# Patient Record
Sex: FEMALE | Race: WHITE | NOT HISPANIC OR LATINO | Employment: FULL TIME | URBAN - METROPOLITAN AREA
[De-identification: names, ages, dates, MRNs, and addresses within clinical notes are randomized per-mention and may not be internally consistent; named-entity substitution may affect disease eponyms.]

---

## 2019-08-22 ENCOUNTER — HOSPITAL ENCOUNTER (OUTPATIENT)
Dept: OTHER | Facility: HOSPITAL | Age: 24
Discharge: HOME | End: 2019-08-22
Attending: PHYSICIAN ASSISTANT

## 2020-08-12 ENCOUNTER — HOSPITAL ENCOUNTER (OUTPATIENT)
Dept: OTHER | Facility: HOSPITAL | Age: 25
Discharge: HOME | End: 2020-08-12
Attending: PHYSICIAN ASSISTANT

## 2020-08-12 ENCOUNTER — HOSPITAL ENCOUNTER (OUTPATIENT)
Dept: URGENT CARE | Facility: CLINIC | Age: 25
Discharge: HOME | End: 2020-08-12

## 2020-10-16 ENCOUNTER — HOSPITAL ENCOUNTER (OUTPATIENT)
Dept: URGENT CARE | Facility: CLINIC | Age: 25
Discharge: HOME | End: 2020-10-16
Attending: PHYSICIAN ASSISTANT

## 2020-12-21 ENCOUNTER — HOSPITAL ENCOUNTER (OUTPATIENT)
Dept: URGENT CARE | Facility: CLINIC | Age: 25
Discharge: HOME | End: 2020-12-21

## 2021-03-18 ENCOUNTER — HOSPITAL ENCOUNTER (OUTPATIENT)
Dept: URGENT CARE | Facility: CLINIC | Age: 26
Discharge: HOME | End: 2021-03-18
Attending: FAMILY MEDICINE

## 2021-03-22 ENCOUNTER — CONVERSION ENCOUNTER (OUTPATIENT)
Dept: PODIATRY | Facility: CLINIC | Age: 26
End: 2021-03-22

## 2021-03-22 ENCOUNTER — OFFICE VISIT CONVERTED (OUTPATIENT)
Dept: PODIATRY | Facility: CLINIC | Age: 26
End: 2021-03-22
Attending: PODIATRIST

## 2021-04-05 ENCOUNTER — OFFICE VISIT CONVERTED (OUTPATIENT)
Dept: PODIATRY | Facility: CLINIC | Age: 26
End: 2021-04-05
Attending: PODIATRIST

## 2021-05-03 ENCOUNTER — OFFICE VISIT CONVERTED (OUTPATIENT)
Dept: PODIATRY | Facility: CLINIC | Age: 26
End: 2021-05-03
Attending: PODIATRIST

## 2021-05-10 NOTE — H&P
History and Physical      Patient Name: Jessie Schultz   Patient ID: [id number]   Sex: Female   YOB: 1995    Primary Care Provider: Ga Richter MD   Referring Provider: Ga Richter MD    Visit Date: March 22, 2021    Provider: Alphonse Waite DPM   Location: [facility]   Location Address: [address] [place] [address]  [id number]   Location Phone: [phone]          Chief Complaint  · Left Foot Pain      History Of Present Illness  Jessie Schultz is a 25 year old /White female who presents to the [facility] today new patient referred from Ga Richter MD.      New, Established, New Problem:  new  Location:  Left anterior ankle  Duration:  12 March 2021  Onset:  acute, fell off porch at home  Nature:  sore, achy  Stable, worsening, improving:  stable, modest improvement    Aggravating factors:   Patient relates pain is aggravated by shoe gear and ambulation.     Previous Treatment:  Urgent Care visit, x-ray, CAM walker, crutches.    Patient denies any fevers, chills, nausea, vomiting, shortness of breathe, nor any other constitutional signs nor symptoms.    Patient states that they work at a [employer].         Past Medical History  Ankle fracture, left; Arthritis; Back pain; Bilateral Carpal Tunnel Syndrome; Chronic pain of right ankle; ETD (eustachian tube dysfunction); Numbness in feet; Pain: Wrist; Subcutaneous nodule         Past Surgical History  Adenoidectomy; Carpal tunnel release; Ear Tubes; Tonsilectomy         Medication List  metformin 500 mg oral tablet; Microgestin 1/20 (21) 1-20 mg-mcg oral tablet; Mucinex 600 mg oral tablet extended release 12hr; Nasacort 55 mcg nasal aerosol,spray; Zyrtec 10 mg oral tablet         Allergy List  SULFA (SULFONAMIDES); Sulfamethoprim DS; sulfamethoxazole; sulfamethoxazole-trimethoprim; sulfasalazine; SULFONYLUREAS         Family Medical History  Heart Disease; Cancer, Unspecified; Diabetes,  "unspecified type; Hypertension; FH: colon cancer; FH: stroke         Social History  Alcohol (Current some day); Exercises regularly; Tobacco (Never)         Review of Systems  · Constitutional  o Denies  o : fatigue, night sweats  · Eyes  o Denies  o : double vision, blurred vision  · HENT  o Denies  o : vertigo, recent head injury  · Cardiovascular  o Denies  o : chest pain, irregular heart beats  · Respiratory  o Denies  o : shortness of breath, productive cough  · Gastrointestinal  o Denies  o : nausea, vomiting  · Genitourinary  o Denies  o : dysuria, urinary retention  · Integument  o Denies  o : hair growth change, new skin lesions  · Neurologic  o Denies  o : altered mental status, seizures  · Musculoskeletal  o * See HPI  · Endocrine  o Denies  o : cold intolerance, heat intolerance  · Heme-Lymph  o Denies  o : petechiae, lymph node enlargement or tenderness  · Allergic-Immunologic  o Denies  o : frequent illnesses      Vitals  Date Time BP Position Site L\R Cuff Size HR RR TEMP (F) WT  HT  BMI kg/m2 BSA m2 O2 Sat FR L/min FiO2 HC       03/22/2021 08:05 /100 Sitting    120 - R  96.9 306lbs 0oz 5'  7\" 47.93 2.56 99 %      03/22/2021 08:05 /77 Sitting                       Physical Examination  · Constitutional  o Appearance  o : overweight, well developed  · Cardiovascular  o Peripheral Vascular System  o :   § Pedal Pulses  § : pedal pulses are 2+ and symmetrical  § Extremities  § : no edema of the lower extremities  · Musculoskeletal  o Extremeties/Joint  o : Muscle Testing: Lower extremity muscle strength and range of motion is equal and symmetrical bilaterally.   · Skin and Subcutaneous Tissue  o General Inspection  o : normal texture and turgor, with no excrenscense noted.   o Digits and Nails  o : toenails are noted to be without disease.  · Neurologic  o Sensation  o : epicritic sensations intact bilaterally  · Right Ankle/Foot  o Inspection  o : The ankle alignment and range of motion are " noted to be normal on the Right side.   · Left Ankle/Foot  o Inspection  o : The ankle alignment and range of motion are noted to be normal on the left side. Positive tenderness to palpation to ATF area. Negative anterior drawer sign. No signs of edema, erythema, lymphangitis, nor signs of infection. Local eccymosis     Dr. Waite reviewed radiographs and results from [facility] and discussed them with the patient.  These are significant for avulsion fracture in dorsal talus.  Otherwise, no periosteal reactions nor osteolytic changes seen.  No occult fractures seen.           Assessment  · Foot pain, left     729.5/M79.672  · Ankle sprain     845.00/S93.409A      Plan  · Orders  o Zinc paste impregnated bandage, non-elastic, knitted/woven, width greater than or equal to three inches and less than five inches, per yard () - - 03/22/2021  · Medications  o diclofenac sodium 75 mg oral tablet,delayed release (DR/EC)   SIG: take 1 tablet (75 mg) by oral route 2 times per day for 30 days   DISP: (60) Tablet with 1 refills  Prescribed on 03/22/2021     o Medications have been Reconciled  o Transition of Care or Provider Policy  · Instructions  o Follow Up in 2 weeks.  o Discuss Findings: I have discussed the findings of this evaluation with the patient. The discussion included a complete verbal explanation of any changes in the examination results, diagnosis, and the current treatment plan. A schedule for future care needs was explained. If any questions should arise after returning home, I have encouraged the patient to feel free to contact Dr. Waite. The patient states understanding and agreement with this plan.  o Patient instructed to keep Unna boot cast dry and clean at all times. Patient to follow up immediately if any evidence of cast irritation, or if cast gets wet to change it. Patient to ice, elevate, and rest affected leg to minimize pain andedema, and to maximize healing. Patient to remove  Unna boot in one week. Pt states understanding and agreement with instructions.  o Pt to use CAM walker when ambulating. Dr. Waite advised patient may resume driving, but advised not to drive while wearing anambulatory device. Advised quick/hard depression of brakes could cause injury to areas. Also advised of possible legal implications while driving in restrictive device. The patient states understanding and agreement with these instructions.  o Electronically Identified Patient Education Materials Provided Electronically  · Disposition  o Call or Return if symptoms worsen or persist.  · Referrals  o ID: [id number] Date: 03/19/2021 Type: Inbound  Specialty: Podiatry            Electronically Signed by: Alphonse Waite DPM -Author on April 5, 2021 08:34:11 AM

## 2021-05-14 VITALS
BODY MASS INDEX: 45.99 KG/M2 | OXYGEN SATURATION: 100 % | DIASTOLIC BLOOD PRESSURE: 80 MMHG | TEMPERATURE: 97.3 F | SYSTOLIC BLOOD PRESSURE: 126 MMHG | WEIGHT: 293 LBS | HEIGHT: 67 IN | HEART RATE: 90 BPM

## 2021-05-14 VITALS
WEIGHT: 293 LBS | BODY MASS INDEX: 45.99 KG/M2 | DIASTOLIC BLOOD PRESSURE: 90 MMHG | OXYGEN SATURATION: 98 % | HEART RATE: 110 BPM | TEMPERATURE: 97.1 F | HEIGHT: 67 IN | SYSTOLIC BLOOD PRESSURE: 123 MMHG

## 2021-05-14 VITALS
HEIGHT: 67 IN | DIASTOLIC BLOOD PRESSURE: 100 MMHG | TEMPERATURE: 96.9 F | WEIGHT: 293 LBS | BODY MASS INDEX: 45.99 KG/M2 | SYSTOLIC BLOOD PRESSURE: 153 MMHG | OXYGEN SATURATION: 99 % | HEART RATE: 120 BPM

## 2021-05-14 NOTE — PROGRESS NOTES
Progress Note      Patient Name: Jessie Schultz   Patient ID: [id number]   Sex: Female   YOB: 1995    Primary Care Provider: Ga Richter MD   Referring Provider: Ga Richter MD    Visit Date: April 5, 2021    Provider: Alphonse Waite DPM   Location: [facility]   Location Address: [address] [place] [address]  [id number]   Location Phone: [phone]          Chief Complaint  · Left Foot Pain      History Of Present Illness  Jessie Schultz is a 25 year old /White female who presents to the [facility] today a follow up for:      New, Established, New Problem:  est  Location:  Left anterior ankle  Duration:  12 March 2021  Onset:  acute, fell off porch at home  Nature:  sore, achy  Stable, worsening, improving:  some improvement    Aggravating factors:   Patient relates pain is aggravated by shoe gear and ambulation.     Previous Treatment:  Urgent Care visit, x-ray, CAM walker, crutches.    Patient denies any fevers, chills, nausea, vomiting, shortness of breathe, nor any other constitutional signs nor symptoms.    Patient states that they work at a [place].         Past Medical History  Ankle fracture, left; Arthritis; Back pain; Bilateral Carpal Tunnel Syndrome; Chronic pain of right ankle; ETD (eustachian tube dysfunction); Numbness in feet; Pain: Wrist; Subcutaneous nodule         Past Surgical History  Adenoidectomy; Carpal tunnel release; Ear Tubes; Tonsilectomy         Medication List  diclofenac sodium 75 mg oral tablet,delayed release (DR/EC); metformin 500 mg oral tablet; Microgestin 1/20 (21) 1-20 mg-mcg oral tablet; Mucinex 600 mg oral tablet extended release 12hr; Nasacort 55 mcg nasal aerosol,spray; Zyrtec 10 mg oral tablet         Allergy List  SULFA (SULFONAMIDES); Sulfamethoprim DS; sulfamethoxazole; sulfamethoxazole-trimethoprim; sulfasalazine; SULFONYLUREAS       Allergies Reconciled  Family Medical History  Heart Disease;  "Cancer, Unspecified; Diabetes, unspecified type; Hypertension; FH: colon cancer; FH: stroke         Social History  Alcohol (Current some day); Exercises regularly; Tobacco (Never)         Review of Systems  · Constitutional  o Denies  o : fatigue, night sweats  · Eyes  o Denies  o : double vision, blurred vision  · HENT  o Denies  o : vertigo, recent head injury  · Cardiovascular  o Denies  o : chest pain, irregular heart beats  · Respiratory  o Denies  o : shortness of breath, productive cough  · Gastrointestinal  o Denies  o : nausea, vomiting  · Genitourinary  o Denies  o : dysuria, urinary retention  · Integument  o Denies  o : hair growth change, new skin lesions  · Neurologic  o Denies  o : altered mental status, seizures  · Musculoskeletal  o * See HPI  · Endocrine  o Denies  o : cold intolerance, heat intolerance  · Heme-Lymph  o Denies  o : petechiae, lymph node enlargement or tenderness  · Allergic-Immunologic  o Denies  o : frequent illnesses      Vitals  Date Time BP Position Site L\R Cuff Size HR RR TEMP (F) WT  HT  BMI kg/m2 BSA m2 O2 Sat FR L/min FiO2 HC       04/05/2021 08:17 /90 Sitting    110 - R  97.1 349lbs 0oz 5'  7\" 54.66 2.74 98 %            Physical Examination  · Constitutional  o Appearance  o : overweight, well developed  · Cardiovascular  o Peripheral Vascular System  o :   § Pedal Pulses  § : pedal pulses are 2+ and symmetrical  § Extremities  § : no edema of the lower extremities  · Musculoskeletal  o Extremeties/Joint  o : Muscle Testing: Lower extremity muscle strength and range of motion is equal and symmetrical bilaterally.   · Skin and Subcutaneous Tissue  o General Inspection  o : normal texture and turgor, with no excrenscense noted.   o Digits and Nails  o : toenails are noted to be without disease.  · Neurologic  o Sensation  o : epicritic sensations intact bilaterally  · Right Ankle/Foot  o Inspection  o : The ankle alignment and range of motion are noted to be normal " on the right side.   · Left Ankle/Foot  o Inspection  o : The ankle alignment and range of motion are noted to be normal on the left side. Positive tenderness to palpation to ATF area. Negative anterior drawer sign. No signs of edema, erythema, lymphangitis, nor signs of infection. Local eccymosis     Pt using one crutch and her CAM walker on Left foot.           Assessment  · Grade 1 ankle sprain, left, initial encounter     845.00/S93.402A  · Foot pain, left     729.5/M79.672  · Ankle sprain     845.00/S93.409A      Plan  · Orders  o PHYSICAL THERAPY CONSULTATION (PHYTH) - - 04/05/2021  · Medications  o Medications have been Reconciled  o Transition of Care or Provider Policy  · Instructions  o Discuss Findings: I have discussed the findings of this evaluation with the patient. The discussion included a complete verbal explanation of any changes in the examination results, diagnosis, and the current treatment plan. A schedule for future care needs was explained. If any questions should arise after returning home, I have encouraged the patient to feel free to contact Dr. Waite. The patient states understanding and agreement with this plan.  o Monitor For Problems: Pt to monitor for problems and to contact Dr. Wiate for follow-up should such signs occur. Patient states understanding and agreement with this plan.  o Follow Up in 4 weeks.  o Discuss Findings: I have discussed the findings of this evaluation with the patient. The discussion included a complete verbal explanation of any changes in the examination results, diagnosis, and the current treatment plan. A schedule for future care needs was explained. If any questions should arise after returning home, I have encouraged the patient to feel free to contact Dr. Waite. The patient states understanding and agreement with this plan.  o Pt to use CAM walker when ambulating. Pt to d/c CAM as she improves with Physical Therapy. Dr. Waite advised patient  may resume driving, but advised not to drive while wearing an ambulatory device. Advised quick/hard depression of brakes could cause injury to areas. Also advised of possible legal implications while driving in restrictive device. The patient states understanding and agreement with these instructions.  o Electronically Identified Patient Education Materials Provided Electronically  · Disposition  o Call or Return if symptoms worsen or persist.  · Referrals  o ID: [id number] Date: 03/19/2021 Type: Inbound  Specialty: Podiatry            Electronically Signed by: Alphonse Waite DPM -Author on April 5, 2021 08:33:37 AM

## 2021-05-14 NOTE — PROGRESS NOTES
Progress Note      Patient Name: Jessie Schultz   Patient ID: [id number]   Sex: Female   YOB: 1995    Primary Care Provider: Ga Richter MD   Referring Provider: Ga Richter MD    Visit Date: May 3, 2021    Provider: Alphonse Waite DPM   Location: [facility]   Location Address: [address] [place] [address]  [id number]   Location Phone: [phone]          Chief Complaint  · Left Foot Pain      History Of Present Illness  Jessie Schultz is a 25 year old /White female who presents to the [facility] today a follow up for:      New, Established, New Problem:  est  Location:  Left anterior ankle  Duration:  12 March 2021  Onset:  acute, fell off porch at home  Nature:  sore, achy  Stable, worsening, improving:  improving  Aggravating factors:   Patient relates pain is aggravated by shoe gear and ambulation.     Previous Treatment:  Urgent Care visit, x-ray, CAM walker, crutches, Physical Therapy    Patient denies any fevers, chills, nausea, vomiting, shortness of breathe, nor any other constitutional signs nor symptoms.    Patient states that they work at a [place].    Pt states her ankle is feeling better.       Past Medical History  Ankle fracture, left; Arthritis; Back pain; Bilateral Carpal Tunnel Syndrome; Chronic pain of right ankle; ETD (eustachian tube dysfunction); Numbness in feet; Pain: Wrist; Subcutaneous nodule         Past Surgical History  Adenoidectomy; Carpal tunnel release; Ear Tubes; Tonsilectomy         Medication List  diclofenac sodium 75 mg oral tablet,delayed release (DR/EC); metformin 500 mg oral tablet; Microgestin 1/20 (21) 1-20 mg-mcg oral tablet; Mucinex 600 mg oral tablet extended release 12hr; Nasacort 55 mcg nasal aerosol,spray; Zyrtec 10 mg oral tablet         Allergy List  SULFA (SULFONAMIDES); Sulfamethoprim DS; sulfamethoxazole; sulfamethoxazole-trimethoprim; sulfasalazine; SULFONYLUREAS       Allergies  "Reconciled  Family Medical History  Heart Disease; Cancer, Unspecified; Diabetes, unspecified type; Hypertension; FH: colon cancer; FH: stroke         Social History  Alcohol (Current some day); Exercises regularly; Tobacco (Never)         Review of Systems  · Constitutional  o Denies  o : fatigue, night sweats  · Eyes  o Denies  o : double vision, blurred vision  · HENT  o Denies  o : vertigo, recent head injury  · Cardiovascular  o Denies  o : chest pain, irregular heart beats  · Respiratory  o Denies  o : shortness of breath, productive cough  · Gastrointestinal  o Denies  o : nausea, vomiting  · Genitourinary  o Denies  o : dysuria, urinary retention  · Integument  o Denies  o : hair growth change, new skin lesions  · Neurologic  o Denies  o : altered mental status, seizures  · Musculoskeletal  o * See HPI  · Endocrine  o Denies  o : cold intolerance, heat intolerance  · Heme-Lymph  o Denies  o : petechiae, lymph node enlargement or tenderness  · Allergic-Immunologic  o Denies  o : frequent illnesses      Vitals  Date Time BP Position Site L\R Cuff Size HR RR TEMP (F) WT  HT  BMI kg/m2 BSA m2 O2 Sat FR L/min FiO2 HC       05/03/2021 08:33 /80 Sitting    90 - R  97.3 347lbs 0oz 5'  7\" 54.35 2.73 100 %            Physical Examination  · Constitutional  o Appearance  o : overweight, well developed  · Cardiovascular  o Peripheral Vascular System  o :   § Pedal Pulses  § : pedal pulses are 2+ and symmetrical  § Extremities  § : no edema of the lower extremities  · Musculoskeletal  o Extremeties/Joint  o : Muscle Testing: Lower extremity muscle strength and range of motion is equal and symmetrical bilaterally.   · Skin and Subcutaneous Tissue  o General Inspection  o : normal texture and turgor, with no excrenscense noted.   o Digits and Nails  o : toenails are noted to be without disease.  · Neurologic  o Sensation  o : epicritic sensations intact bilaterally  · Right Ankle/Foot  o Inspection  o : The ankle " alignment and range of motion are noted to be normal on the right side.   · Left Ankle/Foot  o Inspection  o : The ankle alignment and range of motion are noted to be normal on the left side. Minimal tenderness to palpation to ATF area. Negative anterior drawer sign. No signs of edema, erythema, lymphangitis, nor signs of infection. No eccymosis     Pt using her CAM walker on Left foot.           Assessment  · Grade 1 ankle sprain, left, initial encounter     845.00/S93.402A  · Foot pain, left     729.5/M79.672  · Ankle sprain     845.00/S93.409A      Plan  · Medications  o Medications have been Reconciled  o Transition of Care or Provider Policy  · Instructions  o Discuss Findings: I have discussed the findings of this evaluation with the patient. The discussion included a complete verbal explanation of any changes in the examination results, diagnosis, and the current treatment plan. A schedule for future care needs was explained. If any questions should arise after returning home, I have encouraged the patient to feel free to contact Dr. Waite. The patient states understanding and agreement with this plan.  o Monitor For Problems: Pt to monitor for problems and to contact Dr. Waite for follow-up should such signs occur. Patient states understanding and agreement with this plan.  o Follow Up PRN.  o Discuss Findings: I have discussed the findings of this evaluation with the patient. The discussion included a complete verbal explanation of any changes in the examination results, diagnosis, and the current treatment plan. A schedule for future care needs was explained. If any questions should arise after returning home, I have encouraged the patient to feel free to contact Dr. Waite. The patient states understanding and agreement with this plan.  o Pt to transition to regular shoegear as tolerated. Pt to use OTC ankle brace as needed. The patient states understanding and agreement with this plan.    o Electronically Identified Patient Education Materials Provided Electronically  · Disposition  o Call or Return if symptoms worsen or persist.  · Referrals  o ID: [id number] Date: 03/19/2021 Type: Inbound  Specialty: Podiatry            Electronically Signed by: Alphonse Waite DPM -Author on May 3, 2021 08:57:19 AM

## 2021-08-17 ENCOUNTER — APPOINTMENT (OUTPATIENT)
Dept: CT IMAGING | Facility: HOSPITAL | Age: 26
End: 2021-08-17

## 2021-08-17 ENCOUNTER — HOSPITAL ENCOUNTER (EMERGENCY)
Facility: HOSPITAL | Age: 26
Discharge: HOME | End: 2021-08-17
Attending: EMERGENCY MEDICINE | Admitting: EMERGENCY MEDICINE

## 2021-08-17 ENCOUNTER — APPOINTMENT (OUTPATIENT)
Dept: GENERAL RADIOLOGY | Facility: HOSPITAL | Age: 26
End: 2021-08-17

## 2021-08-17 VITALS
RESPIRATION RATE: 16 BRPM | BODY MASS INDEX: 45.99 KG/M2 | SYSTOLIC BLOOD PRESSURE: 123 MMHG | OXYGEN SATURATION: 98 % | TEMPERATURE: 98.9 F | HEART RATE: 87 BPM | DIASTOLIC BLOOD PRESSURE: 87 MMHG | WEIGHT: 293 LBS | HEIGHT: 67 IN

## 2021-08-17 DIAGNOSIS — G51.0 LEFT-SIDED BELL'S PALSY: Primary | ICD-10-CM

## 2021-08-17 LAB
ALBUMIN SERPL-MCNC: 4.4 G/DL (ref 3.5–5.2)
ALBUMIN/GLOB SERPL: 1.4 G/DL
ALP SERPL-CCNC: 89 U/L (ref 39–117)
ALT SERPL W P-5'-P-CCNC: 22 U/L (ref 1–33)
ANION GAP SERPL CALCULATED.3IONS-SCNC: 10.7 MMOL/L (ref 5–15)
APTT PPP: 25.2 SECONDS (ref 22.2–34.2)
AST SERPL-CCNC: 17 U/L (ref 1–32)
BASOPHILS # BLD AUTO: 0.03 10*3/MM3 (ref 0–0.2)
BASOPHILS NFR BLD AUTO: 0.3 % (ref 0–1.5)
BILIRUB SERPL-MCNC: 0.2 MG/DL (ref 0–1.2)
BILIRUB UR QL STRIP: NEGATIVE
BUN SERPL-MCNC: 11 MG/DL (ref 6–20)
BUN/CREAT SERPL: 20.8 (ref 7–25)
CALCIUM SPEC-SCNC: 9.6 MG/DL (ref 8.6–10.5)
CHLORIDE SERPL-SCNC: 105 MMOL/L (ref 98–107)
CLARITY UR: CLEAR
CO2 SERPL-SCNC: 25.3 MMOL/L (ref 22–29)
COLOR UR: YELLOW
CREAT SERPL-MCNC: 0.53 MG/DL (ref 0.57–1)
DEPRECATED RDW RBC AUTO: 38.5 FL (ref 37–54)
EOSINOPHIL # BLD AUTO: 0.24 10*3/MM3 (ref 0–0.4)
EOSINOPHIL NFR BLD AUTO: 2.4 % (ref 0.3–6.2)
ERYTHROCYTE [DISTWIDTH] IN BLOOD BY AUTOMATED COUNT: 12.5 % (ref 12.3–15.4)
GFR SERPL CREATININE-BSD FRML MDRD: 139 ML/MIN/1.73
GLOBULIN UR ELPH-MCNC: 3.1 GM/DL
GLUCOSE SERPL-MCNC: 97 MG/DL (ref 65–99)
GLUCOSE UR STRIP-MCNC: NEGATIVE MG/DL
HCT VFR BLD AUTO: 41.4 % (ref 34–46.6)
HGB BLD-MCNC: 13.8 G/DL (ref 12–15.9)
HGB UR QL STRIP.AUTO: NEGATIVE
HOLD SPECIMEN: NORMAL
HOLD SPECIMEN: NORMAL
IMM GRANULOCYTES # BLD AUTO: 0.07 10*3/MM3 (ref 0–0.05)
IMM GRANULOCYTES NFR BLD AUTO: 0.7 % (ref 0–0.5)
INR PPP: 0.92 (ref 2–3)
KETONES UR QL STRIP: NEGATIVE
LEUKOCYTE ESTERASE UR QL STRIP.AUTO: NEGATIVE
LYMPHOCYTES # BLD AUTO: 2.7 10*3/MM3 (ref 0.7–3.1)
LYMPHOCYTES NFR BLD AUTO: 27.2 % (ref 19.6–45.3)
MCH RBC QN AUTO: 28.6 PG (ref 26.6–33)
MCHC RBC AUTO-ENTMCNC: 33.3 G/DL (ref 31.5–35.7)
MCV RBC AUTO: 85.7 FL (ref 79–97)
MONOCYTES # BLD AUTO: 0.55 10*3/MM3 (ref 0.1–0.9)
MONOCYTES NFR BLD AUTO: 5.5 % (ref 5–12)
NEUTROPHILS NFR BLD AUTO: 6.32 10*3/MM3 (ref 1.7–7)
NEUTROPHILS NFR BLD AUTO: 63.9 % (ref 42.7–76)
NITRITE UR QL STRIP: NEGATIVE
NRBC BLD AUTO-RTO: 0 /100 WBC (ref 0–0.2)
PH UR STRIP.AUTO: 5.5 [PH] (ref 5–8)
PLATELET # BLD AUTO: 307 10*3/MM3 (ref 140–450)
PMV BLD AUTO: 9.3 FL (ref 6–12)
POTASSIUM SERPL-SCNC: 3.9 MMOL/L (ref 3.5–5.2)
PROT SERPL-MCNC: 7.5 G/DL (ref 6–8.5)
PROT UR QL STRIP: NEGATIVE
PROTHROMBIN TIME: 10.2 SECONDS (ref 9.4–12)
QT INTERVAL: 365 MS
RBC # BLD AUTO: 4.83 10*6/MM3 (ref 3.77–5.28)
SODIUM SERPL-SCNC: 141 MMOL/L (ref 136–145)
SP GR UR STRIP: 1.02 (ref 1–1.03)
TROPONIN T SERPL-MCNC: <0.01 NG/ML (ref 0–0.03)
UROBILINOGEN UR QL STRIP: NORMAL
WBC # BLD AUTO: 9.91 10*3/MM3 (ref 3.4–10.8)
WHOLE BLOOD HOLD SPECIMEN: NORMAL

## 2021-08-17 PROCEDURE — 70450 CT HEAD/BRAIN W/O DYE: CPT

## 2021-08-17 PROCEDURE — 93005 ELECTROCARDIOGRAM TRACING: CPT | Performed by: EMERGENCY MEDICINE

## 2021-08-17 PROCEDURE — 85025 COMPLETE CBC W/AUTO DIFF WBC: CPT | Performed by: EMERGENCY MEDICINE

## 2021-08-17 PROCEDURE — 85730 THROMBOPLASTIN TIME PARTIAL: CPT | Performed by: EMERGENCY MEDICINE

## 2021-08-17 PROCEDURE — 93010 ELECTROCARDIOGRAM REPORT: CPT | Performed by: INTERNAL MEDICINE

## 2021-08-17 PROCEDURE — 80053 COMPREHEN METABOLIC PANEL: CPT | Performed by: EMERGENCY MEDICINE

## 2021-08-17 PROCEDURE — 81003 URINALYSIS AUTO W/O SCOPE: CPT | Performed by: EMERGENCY MEDICINE

## 2021-08-17 PROCEDURE — 71045 X-RAY EXAM CHEST 1 VIEW: CPT | Performed by: RADIOLOGY

## 2021-08-17 PROCEDURE — 84484 ASSAY OF TROPONIN QUANT: CPT | Performed by: EMERGENCY MEDICINE

## 2021-08-17 PROCEDURE — 99284 EMERGENCY DEPT VISIT MOD MDM: CPT

## 2021-08-17 PROCEDURE — 71045 X-RAY EXAM CHEST 1 VIEW: CPT

## 2021-08-17 PROCEDURE — 85610 PROTHROMBIN TIME: CPT | Performed by: EMERGENCY MEDICINE

## 2021-08-17 RX ORDER — METHYLPREDNISOLONE 4 MG/1
TABLET ORAL
Qty: 21 TABLET | Refills: 0 | Status: SHIPPED | OUTPATIENT
Start: 2021-08-17 | End: 2021-10-31

## 2021-08-17 RX ORDER — VALACYCLOVIR HYDROCHLORIDE 1 G/1
1000 TABLET, FILM COATED ORAL 3 TIMES DAILY
Qty: 21 TABLET | Refills: 0 | Status: SHIPPED | OUTPATIENT
Start: 2021-08-17 | End: 2021-10-31

## 2021-08-17 RX ORDER — LORATADINE 10 MG/1
10 TABLET ORAL DAILY
COMMUNITY
End: 2022-08-24

## 2021-08-17 RX ORDER — SODIUM CHLORIDE 0.9 % (FLUSH) 0.9 %
10 SYRINGE (ML) INJECTION AS NEEDED
Status: DISCONTINUED | OUTPATIENT
Start: 2021-08-17 | End: 2021-08-17 | Stop reason: HOSPADM

## 2021-08-17 NOTE — ED PROVIDER NOTES
"Subjective   Jessie Schultz is a 26 y.o. female who presents to the emergency department today with complaints of left sided facial numbness since this morning. Pt states \"when I try talking the right side of my face moves but the left stays the same. Its the same when I try to raise my eyebrows.\" Additionally, she claims she has lost all taste since last night. Pt follows with Dr. Rober MD, for her primary care needs. She denies confusion, slurred speech, visual changes, arm or leg numbness, weakness, chills, diaphoresis, fever, or any other pertinent sx or concerns.           Review of Systems   Constitutional: Negative for chills and fever.   HENT: Negative for nosebleeds.    Eyes: Negative for redness.   Respiratory: Negative for cough and shortness of breath.    Cardiovascular: Negative for chest pain.   Gastrointestinal: Negative for diarrhea and vomiting.   Genitourinary: Negative for dysuria and frequency.   Musculoskeletal: Negative for back pain and neck pain.   Skin: Negative for rash.   Neurological: Positive for facial asymmetry and numbness. Negative for seizures and syncope.       History reviewed. No pertinent past medical history.    Allergies   Allergen Reactions   • Sulfa Antibiotics Hives   • Sulfamethoxazole Hives       Past Surgical History:   Procedure Laterality Date   • ADENOIDECTOMY     • CARPAL TUNNEL RELEASE         Family History   Problem Relation Age of Onset   • Diabetes Mother    • Diabetes Father    • Diabetes Maternal Grandmother    • Diabetes Paternal Grandfather        Social History     Socioeconomic History   • Marital status: Single     Spouse name: Not on file   • Number of children: Not on file   • Years of education: Not on file   • Highest education level: Not on file   Tobacco Use   • Smoking status: Never Smoker   • Smokeless tobacco: Never Used   Substance and Sexual Activity   • Alcohol use: Never   • Drug use: Never   • Sexual activity: Defer         Objective "   Physical Exam  Vitals and nursing note reviewed.   Constitutional:       General: She is not in acute distress.  HENT:      Head: Normocephalic and atraumatic.      Nose: Nose normal.      Mouth/Throat:      Mouth: Mucous membranes are moist.   Eyes:      General: No scleral icterus.  Cardiovascular:      Rate and Rhythm: Normal rate and regular rhythm.      Heart sounds: Normal heart sounds. No murmur heard.     Pulmonary:      Effort: No respiratory distress.      Breath sounds: Normal breath sounds.   Abdominal:      Palpations: Abdomen is soft.      Tenderness: There is no abdominal tenderness.   Musculoskeletal:         General: No tenderness. Normal range of motion.      Cervical back: Normal range of motion and neck supple. No tenderness.      Right lower leg: No edema.      Left lower leg: No edema.   Skin:     General: Skin is warm and dry.   Neurological:      General: No focal deficit present.      Mental Status: She is alert.      Sensory: No sensory deficit.      Motor: No weakness.      Comments: Left sided facial paralysis.  Partial blink of left eye   Unable to raise left side of her face    Psychiatric:         Behavior: Behavior normal.         Procedures         ED Course     Labs Reviewed   COMPREHENSIVE METABOLIC PANEL - Abnormal; Notable for the following components:       Result Value    Creatinine 0.53 (*)     All other components within normal limits    Narrative:     GFR Normal >60  Chronic Kidney Disease <60  Kidney Failure <15     PROTIME-INR - Abnormal; Notable for the following components:    INR 0.92 (*)     All other components within normal limits    Narrative:     Suggested Therapeutic Ranges For Oral Anticoagulant Therapy:  Level of Therapy                      INR Target Range  Standard Dose                            2.0-3.0  High Dose                                2.5-3.5  Patients not receiving anticoagulant  Therapy Normal Range                     0.6-1.2   CBC WITH AUTO  DIFFERENTIAL - Abnormal; Notable for the following components:    Immature Grans % 0.7 (*)     Immature Grans, Absolute 0.07 (*)     All other components within normal limits   APTT - Normal   TROPONIN (IN-HOUSE) - Normal    Narrative:     Troponin T Reference Range:  <= 0.03 ng/mL-   Negative for AMI  >0.03 ng/mL-     Abnormal for myocardial necrosis.  Clinicians would have to utilize clinical acumen, EKG, Troponin and serial changes to determine if it is an Acute Myocardial Infarction or myocardial injury due to an underlying chronic condition.       Results may be falsely decreased if patient taking Biotin.     URINALYSIS W/ MICROSCOPIC IF INDICATED (NO CULTURE) - Normal    Narrative:     Urine microscopic not indicated.   RAINBOW DRAW    Narrative:     The following orders were created for panel order [place] Draw.  Procedure                               Abnormality         Status                     ---------                               -----------         ------                     Green Top (Gel)[[id number]]                                  Final result               Lavender Top[[id number]]                                     Final result               Gold Top - SST[[id number]]                                   Final result                 Please view results for these tests on the individual orders.   POCT GLUCOSE FINGERSTICK   POCT CREATININE   CBC AND DIFFERENTIAL    Narrative:     The following orders were created for panel order CBC & Differential.  Procedure                               Abnormality         Status                     ---------                               -----------         ------                     CBC Auto Differential[[id number]]        Abnormal            Final result                 Please view results for these tests on the individual orders.   GREEN TOP   LAVENDER TOP   GOLD TOP - SST   POCT CREATININE WITH HOLD TUBE    Narrative:     The following orders were created for  panel order POC Creatinine with Hold Tube.  Procedure                               Abnormality         Status                     ---------                               -----------         ------                     POC Creatinine[[id number]]                                                              Hold Creatinine Tube[[id number]]                                                          Please view results for these tests on the individual orders.   HOLD ISTAT CREATININE TUBE     XR Chest 1 View    Result Date: 8/17/2021  PROCEDURE: XR CHEST 1 VW  COMPARISON: [facility], , CHEST AP/PA 1 VIEW, 2/12/2016, 12:53.  INDICATIONS: ACUTE STROKE PROTOCOL, FACIAL NUMBNESS  FINDINGS:  Exam is under penetrated.  Low lung volumes.  Normal heart and mediastinal contour.  No hilar finding.  No obvious pleural effusion.  No focal consolidation.  Density projecting over left thorax may reflect external artifact, foreign body, or calcification.  CONCLUSION:  1. No definite acute disease.  There is a 2 centimeter density projecting over aorta is that may reflect external artifact or calcified granuloma.  It was not seen on prior exam from 2016.       IAN SILVESTRE MD       Electronically Signed and Approved By: IAN SILVESTRE MD on 8/17/2021 at 11:29             CT Head Without Contrast Stroke Protocol    Result Date: 8/17/2021  PROCEDURE: CT HEAD WO CONTRAST STROKE PROTOCOL  COMPARISON:  None INDICATIONS: left facial numbness  PROTOCOL:   Standard imaging protocol performed    RADIATION:   DLP: 1018.2mGy*cm   MA and/or KV was adjusted to minimize radiation dose.     TECHNIQUE: After obtaining the patient's consent, CT images were obtained without non-ionic intravenous contrast material.  FINDINGS:  Ventricles and CSF containing spaces are normal in size and configuration no intra or extra-axial mass lesions, fluid collections, or mass effect is seen.  No focal areas of low attenuation or acute intracranial  hemorrhage are seen.  There is mild chronic mucosal thickening in the right maxillary sinus and there is mucosal disease in the ethmoid sinuses bilaterally.  The left frontal sinus is hypoplastic.  CONCLUSION:  1. Normal noncontrast CT brain 2. Chronic mucosal disease in the ethmoid and right maxillary sinuses.     Daniel Sanchez MD       Electronically Signed and Approved By: Daniel Sanchez MD on 8/17/2021 at 10:52                                            Patient was seen evaluated ED by me.  Above history and physical examination was performed as stated above.  The diagnostic data was obtained.  Results reviewed.  And discussed with the patient.  Patient symptoms are consistent with Bell's palsy.  I did discuss symptoms, course, treatment options with her.  This point time patient for discharge home with outpatient follow-up.        MDM    Final diagnoses:   Left-sided Bell's palsy       Documentation assistance provided by yo Bateman.  Information recorded by the yo was done at my direction and has been verified and validated by me.     Deana Bateman  08/17/21 1232       Anton Slater DO  08/17/21 1429

## 2021-08-17 NOTE — DISCHARGE INSTRUCTIONS
Keep the left eye moisturized.  Use eyedrops during the daytime and use moisturizing ointment at bedtime with tape your eye shut.  Follow-up with your family doctor in 1 week if symptoms or not improved discussed possible physical therapy at that time.  Take the prescriptions I prescribed for you today as directed.  Return to the ER for any numbness tingling or weakness of an arm or leg.

## 2021-11-01 ENCOUNTER — TRANSCRIBE ORDERS (OUTPATIENT)
Dept: ADMINISTRATIVE | Facility: HOSPITAL | Age: 26
End: 2021-11-01

## 2021-11-01 ENCOUNTER — HOSPITAL ENCOUNTER (OUTPATIENT)
Dept: INFUSION THERAPY | Facility: HOSPITAL | Age: 26
Discharge: HOME | End: 2021-11-01
Admitting: FAMILY MEDICINE

## 2021-11-01 VITALS
HEART RATE: 104 BPM | TEMPERATURE: 98.9 F | RESPIRATION RATE: 22 BRPM | OXYGEN SATURATION: 99 % | DIASTOLIC BLOOD PRESSURE: 71 MMHG | SYSTOLIC BLOOD PRESSURE: 136 MMHG

## 2021-11-01 DIAGNOSIS — U07.1 COVID-19: Primary | ICD-10-CM

## 2021-11-01 PROCEDURE — 25010000002 INJECTION, CASIRIVIMAB AND IMDEVIMAB, 1200 MG: Performed by: FAMILY MEDICINE

## 2021-11-01 PROCEDURE — M0243 CASIRIVI AND IMDEVI INFUSION: HCPCS | Performed by: FAMILY MEDICINE

## 2021-11-01 RX ORDER — EPINEPHRINE 1 MG/ML
0.3 INJECTION, SOLUTION, CONCENTRATE INTRAVENOUS AS NEEDED
Status: DISCONTINUED | OUTPATIENT
Start: 2021-11-01 | End: 2021-11-03 | Stop reason: HOSPADM

## 2021-11-01 RX ORDER — DIPHENHYDRAMINE HCL 50 MG
50 CAPSULE ORAL ONCE AS NEEDED
Status: DISCONTINUED | OUTPATIENT
Start: 2021-11-01 | End: 2021-11-03 | Stop reason: HOSPADM

## 2021-11-01 RX ORDER — DIPHENHYDRAMINE HYDROCHLORIDE 50 MG/ML
50 INJECTION INTRAMUSCULAR; INTRAVENOUS ONCE AS NEEDED
Status: DISCONTINUED | OUTPATIENT
Start: 2021-11-01 | End: 2021-11-03 | Stop reason: HOSPADM

## 2021-11-01 RX ADMIN — IMDEVIMAB: 1332 INJECTION, SOLUTION, CONCENTRATE INTRAVENOUS at 14:25

## 2021-11-01 NOTE — CODE DOCUMENTATION
Pt didn't tolerate injections very well. She was carefully monitored for any adverse effects from injections. Vs were stable and sites were clear. Education material was given to pt.

## 2021-12-09 ENCOUNTER — APPOINTMENT (OUTPATIENT)
Dept: GENERAL RADIOLOGY | Facility: HOSPITAL | Age: 26
End: 2021-12-09

## 2021-12-09 ENCOUNTER — HOSPITAL ENCOUNTER (EMERGENCY)
Facility: HOSPITAL | Age: 26
Discharge: HOME | End: 2021-12-09
Attending: EMERGENCY MEDICINE | Admitting: EMERGENCY MEDICINE

## 2021-12-09 VITALS
BODY MASS INDEX: 44.41 KG/M2 | OXYGEN SATURATION: 99 % | HEIGHT: 68 IN | RESPIRATION RATE: 18 BRPM | WEIGHT: 293 LBS | HEART RATE: 103 BPM | DIASTOLIC BLOOD PRESSURE: 94 MMHG | SYSTOLIC BLOOD PRESSURE: 143 MMHG | TEMPERATURE: 98.1 F

## 2021-12-09 DIAGNOSIS — S80.211A ABRASION OF RIGHT KNEE, INITIAL ENCOUNTER: ICD-10-CM

## 2021-12-09 DIAGNOSIS — S50.01XA CONTUSION OF RIGHT ELBOW, INITIAL ENCOUNTER: ICD-10-CM

## 2021-12-09 DIAGNOSIS — S50.311A ABRASION OF RIGHT ELBOW, INITIAL ENCOUNTER: ICD-10-CM
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
## 2021-12-09 DIAGNOSIS — S80.01XA CONTUSION OF RIGHT KNEE, INITIAL ENCOUNTER: Primary | ICD-10-CM

## 2021-12-09 PROCEDURE — 73562 X-RAY EXAM OF KNEE 3: CPT

## 2021-12-09 PROCEDURE — 73070 X-RAY EXAM OF ELBOW: CPT

## 2021-12-09 PROCEDURE — 99283 EMERGENCY DEPT VISIT LOW MDM: CPT

## 2021-12-09 RX ORDER — GINSENG 100 MG
CAPSULE ORAL ONCE
Status: DISCONTINUED | OUTPATIENT
Start: 2021-12-09 | End: 2021-12-09 | Stop reason: HOSPADM

## 2021-12-09 NOTE — ED PROVIDER NOTES
Subjective   Was walking into the parking lot at work last night when she misjudged the step down and fell approx 1.5 feet onto the asphalt. Landed on her right knee and elbow. Has abrasions to her right knee and elbow and continues to have pain in these areas. She is ambulatory to the room.      History provided by:  Patient   used: No        Review of Systems   Constitutional: Negative.    HENT: Negative.    Eyes: Negative.    Respiratory: Negative.    Cardiovascular: Negative.    Gastrointestinal: Negative.    Endocrine: Negative.    Genitourinary: Negative.    Musculoskeletal: Negative.    Skin: Negative.    Allergic/Immunologic: Negative.    Neurological: Negative.    Hematological: Negative.    Psychiatric/Behavioral: Negative.        History reviewed. No pertinent past medical history.    Allergies   Allergen Reactions   • Sulfa Antibiotics Hives   • Sulfamethoxazole Hives   • Sulfamethoxazole-Trimethoprim Hives   • Sulfasalazine Hives   • Sulfonylureas Hives       Past Surgical History:   Procedure Laterality Date   • ADENOIDECTOMY     • CARPAL TUNNEL RELEASE         Family History   Problem Relation Age of Onset   • Diabetes Mother    • Diabetes Father    • Diabetes Maternal Grandmother    • Diabetes Paternal Grandfather        Social History     Socioeconomic History   • Marital status: Single   Tobacco Use   • Smoking status: Never Smoker   • Smokeless tobacco: Never Used   Substance and Sexual Activity   • Alcohol use: Never   • Drug use: Never   • Sexual activity: Defer           Objective   Physical Exam  Vitals and nursing note reviewed.   Constitutional:       Appearance: Normal appearance.   HENT:      Head: Normocephalic and atraumatic.      Nose: Nose normal.      Mouth/Throat:      Mouth: Mucous membranes are moist.   Eyes:      Pupils: Pupils are equal, round, and reactive to light.   Cardiovascular:      Rate and Rhythm: Normal rate and regular rhythm.      Pulses: Normal  pulses.   Pulmonary:      Effort: Pulmonary effort is normal.      Breath sounds: Normal breath sounds.   Abdominal:      General: Abdomen is flat. Bowel sounds are normal.      Palpations: Abdomen is soft.   Musculoskeletal:        Arms:       Cervical back: Normal range of motion.        Legs:    Skin:     General: Skin is warm and dry.      Capillary Refill: Capillary refill takes less than 2 seconds.   Neurological:      General: No focal deficit present.      Mental Status: She is alert and oriented to person, place, and time. Mental status is at baseline.   Psychiatric:         Mood and Affect: Mood normal.         Procedures           ED Course                                                 MDM  Number of Diagnoses or Management Options  Abrasion of right elbow, initial encounter: minor  Abrasion of right knee, initial encounter: minor  Contusion of right elbow, initial encounter: new and requires workup  Contusion of right knee, initial encounter: new and requires workup  Fall from height of less than 3 feet: minor     Amount and/or Complexity of Data Reviewed  Tests in the radiology section of CPT®: reviewed and ordered    Risk of Complications, Morbidity, and/or Mortality  Presenting problems: low  Diagnostic procedures: low  Management options: low    Patient Progress  Patient progress: stable      Final diagnoses:   Contusion of right knee, initial encounter   Abrasion of right knee, initial encounter   Abrasion of right elbow, initial encounter   Contusion of right elbow, initial encounter   Fall from height of less than 3 feet       ED Disposition  ED Disposition     ED Disposition Condition Comment    Discharge Stable           Ga Richter MD  [address] [facility] [address]  [phone]      As needed         Medication List      No changes were made to your prescriptions during this visit.          Jeanie Loredo, APRN  12/09/21 1930

## 2021-12-09 NOTE — DISCHARGE INSTRUCTIONS
Elevate right elbow and right knee as much as possible. Clean daily with mild soap and water, pat dry, apply neosporin and a non stick bandage to abrasions. Wear ACE to right elbow and right knee for support and take OTC tylenol as needed for pain. Follow up with your PCP next week if no better. Return to the ED for any worsening or new symptoms of concern

## 2021-12-27 ENCOUNTER — APPOINTMENT (OUTPATIENT)
Dept: GENERAL RADIOLOGY | Facility: HOSPITAL | Age: 26
End: 2021-12-27

## 2021-12-27 ENCOUNTER — HOSPITAL ENCOUNTER (EMERGENCY)
Facility: HOSPITAL | Age: 26
Discharge: HOME | End: 2021-12-27
Attending: EMERGENCY MEDICINE | Admitting: EMERGENCY MEDICINE

## 2021-12-27 VITALS
WEIGHT: 293 LBS | DIASTOLIC BLOOD PRESSURE: 81 MMHG | SYSTOLIC BLOOD PRESSURE: 128 MMHG | HEART RATE: 98 BPM | OXYGEN SATURATION: 98 % | BODY MASS INDEX: 44.41 KG/M2 | TEMPERATURE: 98.2 F | RESPIRATION RATE: 20 BRPM | HEIGHT: 68 IN

## 2021-12-27 DIAGNOSIS — S86.911A KNEE STRAIN, RIGHT, INITIAL ENCOUNTER: ICD-10-CM

## 2021-12-27 DIAGNOSIS — S82.832A CLOSED AVULSION FRACTURE OF DISTAL FIBULA, LEFT, INITIAL ENCOUNTER: Primary | ICD-10-CM

## 2021-12-27 PROCEDURE — 99284 EMERGENCY DEPT VISIT MOD MDM: CPT

## 2021-12-27 PROCEDURE — 73610 X-RAY EXAM OF ANKLE: CPT

## 2021-12-27 PROCEDURE — 73562 X-RAY EXAM OF KNEE 3: CPT

## 2021-12-27 RX ORDER — HYDROCODONE BITARTRATE AND ACETAMINOPHEN 5; 325 MG/1; MG/1
1 TABLET ORAL 4 TIMES DAILY PRN
Qty: 12 TABLET | Refills: 0 | Status: SHIPPED | OUTPATIENT
Start: 2021-12-27 | End: 2022-08-24

## 2021-12-27 RX ORDER — HYDROCODONE BITARTRATE AND ACETAMINOPHEN 5; 325 MG/1; MG/1
1 TABLET ORAL EVERY 6 HOURS PRN
Status: DISCONTINUED | OUTPATIENT
Start: 2021-12-27 | End: 2021-12-28 | Stop reason: HOSPADM

## 2021-12-27 RX ORDER — IBUPROFEN 800 MG/1
800 TABLET ORAL EVERY 6 HOURS PRN
Qty: 30 TABLET | Refills: 0 | Status: SHIPPED | OUTPATIENT
Start: 2021-12-27 | End: 2022-08-24

## 2021-12-27 RX ADMIN — HYDROCODONE BITARTRATE AND ACETAMINOPHEN 1 TABLET: 5; 325 TABLET ORAL at 23:27

## 2021-12-28 ENCOUNTER — TELEPHONE (OUTPATIENT)
Dept: ORTHOPEDIC SURGERY | Facility: CLINIC | Age: 26
End: 2021-12-28

## 2021-12-28 NOTE — ED NOTES
Patient escorted to lab for workers comp drug screen by ED staff.      Ludwig Winkler, RN  12/27/21 3082

## 2021-12-28 NOTE — ED PROVIDER NOTES
Time: 07:04 EST  Arrived by: EMS  Chief Complaint: Left ankle pain  History provided by: Patient  History is limited by: N/A    History of Present Illness:  Patient is a 26 y.o. year old female that presents to the emergency department with patient rolled ankle on side of concrete causing pain and hitting the right knee      History provided by:  Patient  Ankle Injury  Location:  Left ankle  Quality:  Sore and throbbing  Severity:  Moderate  Onset quality:  Sudden  Duration: Just prior to arrival.  Timing:  Constant  Progression:  Unchanged  Chronicity:  New  Context:  Patient stepped on edge of concrete and rolled ankle and hit knee  Relieved by:  Nothing  Worsened by:  Movement or touch  Ineffective treatments:  None tried  Associated symptoms: no abdominal pain, no chest pain, no fever and no shortness of breath    Knee Pain  Location:  Knee  Injury: yes    Mechanism of injury: fall    Fall:     Fall occurred:  Walking    Entrapped after fall: no    Knee location:  R knee  Pain details:     Quality:  Aching    Radiates to:  Does not radiate    Severity:  Mild    Onset quality:  Sudden    Duration: Just prior to arrival.    Timing:  Constant    Progression:  Unchanged  Chronicity:  New  Dislocation: no    Foreign body present:  No foreign bodies  Prior injury to area:  No  Relieved by:  Nothing  Worsened by:  Extension and flexion (Touch)  Associated symptoms: decreased ROM    Associated symptoms: no back pain, no fever, no itching, no muscle weakness, no neck pain, no numbness, no stiffness, no swelling and no tingling    Risk factors: obesity    Fall  Associated symptoms: no abdominal pain, no back pain, no chest pain and no neck pain      Similar Symptoms Previously: No  Recently seen: No      Patient Care Team  Primary Care Provider: Dr. Richter    Past Medical History:     Allergies   Allergen Reactions   • Sulfa Antibiotics Hives   • Sulfamethoxazole Hives   • Sulfamethoxazole-Trimethoprim Hives   •  Sulfasalazine Hives   • Sulfonylureas Hives     No past medical history on file.  Past Surgical History:   Procedure Laterality Date   • ADENOIDECTOMY     • CARPAL TUNNEL RELEASE       Family History   Problem Relation Age of Onset   • Diabetes Mother    • Diabetes Father    • Diabetes Maternal Grandmother    • Diabetes Paternal Grandfather        Home Medications:  Prior to Admission medications    Medication Sig Start Date End Date Taking? Authorizing Provider   cetirizine (ZyrTEC Allergy) 10 MG tablet Take 1 tablet by mouth Daily. 7/2/21   Susana Yang APRN   loratadine (CLARITIN) 10 MG tablet Take 10 mg by mouth Daily.    Provider, MD Laura   metFORMIN ER (GLUCOPHAGE-XR) 750 MG 24 hr tablet Take 750 mg by mouth 2 (Two) Times a Day. 9/8/21   Emergency, Nurse Epic, RN   naproxen (NAPROSYN) 500 MG tablet Take 500 mg by mouth 2 (Two) Times a Day As Needed. 9/8/21   Emergency, Nurse Yahaira RN        Social History:   PT  reports that she has never smoked. She has never used smokeless tobacco. She reports that she does not drink alcohol and does not use drugs.    Record Review:  I have reviewed the patient's records in [facility].     Review of Systems  Review of Systems   Constitutional: Negative for fever.   Respiratory: Negative for shortness of breath.    Cardiovascular: Negative for chest pain.   Gastrointestinal: Negative for abdominal pain.   Genitourinary: Negative for flank pain and pelvic pain.   Musculoskeletal: Positive for arthralgias ( Left ankle and right knee), gait problem ( Able to bear weight due to pain) and joint swelling ( Left ankle swelling). Negative for back pain, neck pain and stiffness.   Skin: Positive for wound ( Abrasion right knee). Negative for itching.   Neurological: Negative for weakness and numbness.   Hematological: Negative.    Psychiatric/Behavioral: Negative.         Physical Exam  /81 (BP Location: Left arm, Patient Position: Sitting)   Pulse 98   Temp 98.2  "°F (36.8 °C) (Oral)   Resp 20   Ht 172.7 cm (68\")   Wt (!) 152 kg (336 lb)   LMP 10/26/2021   SpO2 98%   BMI 51.09 kg/m²     Physical Exam  Vitals and nursing note reviewed.   Constitutional:       General: She is not in acute distress.     Appearance: Normal appearance. She is not toxic-appearing.   HENT:      Head: Atraumatic.   Eyes:      General: No scleral icterus.  Cardiovascular:      Pulses: Normal pulses.   Pulmonary:      Effort: Pulmonary effort is normal. No respiratory distress.   Abdominal:      Tenderness: There is no abdominal tenderness.   Musculoskeletal:         General: Tenderness ( Left lateral and anterior ankle moderate.  Mild right knee tenderness at patella and right lateral aspect) present.      Cervical back: Normal range of motion.      Comments: Painful range of motion of right knee.    Patient refuses to do any range of motion of left ankle   Skin:     General: Skin is warm and dry.      Capillary Refill: Capillary refill takes less than 2 seconds.   Neurological:      Mental Status: She is alert and oriented to person, place, and time.      Sensory: No sensory deficit.      Gait: Abnormal gait:  Gait not tested due to pain.   Psychiatric:         Mood and Affect: Mood normal.         Behavior: Behavior normal.            ED Course  /81 (BP Location: Left arm, Patient Position: Sitting)   Pulse 98   Temp 98.2 °F (36.8 °C) (Oral)   Resp 20   Ht 172.7 cm (68\")   Wt (!) 152 kg (336 lb)   LMP 10/26/2021   SpO2 98%   BMI 51.09 kg/m²   Results for orders placed or performed during the hospital encounter of 11/10/21   POCT SARS-CoV-2 Antigen ALBERTA   ([place] [facility])    Specimen: Swab   Result Value Ref Range    SARS Antigen Detected (A) Not Detected    Internal Control Passed Passed    Lot Number [id number]     Expiration Date 2/20/23      Medications - No data to display  XR Elbow 2 View Right    Result Date: 12/9/2021  Narrative: PROCEDURE: XR ELBOW 2 VW RIGHT  " COMPARISON: None  INDICATIONS: generalized right elbow pain after fall yesterday  FINDINGS:  There is no evidence for displaced fracture or dislocation. No significant joint effusion is observed. No focal osseous abnormalities are identified. The adjacent soft tissues are unremarkable.  CONCLUSION:  1. No evidence for acute displaced fracture or dislocation. 2. No evidence for significant joint effusion.      MERCEDES CLARK MD       Electronically Signed and Approved By: MERCEDES CLARK MD on 12/09/2021 at 17:57             XR Knee 3 View Right    Result Date: 12/27/2021  Narrative: PROCEDURE: XR KNEE 3 VW RIGHT  COMPARISON: [facility]iatric, , XR KNEE 3 VW RIGHT, 12/09/2021, 17:38.  INDICATIONS: FALL TODAY, RIGHT KNEE PAIN  FINDINGS:  No acute fracture is identified.  The joint spaces appear well maintained.  A pedunculated osteochondroma is unchanged.  No suprapatellar joint effusion is identified.  CONCLUSION: No acute fracture or traumatic malalignment identified.      MERCEDES HUTCHINSON MD       Electronically Signed and Approved By: MERCEDES HUTCHINSON MD on 12/27/2021 at 21:59             XR Knee 3 View Right    Result Date: 12/9/2021  Narrative: PROCEDURE: XR KNEE 3 VW RIGHT  COMPARISON: [facility], KNEE 3 VIEWS RT, 8/12/2020, 14:28.  INDICATIONS: generalized right knee pain after fall yesterday  FINDINGS: There is no evidence for displaced fracture or dislocation. No definitive joint effusion is observed.  A pedunculated osteochondromas again noted arising from the medial aspect of the distal metaphysis of the right femur.  The soft tissues are unremarkable.  CONCLUSION:  1. No evidence for displaced fracture or dislocation. 2. A pedunculated osteochondroma is again noted.      MERCEDES CLARK MD       Electronically Signed and Approved By: MERCEDES CLARK MD on 12/09/2021 at 17:59             XR Ankle 3+ View Left    Result Date: 12/27/2021  Narrative: PROCEDURE: XR ANKLE 3+ VW LEFT   COMPARISON: [facility], ANKLE >OR= 3V LT, 8/12/2020, 14:45.  INDICATIONS: FALL TODAY, LEFT ANKLE PAIN  FINDINGS:  There appears to be a tiny avulsion fracture along the tip of the distal fibula.  The mortise is congruent.  The talar dome appears intact.  There are moderate degenerative changes along the dorsal midfoot.  The soft tissues are unremarkable.  CONCLUSION: Suggestion of tiny avulsion fracture along tip of distal fibula.  Correlate with point tenderness.      MERCEDES HUTCHINSON MD       Electronically Signed and Approved By: MERCEDES HUTCHINSON MD on 12/27/2021 at 22:02             Medical Decision Making:                     MDM  Number of Diagnoses or Management Options  Closed avulsion fracture of distal fibula, left, initial encounter  Knee strain, right, initial encounter  Diagnosis management comments: I have spoken with the patient. I have explained the patient´s condition, diagnoses and treatment plan based on the information available to me at this time. I have answered the patient's questions and addressed any concerns. The patient has a good  understanding of the patient´s diagnosis, condition, and treatment plan as can be expected at this point. The vital signs have been stable. The patient´s condition is stable and appropriate for discharge from the emergency department.      The patient will pursue further outpatient evaluation with the primary care physician or other designated or consulting physician as outlined in the discharge instructions. They are agreeable to this plan of care and follow-up instructions have been explained in detail. The patient has received these instructions in written format and have expressed an understanding of the discharge instructions. The patient is aware that any significant change in condition or worsening of symptoms should prompt an immediate return to this or the closest emergency department or call to 911.         Amount and/or Complexity of Data  Reviewed  Tests in the radiology section of CPT®: reviewed and ordered  Tests in the medicine section of CPT®: ordered and reviewed    Risk of Complications, Morbidity, and/or Mortality  Presenting problems: low  Diagnostic procedures: low  Management options: low    Patient Progress  Patient progress: stable       Final diagnoses:   Closed avulsion fracture of distal fibula, left, initial encounter   Knee strain, right, initial encounter        Disposition:  ED Disposition     ED Disposition Condition Comment    Discharge Stable            Anastasiia Yu, APRN  12/28/21 0705

## 2021-12-28 NOTE — ED PROVIDER NOTES
"Patient is 26 y.o. year old female that presents to the ED for evaluation of left ankle injury.         ED Course:    /81 (BP Location: Left arm, Patient Position: Sitting)   Pulse 98   Temp 98.2 °F (36.8 °C) (Oral)   Resp 20   Ht 172.7 cm (68\")   Wt (!) 152 kg (336 lb)   LMP 10/26/2021   SpO2 98%   BMI 51.09 kg/m²   Results for orders placed or performed during the hospital encounter of 11/10/21   POCT SARS-CoV-2 Antigen ALBERTA   ([facility])    Specimen: Swab   Result Value Ref Range    SARS Antigen Detected (A) Not Detected    Internal Control Passed Passed    Lot Number [id number]     Expiration Date 2/20/23      Medications   HYDROcodone-acetaminophen (NORCO) 5-325 MG per tablet 1 tablet (has no administration in time range)     XR Elbow 2 View Right    Result Date: 12/9/2021  Narrative: PROCEDURE: XR ELBOW 2 VW RIGHT  COMPARISON: None  INDICATIONS: generalized right elbow pain after fall yesterday  FINDINGS:  There is no evidence for displaced fracture or dislocation. No significant joint effusion is observed. No focal osseous abnormalities are identified. The adjacent soft tissues are unremarkable.  CONCLUSION:  1. No evidence for acute displaced fracture or dislocation. 2. No evidence for significant joint effusion.      MERCEDES CLARK MD       Electronically Signed and Approved By: MERCEDES CLARK MD on 12/09/2021 at 17:57             XR Knee 3 View Right    Result Date: 12/27/2021  Narrative: PROCEDURE: XR KNEE 3 VW RIGHT  COMPARISON: [facility], , XR KNEE 3 VW RIGHT, 12/09/2021, 17:38.  INDICATIONS: FALL TODAY, RIGHT KNEE PAIN  FINDINGS:  No acute fracture is identified.  The joint spaces appear well maintained.  A pedunculated osteochondroma is unchanged.  No suprapatellar joint effusion is identified.  CONCLUSION: No acute fracture or traumatic malalignment identified.      MERCEDES HUTCHINSON MD       Electronically Signed and Approved By: MERCEDES HUTCHINSON MD on " 12/27/2021 at 21:59             XR Knee 3 View Right    Result Date: 12/9/2021  Narrative: PROCEDURE: XR KNEE 3 VW RIGHT  COMPARISON: [facility], KNEE 3 VIEWS RT, 8/12/2020, 14:28.  INDICATIONS: generalized right knee pain after fall yesterday  FINDINGS: There is no evidence for displaced fracture or dislocation. No definitive joint effusion is observed.  A pedunculated osteochondromas again noted arising from the medial aspect of the distal metaphysis of the right femur.  The soft tissues are unremarkable.  CONCLUSION:  1. No evidence for displaced fracture or dislocation. 2. A pedunculated osteochondroma is again noted.      MERCEDES CLARK MD       Electronically Signed and Approved By: MERCEDES CLARK MD on 12/09/2021 at 17:59             XR Ankle 3+ View Left    Result Date: 12/27/2021  Narrative: PROCEDURE: XR ANKLE 3+ VW LEFT  COMPARISON: [facility], ANKLE >OR= 3V LT, 8/12/2020, 14:45.  INDICATIONS: FALL TODAY, LEFT ANKLE PAIN  FINDINGS:  There appears to be a tiny avulsion fracture along the tip of the distal fibula.  The mortise is congruent.  The talar dome appears intact.  There are moderate degenerative changes along the dorsal midfoot.  The soft tissues are unremarkable.  CONCLUSION: Suggestion of tiny avulsion fracture along tip of distal fibula.  Correlate with point tenderness.      MERCEDES HUTCHINSON MD       Electronically Signed and Approved By: MERCEDES HUTCHINSON MD on 12/27/2021 at 22:02               MDM:      The case was discussed between the PETERSON and myself. Patient  care including, but not limited to ordered imaging, medications, and lab results were reviewed. I then performed the substantive portion of the visit including all aspects of the medical decision making.        Josue Blancas MD  22:22 EST  12/27/21       Josue Blancas MD  12/27/21 2220

## 2021-12-28 NOTE — DISCHARGE INSTRUCTIONS
Your x-ray shows a tiny evulsion of your left distal fibula which is likely from spraining your ankle.  This is a nonsurgical injury that should heal on its own.  Your knee x-ray was negative  Rest.  Ice.    Wear boot and use crutches until evaluated  Take medications as prescribed  Follow-up with orthopedic doctor as directed

## 2022-07-28 ENCOUNTER — HOSPITAL ENCOUNTER (EMERGENCY)
Facility: HOSPITAL | Age: 27
Discharge: HOME | End: 2022-07-28
Attending: EMERGENCY MEDICINE | Admitting: EMERGENCY MEDICINE

## 2022-07-28 ENCOUNTER — APPOINTMENT (OUTPATIENT)
Dept: GENERAL RADIOLOGY | Facility: HOSPITAL | Age: 27
End: 2022-07-28

## 2022-07-28 VITALS
HEIGHT: 67 IN | TEMPERATURE: 99.2 F | HEART RATE: 99 BPM | OXYGEN SATURATION: 99 % | DIASTOLIC BLOOD PRESSURE: 87 MMHG | WEIGHT: 293 LBS | BODY MASS INDEX: 45.99 KG/M2 | SYSTOLIC BLOOD PRESSURE: 126 MMHG | RESPIRATION RATE: 20 BRPM

## 2022-07-28 DIAGNOSIS — M54.12 CERVICAL RADICULOPATHY: Primary | ICD-10-CM

## 2022-07-28 DIAGNOSIS — M62.838 TRAPEZIUS MUSCLE SPASM: ICD-10-CM

## 2022-07-28 PROCEDURE — 99283 EMERGENCY DEPT VISIT LOW MDM: CPT

## 2022-07-28 RX ORDER — CYCLOBENZAPRINE HCL 10 MG
10 TABLET ORAL ONCE
Status: COMPLETED | OUTPATIENT
Start: 2022-07-28 | End: 2022-07-28

## 2022-07-28 RX ORDER — KETOROLAC TROMETHAMINE 30 MG/ML
60 INJECTION, SOLUTION INTRAMUSCULAR; INTRAVENOUS ONCE
Status: DISCONTINUED | OUTPATIENT
Start: 2022-07-28 | End: 2022-07-28

## 2022-07-28 RX ORDER — CYCLOBENZAPRINE HCL 10 MG
10 TABLET ORAL 3 TIMES DAILY PRN
Qty: 15 TABLET | Refills: 0 | Status: SHIPPED | OUTPATIENT
Start: 2022-07-28 | End: 2022-08-24

## 2022-07-28 RX ORDER — ORPHENADRINE CITRATE 30 MG/ML
60 INJECTION INTRAMUSCULAR; INTRAVENOUS ONCE
Status: DISCONTINUED | OUTPATIENT
Start: 2022-07-28 | End: 2022-07-28

## 2022-07-28 RX ORDER — IBUPROFEN 400 MG/1
800 TABLET ORAL ONCE
Status: COMPLETED | OUTPATIENT
Start: 2022-07-28 | End: 2022-07-28

## 2022-07-28 RX ORDER — PREDNISONE 20 MG/1
TABLET ORAL
Qty: 30 TABLET | Refills: 0 | Status: SHIPPED | OUTPATIENT
Start: 2022-07-28 | End: 2022-08-12

## 2022-07-28 RX ADMIN — IBUPROFEN 800 MG: 400 TABLET, FILM COATED ORAL at 17:12

## 2022-07-28 RX ADMIN — CYCLOBENZAPRINE HYDROCHLORIDE 10 MG: 10 TABLET, FILM COATED ORAL at 17:09

## 2022-08-24 ENCOUNTER — OFFICE VISIT (OUTPATIENT)
Dept: FAMILY MEDICINE CLINIC | Facility: CLINIC | Age: 27
End: 2022-08-24

## 2022-08-24 VITALS
HEIGHT: 67 IN | HEART RATE: 87 BPM | BODY MASS INDEX: 45.99 KG/M2 | DIASTOLIC BLOOD PRESSURE: 90 MMHG | OXYGEN SATURATION: 98 % | SYSTOLIC BLOOD PRESSURE: 138 MMHG | WEIGHT: 293 LBS | TEMPERATURE: 98.2 F | RESPIRATION RATE: 20 BRPM

## 2022-08-24 DIAGNOSIS — Z13.220 SCREENING, LIPID: ICD-10-CM

## 2022-08-24 DIAGNOSIS — E66.01 CLASS 3 SEVERE OBESITY WITH BODY MASS INDEX (BMI) OF 50.0 TO 59.9 IN ADULT, UNSPECIFIED OBESITY TYPE, UNSPECIFIED WHETHER SERIOUS COMORBIDITY PRESENT: Primary | Chronic | ICD-10-CM

## 2022-08-24 DIAGNOSIS — M25.562 ACUTE PAIN OF LEFT KNEE: ICD-10-CM

## 2022-08-24 DIAGNOSIS — Z51.81 MEDICATION MONITORING ENCOUNTER: ICD-10-CM

## 2022-08-24 DIAGNOSIS — Z13.29 SCREENING FOR THYROID DISORDER: ICD-10-CM

## 2022-08-24 DIAGNOSIS — F43.9 STRESS AT HOME: ICD-10-CM

## 2022-08-24 PROBLEM — E66.9 OBESITY: Status: ACTIVE | Noted: 2022-08-24

## 2022-08-24 PROBLEM — H69.80 ETD (EUSTACHIAN TUBE DYSFUNCTION): Status: ACTIVE | Noted: 2017-01-19

## 2022-08-24 PROBLEM — M19.90 ARTHRITIS: Status: ACTIVE | Noted: 2022-08-24

## 2022-08-24 PROBLEM — M54.9 BACK PAIN: Status: ACTIVE | Noted: 2022-08-24

## 2022-08-24 PROBLEM — H69.90 ETD (EUSTACHIAN TUBE DYSFUNCTION): Status: ACTIVE | Noted: 2017-01-19

## 2022-08-24 PROBLEM — E88.81 INSULIN RESISTANCE: Status: ACTIVE | Noted: 2022-08-24

## 2022-08-24 PROBLEM — E88.819 INSULIN RESISTANCE: Status: ACTIVE | Noted: 2022-08-24

## 2022-08-24 PROBLEM — J30.2 SEASONAL ALLERGIES: Status: ACTIVE | Noted: 2022-08-24

## 2022-08-24 PROCEDURE — 99204 OFFICE O/P NEW MOD 45 MIN: CPT

## 2022-08-24 NOTE — PROGRESS NOTES
Chief Complaint   Patient presents with   • Establish Care     Was seeing Dr. Richter in [place].    • Knee Pain     Left knee pain when she is standing up. Knee doesn't hurt when she is sitting. Has had pain for a couple of weeks now and has gotten some better than when it started.    • Stress       Subjective          Jessie Schultz presents to [facility]    She is here to establish care. She was seeing Dr. Richter previously. She is having knee pain in the left knee when standing up. The knee doesn't hurt when she is sitting. This has been going on for a couple weeks but has gotten better than when it started. She is also stating she is under some stress. She states she does want to try to lose weight but is unable to do so herself. She has tried and wants to maybe consider taking medication. I had advised her to get labwork done and we will need a UDS if she wants to start phentermine.      Past History:  Medical History: has a past medical history of Allergic, Allergies, and Prediabetes.   Surgical History: has a past surgical history that includes Carpal tunnel release; Adenoidectomy; Tympanostomy tube placement; and Tonsillectomy.   Family History: family history includes Diabetes in her father, maternal grandmother, mother, and paternal grandfather; Hypertension in her father and mother.   Social History: reports that she has never smoked. She has never used smokeless tobacco. She reports previous alcohol use. She reports that she does not use drugs.  Allergies: Sulfa antibiotics, Sulfamethoxazole, Sulfamethoxazole-trimethoprim, Sulfasalazine, Sulfonylureas, and Sulfacetamide sodium  (Not in a hospital admission)       Social History     Socioeconomic History   • Marital status: Single   Tobacco Use   • Smoking status: Never Smoker   • Smokeless tobacco: Never Used   Vaping Use   • Vaping Use: Never used   Substance and Sexual Activity   • Alcohol use: Not Currently   •  "Drug use: Never   • Sexual activity: Defer       There are no preventive care reminders to display for this patient.    Objective     Vital Signs:   /90 (BP Location: Right arm, Patient Position: Sitting)   Pulse 87   Temp 98.2 °F (36.8 °C) (Infrared)   Resp 20   Ht 170.2 cm (67\")   Wt (!) 156 kg (344 lb 3.2 oz)   SpO2 98%   BMI 53.91 kg/m²       Physical Exam  Constitutional:       Appearance: Normal appearance. She is obese.   HENT:      Nose: Nose normal.      Mouth/Throat:      Mouth: Mucous membranes are moist.   Cardiovascular:      Rate and Rhythm: Normal rate and regular rhythm.      Pulses: Normal pulses.      Heart sounds: Normal heart sounds.   Pulmonary:      Effort: Pulmonary effort is normal.      Breath sounds: Normal breath sounds.   Musculoskeletal:         General: Tenderness (left knee pain) present.   Skin:     General: Skin is warm and dry.   Neurological:      General: No focal deficit present.      Mental Status: She is alert and oriented to person, place, and time.   Psychiatric:         Mood and Affect: Mood normal.         Behavior: Behavior normal.          Review of Systems   Psychiatric/Behavioral: Positive for stress.   All other systems reviewed and are negative.       Result Review :{Labs  Result Review  Imaging  Med Tab  Media  Procedures :23}                 Assessment and Plan    Diagnoses and all orders for this visit:    1. Class 3 severe obesity with body mass index (BMI) of 50.0 to 59.9 in adult, unspecified obesity type, unspecified whether serious comorbidity present (HCC) (Primary)  -     CBC w AUTO Differential; Future  -     Comprehensive metabolic panel; Future    2. Acute pain of left knee  -     XR Knee 1 or 2 View Left; Future    3. Stress at home  Comments:  Stressed about paying bills.   States she is not depressed.     4. Screening, lipid  -     Lipid panel; Future    5. Screening for thyroid disorder  -     TSH+Free T4; Future    6. Medication " monitoring encounter          Pt thought to be clinically stable at this time.    Follow Up   No follow-ups on file.  Patient was given instructions and counseling regarding her condition or for health maintenance advice. Please see specific information pulled into the AVS if appropriate.        verbal cues/1 person assist

## 2022-08-29 ENCOUNTER — LAB (OUTPATIENT)
Dept: LAB | Facility: HOSPITAL | Age: 27
End: 2022-08-29

## 2022-08-29 ENCOUNTER — TELEPHONE (OUTPATIENT)
Dept: FAMILY MEDICINE CLINIC | Facility: CLINIC | Age: 27
End: 2022-08-29

## 2022-08-29 ENCOUNTER — HOSPITAL ENCOUNTER (OUTPATIENT)
Dept: GENERAL RADIOLOGY | Facility: HOSPITAL | Age: 27
Discharge: HOME | End: 2022-08-29

## 2022-08-29 ENCOUNTER — CLINICAL SUPPORT (OUTPATIENT)
Dept: FAMILY MEDICINE CLINIC | Facility: CLINIC | Age: 27
End: 2022-08-29

## 2022-08-29 DIAGNOSIS — E66.01 CLASS 3 SEVERE OBESITY WITH BODY MASS INDEX (BMI) OF 50.0 TO 59.9 IN ADULT, UNSPECIFIED OBESITY TYPE, UNSPECIFIED WHETHER SERIOUS COMORBIDITY PRESENT: ICD-10-CM

## 2022-08-29 DIAGNOSIS — M25.562 ACUTE PAIN OF LEFT KNEE: ICD-10-CM

## 2022-08-29 DIAGNOSIS — Z13.220 SCREENING, LIPID: ICD-10-CM

## 2022-08-29 DIAGNOSIS — Z13.29 SCREENING FOR THYROID DISORDER: ICD-10-CM

## 2022-08-29 DIAGNOSIS — Z79.899 ENCOUNTER FOR MEDICATION MANAGEMENT: Primary | ICD-10-CM

## 2022-08-29 LAB
ALBUMIN SERPL-MCNC: 4.6 G/DL (ref 3.5–5.2)
ALBUMIN/GLOB SERPL: 1.8 G/DL
ALP SERPL-CCNC: 78 U/L (ref 39–117)
ALT SERPL W P-5'-P-CCNC: 24 U/L (ref 1–33)
ANION GAP SERPL CALCULATED.3IONS-SCNC: 11 MMOL/L (ref 5–15)
AST SERPL-CCNC: 20 U/L (ref 1–32)
BASOPHILS # BLD AUTO: 0.02 10*3/MM3 (ref 0–0.2)
BASOPHILS NFR BLD AUTO: 0.2 % (ref 0–1.5)
BILIRUB SERPL-MCNC: 0.2 MG/DL (ref 0–1.2)
BUN SERPL-MCNC: 8 MG/DL (ref 6–20)
BUN/CREAT SERPL: 14.3 (ref 7–25)
CALCIUM SPEC-SCNC: 9.9 MG/DL (ref 8.6–10.5)
CHLORIDE SERPL-SCNC: 105 MMOL/L (ref 98–107)
CHOLEST SERPL-MCNC: 265 MG/DL (ref 0–200)
CO2 SERPL-SCNC: 25 MMOL/L (ref 22–29)
CREAT SERPL-MCNC: 0.56 MG/DL (ref 0.57–1)
DEPRECATED RDW RBC AUTO: 37.9 FL (ref 37–54)
EGFRCR SERPLBLD CKD-EPI 2021: 128.5 ML/MIN/1.73
EOSINOPHIL # BLD AUTO: 0.29 10*3/MM3 (ref 0–0.4)
EOSINOPHIL NFR BLD AUTO: 3.2 % (ref 0.3–6.2)
ERYTHROCYTE [DISTWIDTH] IN BLOOD BY AUTOMATED COUNT: 12.3 % (ref 12.3–15.4)
GLOBULIN UR ELPH-MCNC: 2.6 GM/DL
GLUCOSE SERPL-MCNC: 100 MG/DL (ref 65–99)
HCT VFR BLD AUTO: 42.9 % (ref 34–46.6)
HDLC SERPL-MCNC: 38 MG/DL (ref 40–60)
HGB BLD-MCNC: 14.1 G/DL (ref 12–15.9)
IMM GRANULOCYTES # BLD AUTO: 0.05 10*3/MM3 (ref 0–0.05)
IMM GRANULOCYTES NFR BLD AUTO: 0.5 % (ref 0–0.5)
LDLC SERPL CALC-MCNC: 183 MG/DL (ref 0–100)
LDLC/HDLC SERPL: 4.76 {RATIO}
LYMPHOCYTES # BLD AUTO: 2.95 10*3/MM3 (ref 0.7–3.1)
LYMPHOCYTES NFR BLD AUTO: 32.3 % (ref 19.6–45.3)
MCH RBC QN AUTO: 28.2 PG (ref 26.6–33)
MCHC RBC AUTO-ENTMCNC: 32.9 G/DL (ref 31.5–35.7)
MCV RBC AUTO: 85.8 FL (ref 79–97)
MONOCYTES # BLD AUTO: 0.43 10*3/MM3 (ref 0.1–0.9)
MONOCYTES NFR BLD AUTO: 4.7 % (ref 5–12)
NEUTROPHILS NFR BLD AUTO: 5.38 10*3/MM3 (ref 1.7–7)
NEUTROPHILS NFR BLD AUTO: 59.1 % (ref 42.7–76)
NRBC BLD AUTO-RTO: 0 /100 WBC (ref 0–0.2)
PLATELET # BLD AUTO: 372 10*3/MM3 (ref 140–450)
PMV BLD AUTO: 9.4 FL (ref 6–12)
POC AMPHETAMINES: NEGATIVE
POC BARBITURATES: NEGATIVE
POC BENZODIAZEPHINES: NEGATIVE
POC COCAINE: NEGATIVE
POC METHADONE: NEGATIVE
POC METHAMPHETAMINE SCREEN URINE: NEGATIVE
POC OPIATES: NEGATIVE
POC OXYCODONE: NEGATIVE
POC PHENCYCLIDINE: NEGATIVE
POC PROPOXYPHENE: NEGATIVE
POC THC: NEGATIVE
POC TRICYCLIC ANTIDEPRESSANTS: NEGATIVE
POTASSIUM SERPL-SCNC: 4.4 MMOL/L (ref 3.5–5.2)
PROT SERPL-MCNC: 7.2 G/DL (ref 6–8.5)
RBC # BLD AUTO: 5 10*6/MM3 (ref 3.77–5.28)
SODIUM SERPL-SCNC: 141 MMOL/L (ref 136–145)
T4 FREE SERPL-MCNC: 1.05 NG/DL (ref 0.93–1.7)
TRIGL SERPL-MCNC: 231 MG/DL (ref 0–150)
TSH SERPL DL<=0.05 MIU/L-ACNC: 2.88 UIU/ML (ref 0.27–4.2)
VLDLC SERPL-MCNC: 44 MG/DL (ref 5–40)
WBC NRBC COR # BLD: 9.12 10*3/MM3 (ref 3.4–10.8)

## 2022-08-29 PROCEDURE — 84443 ASSAY THYROID STIM HORMONE: CPT

## 2022-08-29 PROCEDURE — 73560 X-RAY EXAM OF KNEE 1 OR 2: CPT

## 2022-08-29 PROCEDURE — 36415 COLL VENOUS BLD VENIPUNCTURE: CPT

## 2022-08-29 PROCEDURE — 80305 DRUG TEST PRSMV DIR OPT OBS: CPT

## 2022-08-29 PROCEDURE — 80053 COMPREHEN METABOLIC PANEL: CPT

## 2022-08-29 PROCEDURE — 80061 LIPID PANEL: CPT

## 2022-08-29 PROCEDURE — 84439 ASSAY OF FREE THYROXINE: CPT

## 2022-08-29 PROCEDURE — 85025 COMPLETE CBC W/AUTO DIFF WBC: CPT

## 2022-08-29 RX ORDER — PHENTERMINE HYDROCHLORIDE 37.5 MG/1
37.5 CAPSULE ORAL EVERY MORNING
Qty: 30 CAPSULE | Refills: 0 | Status: CANCELLED | OUTPATIENT
Start: 2022-08-29

## 2022-08-29 RX ORDER — PHENTERMINE HYDROCHLORIDE 15 MG/1
CAPSULE ORAL
Qty: 45 CAPSULE | Refills: 0 | Status: SHIPPED | OUTPATIENT
Start: 2022-08-29 | End: 2022-09-26 | Stop reason: SDUPTHER

## 2022-08-31 ENCOUNTER — TELEPHONE (OUTPATIENT)
Dept: FAMILY MEDICINE CLINIC | Facility: CLINIC | Age: 27
End: 2022-08-31

## 2022-08-31 NOTE — TELEPHONE ENCOUNTER
I tried doing the Prior Auth and medication wasn't covered. Spoke with Rashida at Pharmacy and was told for pt's next prescription it will be cheaper for pt to get the Phentermine 30mg 1 po QD instead of taking the 15mg bid. Pt is going to pay for prescription as her insurance doesn't cover it.

## 2022-08-31 NOTE — TELEPHONE ENCOUNTER
Caller: Jessie Schultz    Relationship to patient: Self    Best call back number: [phone]    Patient is needing: PATIENT CALLED IN AND SAID THAT SHE IS WANTING TO HAVE HER P/A FOR PHENTERMINE TO BE CANCELED AND SHE IS JUST GOING TO GO AHEAD AND PAY THE $15 CO-PAY

## 2022-08-31 NOTE — TELEPHONE ENCOUNTER
Caller: Jessie Schultz    Relationship to patient: Self    Best call back number: [phone]     Patient is needing: PATIENT STATED THAT HER PHARMACY INFORMED THAT THE PHENTERMINE IS NEEDING MEDICATION PRIOR AUTHORIZATION  [facility] - [address] [phone] [facility] [phone] FX

## 2022-09-26 ENCOUNTER — OFFICE VISIT (OUTPATIENT)
Dept: FAMILY MEDICINE CLINIC | Facility: CLINIC | Age: 27
End: 2022-09-26

## 2022-09-26 VITALS
OXYGEN SATURATION: 97 % | TEMPERATURE: 98.2 F | WEIGHT: 293 LBS | DIASTOLIC BLOOD PRESSURE: 100 MMHG | HEART RATE: 90 BPM | SYSTOLIC BLOOD PRESSURE: 149 MMHG | BODY MASS INDEX: 45.99 KG/M2 | HEIGHT: 67 IN

## 2022-09-26 DIAGNOSIS — E66.01 CLASS 3 SEVERE OBESITY WITHOUT SERIOUS COMORBIDITY WITH BODY MASS INDEX (BMI) OF 50.0 TO 59.9 IN ADULT, UNSPECIFIED OBESITY TYPE: Primary | Chronic | ICD-10-CM

## 2022-09-26 DIAGNOSIS — R73.03 PREDIABETES: ICD-10-CM

## 2022-09-26 DIAGNOSIS — G56.02 CARPAL TUNNEL SYNDROME ON LEFT: ICD-10-CM

## 2022-09-26 PROBLEM — E66.813 CLASS 3 SEVERE OBESITY WITHOUT SERIOUS COMORBIDITY WITH BODY MASS INDEX (BMI) OF 50.0 TO 59.9 IN ADULT: Status: ACTIVE | Noted: 2022-08-24

## 2022-09-26 PROCEDURE — 99214 OFFICE O/P EST MOD 30 MIN: CPT

## 2022-09-26 RX ORDER — PHENTERMINE HYDROCHLORIDE 15 MG/1
CAPSULE ORAL
Qty: 45 CAPSULE | Refills: 0 | Status: SHIPPED | OUTPATIENT
Start: 2022-09-26 | End: 2022-09-29 | Stop reason: SDUPTHER

## 2022-09-26 NOTE — ASSESSMENT & PLAN NOTE
Patient's (Body mass index is 54.39 kg/m².) indicates that they are morbidly obese (BMI > 40 or > 35 with obesity - related health condition) with health conditions that include none . Weight is worsening. BMI is is above average; BMI management plan is completed. We discussed portion control and increasing exercise.

## 2022-09-29 DIAGNOSIS — E66.01 CLASS 3 SEVERE OBESITY WITHOUT SERIOUS COMORBIDITY WITH BODY MASS INDEX (BMI) OF 50.0 TO 59.9 IN ADULT, UNSPECIFIED OBESITY TYPE: Chronic | ICD-10-CM

## 2022-09-29 NOTE — PROGRESS NOTES
Chief Complaint   Patient presents with   • Obesity     Follow up on the Phentermine has gained 3 pounds.    • Wrist Pain     Left wrist has been hurting and going numb, states she has carpel tunnel.        Subjective          Jessie Schultz presents to [facility]    History of Present Illness  She is here to be seen for a follow up on her weight. She has gained 3 lbs in the last month. She is taking phentermine. She does want to give this another month and see if she can lose some weight. She has also been having wrist pain in her left wrist. She wears a brace for carpal tunnel but the brace broke recently and she thinks that's why she's having the wrist pain again.       Past History:  Medical History: has a past medical history of Allergic, Allergies, and Prediabetes.   Surgical History: has a past surgical history that includes Carpal tunnel release; Adenoidectomy; Tympanostomy tube placement; and Tonsillectomy.   Family History: family history includes Diabetes in her father, maternal grandmother, mother, and paternal grandfather; Hypertension in her father and mother.   Social History: reports that she has never smoked. She has never used smokeless tobacco. She reports previous alcohol use. She reports that she does not use drugs.  Allergies: Sulfa antibiotics, Sulfamethoxazole, Sulfamethoxazole-trimethoprim, Sulfasalazine, Sulfonylureas, and Sulfacetamide sodium  (Not in a hospital admission)       Social History     Socioeconomic History   • Marital status: Single   Tobacco Use   • Smoking status: Never Smoker   • Smokeless tobacco: Never Used   Vaping Use   • Vaping Use: Never used   Substance and Sexual Activity   • Alcohol use: Not Currently   • Drug use: Never   • Sexual activity: Defer       Health Maintenance Due   Topic Date Due   • ANNUAL PHYSICAL  Never done   • HEPATITIS C SCREENING  Never done   • PAP SMEAR  Never done   • COVID-19 Vaccine (2 - Pfizer series)  "03/05/2022   • INFLUENZA VACCINE  08/01/2022       Objective     Vital Signs:   /100 (BP Location: Left arm, Patient Position: Sitting)   Pulse 90   Temp 98.2 °F (36.8 °C) (Infrared)   Ht 170.2 cm (67\")   Wt (!) 158 kg (347 lb 4.8 oz)   SpO2 97%   BMI 54.39 kg/m²       Physical Exam  Constitutional:       Appearance: Normal appearance.   HENT:      Nose: Nose normal.      Mouth/Throat:      Mouth: Mucous membranes are moist.   Cardiovascular:      Rate and Rhythm: Normal rate and regular rhythm.      Pulses: Normal pulses.   Pulmonary:      Effort: Pulmonary effort is normal.      Breath sounds: Normal breath sounds.   Skin:     General: Skin is warm and dry.   Neurological:      General: No focal deficit present.      Mental Status: She is alert and oriented to person, place, and time.   Psychiatric:         Mood and Affect: Mood normal.         Behavior: Behavior normal.          Review of Systems   Musculoskeletal: Positive for arthralgias (left wrist).   All other systems reviewed and are negative.       Result Review :                 Assessment and Plan    Diagnoses and all orders for this visit:    1. Class 3 severe obesity without serious comorbidity with body mass index (BMI) of 50.0 to 59.9 in adult, unspecified obesity type (HCC) (Primary)  Assessment & Plan:  Patient's (Body mass index is 54.39 kg/m².) indicates that they are morbidly obese (BMI > 40 or > 35 with obesity - related health condition) with health conditions that include none . Weight is worsening. BMI is is above average; BMI management plan is completed. We discussed portion control and increasing exercise.     Orders:  -     phentermine 15 MG capsule; Take 1 tablet daily in the morning for 15 days then increase to 2 tablets in the morning.  Dispense: 45 capsule; Refill: 0    2. Prediabetes    3. Carpal tunnel syndrome on left  -     Misc. Devices (Wrist Brace) misc; 1 each 1 (One) Time for 1 dose.  Dispense: 1 each; Refill: " 0        Pt thought to be clinically stable at this time.    Follow Up   No follow-ups on file.  Patient was given instructions and counseling regarding her condition or for health maintenance advice. Please see specific information pulled into the AVS if appropriate.

## 2022-09-30 RX ORDER — PHENTERMINE HYDROCHLORIDE 15 MG/1
CAPSULE ORAL
Qty: 45 CAPSULE | Refills: 0 | Status: SHIPPED | OUTPATIENT
Start: 2022-09-30 | End: 2022-12-16

## 2022-12-16 PROCEDURE — U0004 COV-19 TEST NON-CDC HGH THRU: HCPCS | Performed by: NURSE PRACTITIONER

## 2022-12-17 ENCOUNTER — TELEPHONE (OUTPATIENT)
Dept: URGENT CARE | Facility: CLINIC | Age: 27
End: 2022-12-17

## 2022-12-17 NOTE — TELEPHONE ENCOUNTER
----- Message from MARIA E Borjas sent at 12/17/2022  9:28 AM EST -----  Please notify patient of negative COVID 19 test result.

## 2022-12-29 ENCOUNTER — APPOINTMENT (OUTPATIENT)
Dept: GENERAL RADIOLOGY | Facility: HOSPITAL | Age: 27
End: 2022-12-29
Payer: COMMERCIAL

## 2022-12-29 ENCOUNTER — HOSPITAL ENCOUNTER (EMERGENCY)
Facility: HOSPITAL | Age: 27
Discharge: HOME | End: 2022-12-29
Attending: EMERGENCY MEDICINE | Admitting: EMERGENCY MEDICINE
Payer: COMMERCIAL

## 2022-12-29 VITALS
DIASTOLIC BLOOD PRESSURE: 81 MMHG | SYSTOLIC BLOOD PRESSURE: 126 MMHG | OXYGEN SATURATION: 99 % | BODY MASS INDEX: 45.99 KG/M2 | HEART RATE: 82 BPM | RESPIRATION RATE: 14 BRPM | WEIGHT: 293 LBS | TEMPERATURE: 98.1 F | HEIGHT: 67 IN

## 2022-12-29 DIAGNOSIS — T14.8XXA BRUISE: ICD-10-CM

## 2022-12-29 DIAGNOSIS — R20.2 PARESTHESIA OF RIGHT ARM: ICD-10-CM

## 2022-12-29 DIAGNOSIS — M79.2 RADICULAR PAIN IN RIGHT ARM: Primary | ICD-10-CM

## 2022-12-29 PROCEDURE — 25010000002 KETOROLAC TROMETHAMINE PER 15 MG: Performed by: NURSE PRACTITIONER

## 2022-12-29 PROCEDURE — 96374 THER/PROPH/DIAG INJ IV PUSH: CPT

## 2022-12-29 PROCEDURE — 99282 EMERGENCY DEPT VISIT SF MDM: CPT

## 2022-12-29 PROCEDURE — 73130 X-RAY EXAM OF HAND: CPT

## 2022-12-29 RX ORDER — KETOROLAC TROMETHAMINE 30 MG/ML
60 INJECTION, SOLUTION INTRAMUSCULAR; INTRAVENOUS ONCE
Status: COMPLETED | OUTPATIENT
Start: 2022-12-29 | End: 2022-12-29

## 2022-12-29 RX ORDER — KETOROLAC TROMETHAMINE 10 MG/1
10 TABLET, FILM COATED ORAL EVERY 6 HOURS PRN
Qty: 20 TABLET | Refills: 0 | Status: SHIPPED | OUTPATIENT
Start: 2022-12-29 | End: 2023-01-26

## 2022-12-29 RX ORDER — PREDNISONE 20 MG/1
60 TABLET ORAL DAILY
Qty: 15 TABLET | Refills: 0 | Status: SHIPPED | OUTPATIENT
Start: 2022-12-29 | End: 2023-01-03

## 2022-12-29 RX ADMIN — KETOROLAC TROMETHAMINE 60 MG: 30 INJECTION, SOLUTION INTRAMUSCULAR at 03:18

## 2022-12-29 NOTE — DISCHARGE INSTRUCTIONS
Hand x-ray was negative and did not show any acute abnormality.    Your symptoms are likely from a pinched nerve     Ice to affected area.    Take medications as prescribed.    May wear your previous carpal tunnel brace for comfort.    Elevate.    Follow-up with your PCP for additional testing or treatment as indicated

## 2022-12-29 NOTE — Clinical Note
[facility] EMERGENCY ROOM  [address]  [place] [address]  Phone: [phone]    Jessie Schultz was seen and treated in our emergency department on 12/29/2022.  She may return to work on 12/30/2022.         Thank you for choosing [facility].    Leanna Modi RN

## 2022-12-31 ENCOUNTER — HOSPITAL ENCOUNTER (EMERGENCY)
Facility: HOSPITAL | Age: 27
Discharge: LEFT BEFORE DISCHARGE | End: 2022-12-31
Payer: COMMERCIAL

## 2022-12-31 VITALS
SYSTOLIC BLOOD PRESSURE: 142 MMHG | OXYGEN SATURATION: 100 % | HEIGHT: 67 IN | BODY MASS INDEX: 45.99 KG/M2 | HEART RATE: 82 BPM | WEIGHT: 293 LBS | TEMPERATURE: 98.1 F | RESPIRATION RATE: 18 BRPM | DIASTOLIC BLOOD PRESSURE: 98 MMHG

## 2022-12-31 PROCEDURE — 99211 OFF/OP EST MAY X REQ PHY/QHP: CPT

## 2023-01-11 ENCOUNTER — OFFICE VISIT (OUTPATIENT)
Dept: FAMILY MEDICINE CLINIC | Facility: CLINIC | Age: 28
End: 2023-01-11
Payer: COMMERCIAL

## 2023-01-11 VITALS
OXYGEN SATURATION: 98 % | SYSTOLIC BLOOD PRESSURE: 137 MMHG | WEIGHT: 293 LBS | HEART RATE: 85 BPM | HEIGHT: 67 IN | BODY MASS INDEX: 45.99 KG/M2 | TEMPERATURE: 98.6 F | DIASTOLIC BLOOD PRESSURE: 76 MMHG

## 2023-01-11 DIAGNOSIS — Z98.890 HISTORY OF CARPAL TUNNEL SURGERY: ICD-10-CM

## 2023-01-11 DIAGNOSIS — E66.01 CLASS 3 SEVERE OBESITY WITHOUT SERIOUS COMORBIDITY WITH BODY MASS INDEX (BMI) OF 50.0 TO 59.9 IN ADULT, UNSPECIFIED OBESITY TYPE: Chronic | ICD-10-CM

## 2023-01-11 DIAGNOSIS — M25.532 BILATERAL WRIST PAIN: Primary | ICD-10-CM

## 2023-01-11 DIAGNOSIS — G47.00 INSOMNIA, UNSPECIFIED TYPE: ICD-10-CM

## 2023-01-11 DIAGNOSIS — M25.531 BILATERAL WRIST PAIN: Primary | ICD-10-CM

## 2023-01-11 PROCEDURE — 99213 OFFICE O/P EST LOW 20 MIN: CPT

## 2023-01-11 RX ORDER — TRAZODONE HYDROCHLORIDE 50 MG/1
50 TABLET ORAL NIGHTLY
Qty: 30 TABLET | Refills: 0 | Status: SHIPPED | OUTPATIENT
Start: 2023-01-11 | End: 2023-01-26 | Stop reason: ALTCHOICE

## 2023-01-11 NOTE — PROGRESS NOTES
Chief Complaint   Patient presents with   • Wrist Pain     Bilateral wrist pain. Her right wrist goes numb and then she has numbness in fingers on left hand. She went to the ED was given some medication that didn't help. Her right wrist will swell and has been trying to wear a brace on it.        Subjective          Jessie Schultz presents to [facility]    History of Present Illness  She is here today to be seen for wrist pain bilaterally. She has had carpal tunnel surgery before on her right wrist and believes it may be back as it feels the same. She is now having issues with her left wrist as well. She is also having issues sleeping well. She has been trying melatonin. She is willing to try trazodone for sleep.       Past History:  Medical History: has a past medical history of Allergic, Allergies, and Prediabetes.   Surgical History: has a past surgical history that includes Carpal tunnel release; Adenoidectomy; Tympanostomy tube placement; and Tonsillectomy.   Family History: family history includes Diabetes in her father, maternal grandmother, mother, and paternal grandfather; Hypertension in her father and mother.   Social History: reports that she has never smoked. She has never used smokeless tobacco. She reports that she does not currently use alcohol. She reports that she does not use drugs.  Allergies: Sulfa antibiotics, Sulfamethoxazole, Sulfamethoxazole-trimethoprim, Sulfasalazine, Sulfonylureas, and Sulfacetamide sodium  (Not in a hospital admission)       Social History     Socioeconomic History   • Marital status: Single   Tobacco Use   • Smoking status: Never   • Smokeless tobacco: Never   Vaping Use   • Vaping Use: Never used   Substance and Sexual Activity   • Alcohol use: Not Currently   • Drug use: Never   • Sexual activity: Defer       Health Maintenance Due   Topic Date Due   • HEPATITIS C SCREENING  Never done       Objective     Vital Signs:   /76  "(BP Location: Left arm, Patient Position: Sitting)   Pulse 85   Temp 98.6 °F (37 °C) (Infrared)   Ht 170.2 cm (67\")   Wt (!) 156 kg (345 lb)   SpO2 98%   BMI 54.03 kg/m²       Physical Exam  Constitutional:       Appearance: Normal appearance.   HENT:      Nose: Nose normal.      Mouth/Throat:      Mouth: Mucous membranes are moist.   Cardiovascular:      Rate and Rhythm: Normal rate.      Pulses: Normal pulses.   Pulmonary:      Effort: Pulmonary effort is normal.   Skin:     General: Skin is warm and dry.   Neurological:      General: No focal deficit present.      Mental Status: She is alert and oriented to person, place, and time.   Psychiatric:         Mood and Affect: Mood normal.         Behavior: Behavior normal.          Review of Systems   Musculoskeletal: Positive for arthralgias (PADMINI wrists).   Psychiatric/Behavioral: Positive for sleep disturbance.   All other systems reviewed and are negative.       Result Review :                 Assessment and Plan    Diagnoses and all orders for this visit:    1. Bilateral wrist pain (Primary)  -     EMG & Nerve Conduction Test; Future    2. History of carpal tunnel surgery  -     EMG & Nerve Conduction Test; Future    3. Class 3 severe obesity without serious comorbidity with body mass index (BMI) of 50.0 to 59.9 in adult, unspecified obesity type (HCC)  Assessment & Plan:  Patient's (Body mass index is 54.03 kg/m².) indicates that they are morbidly/severely obese (BMI > 40 or > 35 with obesity - related health condition) with health conditions that include none . Weight is unchanged. BMI is is above average; BMI management plan is completed. We discussed portion control and increasing exercise.       4. Insomnia, unspecified type  -     traZODone (DESYREL) 50 MG tablet; Take 1 tablet by mouth Every Night for 30 days.  Dispense: 30 tablet; Refill: 0    I spent 30 minutes caring for Jessie on this date of service. This time includes time spent by me in the " following activities:preparing for the visit, reviewing tests, obtaining and/or reviewing a separately obtained history, performing a medically appropriate examination and/or evaluation , counseling and educating the patient/family/caregiver, ordering medications, tests, or procedures and documenting information in the medical record    Pt thought to be clinically stable at this time.    Follow Up   No follow-ups on file.  Patient was given instructions and counseling regarding her condition or for health maintenance advice. Please see specific information pulled into the AVS if appropriate.

## 2023-01-11 NOTE — ASSESSMENT & PLAN NOTE
Patient's (Body mass index is 54.03 kg/m².) indicates that they are morbidly/severely obese (BMI > 40 or > 35 with obesity - related health condition) with health conditions that include none . Weight is unchanged. BMI is is above average; BMI management plan is completed. We discussed portion control and increasing exercise.

## 2023-01-26 ENCOUNTER — OFFICE VISIT (OUTPATIENT)
Dept: FAMILY MEDICINE CLINIC | Facility: CLINIC | Age: 28
End: 2023-01-26
Payer: COMMERCIAL

## 2023-01-26 VITALS
OXYGEN SATURATION: 99 % | DIASTOLIC BLOOD PRESSURE: 98 MMHG | TEMPERATURE: 97.6 F | SYSTOLIC BLOOD PRESSURE: 146 MMHG | HEART RATE: 88 BPM | HEIGHT: 67 IN | WEIGHT: 293 LBS | BODY MASS INDEX: 45.99 KG/M2

## 2023-01-26 DIAGNOSIS — R20.0 NUMBNESS: ICD-10-CM

## 2023-01-26 DIAGNOSIS — J01.10 ACUTE NON-RECURRENT FRONTAL SINUSITIS: ICD-10-CM

## 2023-01-26 DIAGNOSIS — M79.601 PAIN OF RIGHT UPPER EXTREMITY: ICD-10-CM

## 2023-01-26 DIAGNOSIS — H57.9 EYE PRESSURE: ICD-10-CM

## 2023-01-26 DIAGNOSIS — H92.09 OTALGIA, UNSPECIFIED LATERALITY: Primary | ICD-10-CM

## 2023-01-26 PROCEDURE — 99214 OFFICE O/P EST MOD 30 MIN: CPT | Performed by: NURSE PRACTITIONER

## 2023-01-26 RX ORDER — METHYLPREDNISOLONE 4 MG/1
TABLET ORAL
Qty: 1 EACH | Refills: 0 | Status: SHIPPED | OUTPATIENT
Start: 2023-01-26 | End: 2023-02-19

## 2023-01-26 RX ORDER — AMITRIPTYLINE HYDROCHLORIDE 25 MG/1
25 TABLET, FILM COATED ORAL NIGHTLY
Qty: 30 TABLET | Refills: 2 | Status: SHIPPED | OUTPATIENT
Start: 2023-01-26 | End: 2023-02-27 | Stop reason: SDUPTHER

## 2023-01-26 NOTE — PROGRESS NOTES
"Chief Complaint  Earache (Pt went to urgent care on 1/20/2023 and was diagnosed with bacterial infection, was given amoxicillin but she is not feeling any better.), Eye Pain, and Arm Pain    Subjective        Jessie Schultz presents to [facility]  History of Present Illness  Pt presents c/o continued ear pain, sinus pain/pressure, pain behind eyes. She was prescribed amox which she has not yet finished, but states she is not feeling better. Denies fever, chills, SOB, N/V/D/C, abd pain, HA, sore throat or other. Pt also continues to c/o R arm pain/numbness. Hx of carpal tunnel release sx. Has appt with neuro in May for EMG. States she is wearing wrist brace but it is not helping. Does not take any medication to treat.     Reviewed all recent labs and medications.      Objective   Vital Signs:  /98   Pulse 88   Temp 97.6 °F (36.4 °C) (Temporal)   Ht 170.2 cm (67\")   Wt (!) 154 kg (339 lb 8 oz)   SpO2 99%   BMI 53.17 kg/m²   Estimated body mass index is 53.17 kg/m² as calculated from the following:    Height as of this encounter: 170.2 cm (67\").    Weight as of this encounter: 154 kg (339 lb 8 oz).             Physical Exam  Vitals reviewed.   Constitutional:       General: She is not in acute distress.     Appearance: Normal appearance.   HENT:      Head: Normocephalic.      Comments: Posterior pharynx drainage      Ears:      Comments: bilat ear fullness      Nose: Congestion present.      Right Sinus: Frontal sinus tenderness present.      Left Sinus: Frontal sinus tenderness present.      Mouth/Throat:      Pharynx: Oropharynx is clear. No posterior oropharyngeal erythema.   Eyes:      General: No scleral icterus.     Extraocular Movements: Extraocular movements intact.      Conjunctiva/sclera: Conjunctivae normal.      Pupils: Pupils are equal, round, and reactive to light.   Cardiovascular:      Rate and Rhythm: Normal rate and regular rhythm.      Pulses: Normal " pulses.      Heart sounds: Normal heart sounds.   Pulmonary:      Effort: Pulmonary effort is normal.      Breath sounds: Normal breath sounds.   Abdominal:      General: Bowel sounds are normal.      Palpations: Abdomen is soft.   Musculoskeletal:         General: Normal range of motion.      Cervical back: Neck supple.   Skin:     General: Skin is warm and dry.   Neurological:      Mental Status: She is alert and oriented to person, place, and time.   Psychiatric:         Mood and Affect: Mood normal.         Behavior: Behavior normal.         Thought Content: Thought content normal.         Judgment: Judgment normal.        Result Review :    Common labs    Common Labs 8/29/22 8/29/22 8/29/22    1309 1309 1309   Glucose  100 (A)    BUN  8    Creatinine  0.56 (A)    Sodium  141    Potassium  4.4    Chloride  105    Calcium  9.9    Albumin  4.60    Total Bilirubin  0.2    Alkaline Phosphatase  78    AST (SGOT)  20    ALT (SGPT)  24    WBC 9.12     Hemoglobin 14.1     Hematocrit 42.9     Platelets 372     Total Cholesterol   265 (A)   Triglycerides   231 (A)   HDL Cholesterol   38 (A)   LDL Cholesterol    183 (A)   (A) Abnormal value            Data reviewed: Radiologic studies XR arm and Consultant notes UC/ED             Assessment and Plan   Diagnoses and all orders for this visit:    1. Otalgia, unspecified laterality (Primary)  Comments:  Continue amox as prescribed     2. Eye pressure  Comments:  Likely d/t sinus congestion   medrol dose gerardo UAD     3. Acute non-recurrent frontal sinusitis  Comments:  Continue amox as prescribed   medrol dose gerardo UAD   increas rest / clear fluids     4. Pain of right upper extremity  Comments:  Keep all FU with neuro for EMG/nerve conduction testing   Continue to wear wrist braces as much as possible       5. Numbness  Comments:  start amytriptyine 25mg PO qhs   Advised to DC trazodone, may resume if amytriptyline does not work, but do not take together  consider PT,  injection    Other orders  -     Elastic Bandages & Supports (Wrist Brace//Left Large) misc; 1 each Daily.  Dispense: 1 each; Refill: 0  -     methylPREDNISolone (MEDROL) 4 MG dose pack; Take as directed on package instructions.  Dispense: 1 each; Refill: 0  -     amitriptyline (ELAVIL) 25 MG tablet; Take 1 tablet by mouth Every Night.  Dispense: 30 tablet; Refill: 2             Follow Up   Return in about 4 weeks (around 2/23/2023).  Patient was given instructions and counseling regarding her condition or for health maintenance advice. Please see specific information pulled into the AVS if appropriate.

## 2023-02-19 PROCEDURE — 87081 CULTURE SCREEN ONLY: CPT | Performed by: STUDENT IN AN ORGANIZED HEALTH CARE EDUCATION/TRAINING PROGRAM

## 2023-02-22 ENCOUNTER — TELEPHONE (OUTPATIENT)
Dept: URGENT CARE | Facility: CLINIC | Age: 28
End: 2023-02-22
Payer: COMMERCIAL

## 2023-02-22 NOTE — TELEPHONE ENCOUNTER
----- Message from MARIA E Man sent at 2/22/2023 12:39 PM EST -----  Please call the patient regarding her normal result.    Please inform patient that her backup beta strep throat culture is negative.  If patient continues to have symptoms or other concerns persist she should follow-up with her primary care provider which is listed as Ai Arrington.

## 2023-02-27 ENCOUNTER — OFFICE VISIT (OUTPATIENT)
Dept: FAMILY MEDICINE CLINIC | Facility: CLINIC | Age: 28
End: 2023-02-27
Payer: COMMERCIAL

## 2023-02-27 ENCOUNTER — LAB (OUTPATIENT)
Dept: LAB | Facility: HOSPITAL | Age: 28
End: 2023-02-27
Payer: COMMERCIAL

## 2023-02-27 VITALS
BODY MASS INDEX: 45.99 KG/M2 | RESPIRATION RATE: 20 BRPM | DIASTOLIC BLOOD PRESSURE: 88 MMHG | HEART RATE: 107 BPM | OXYGEN SATURATION: 96 % | HEIGHT: 67 IN | TEMPERATURE: 97.1 F | SYSTOLIC BLOOD PRESSURE: 135 MMHG | WEIGHT: 293 LBS

## 2023-02-27 DIAGNOSIS — E66.01 CLASS 3 SEVERE OBESITY WITHOUT SERIOUS COMORBIDITY WITH BODY MASS INDEX (BMI) OF 50.0 TO 59.9 IN ADULT, UNSPECIFIED OBESITY TYPE: ICD-10-CM

## 2023-02-27 DIAGNOSIS — J06.9 UPPER RESPIRATORY TRACT INFECTION, UNSPECIFIED TYPE: ICD-10-CM

## 2023-02-27 DIAGNOSIS — R19.7 DIARRHEA, UNSPECIFIED TYPE: ICD-10-CM

## 2023-02-27 DIAGNOSIS — Z13.220 SCREENING, LIPID: ICD-10-CM

## 2023-02-27 DIAGNOSIS — E66.01 CLASS 3 SEVERE OBESITY WITHOUT SERIOUS COMORBIDITY WITH BODY MASS INDEX (BMI) OF 50.0 TO 59.9 IN ADULT, UNSPECIFIED OBESITY TYPE: Primary | ICD-10-CM

## 2023-02-27 LAB
BASOPHILS # BLD AUTO: 0.04 10*3/MM3 (ref 0–0.2)
BASOPHILS NFR BLD AUTO: 0.3 % (ref 0–1.5)
DEPRECATED RDW RBC AUTO: 39.1 FL (ref 37–54)
EOSINOPHIL # BLD AUTO: 0.25 10*3/MM3 (ref 0–0.4)
EOSINOPHIL NFR BLD AUTO: 1.9 % (ref 0.3–6.2)
ERYTHROCYTE [DISTWIDTH] IN BLOOD BY AUTOMATED COUNT: 12.6 % (ref 12.3–15.4)
HCT VFR BLD AUTO: 45.8 % (ref 34–46.6)
HGB BLD-MCNC: 15.1 G/DL (ref 12–15.9)
IMM GRANULOCYTES # BLD AUTO: 0.13 10*3/MM3 (ref 0–0.05)
IMM GRANULOCYTES NFR BLD AUTO: 1 % (ref 0–0.5)
LYMPHOCYTES # BLD AUTO: 2.9 10*3/MM3 (ref 0.7–3.1)
LYMPHOCYTES NFR BLD AUTO: 22.4 % (ref 19.6–45.3)
MCH RBC QN AUTO: 28.3 PG (ref 26.6–33)
MCHC RBC AUTO-ENTMCNC: 33 G/DL (ref 31.5–35.7)
MCV RBC AUTO: 85.9 FL (ref 79–97)
MONOCYTES # BLD AUTO: 0.55 10*3/MM3 (ref 0.1–0.9)
MONOCYTES NFR BLD AUTO: 4.3 % (ref 5–12)
NEUTROPHILS NFR BLD AUTO: 70.1 % (ref 42.7–76)
NEUTROPHILS NFR BLD AUTO: 9.06 10*3/MM3 (ref 1.7–7)
NRBC BLD AUTO-RTO: 0 /100 WBC (ref 0–0.2)
PLATELET # BLD AUTO: 400 10*3/MM3 (ref 140–450)
PMV BLD AUTO: 9.1 FL (ref 6–12)
RBC # BLD AUTO: 5.33 10*6/MM3 (ref 3.77–5.28)
WBC NRBC COR # BLD: 12.93 10*3/MM3 (ref 3.4–10.8)

## 2023-02-27 PROCEDURE — 80053 COMPREHEN METABOLIC PANEL: CPT

## 2023-02-27 PROCEDURE — 99214 OFFICE O/P EST MOD 30 MIN: CPT

## 2023-02-27 PROCEDURE — 85025 COMPLETE CBC W/AUTO DIFF WBC: CPT

## 2023-02-27 PROCEDURE — 80061 LIPID PANEL: CPT

## 2023-02-27 PROCEDURE — 36415 COLL VENOUS BLD VENIPUNCTURE: CPT

## 2023-02-27 RX ORDER — CEFDINIR 300 MG/1
300 CAPSULE ORAL 2 TIMES DAILY
Qty: 14 CAPSULE | Refills: 0 | Status: SHIPPED | OUTPATIENT
Start: 2023-02-27 | End: 2023-03-06

## 2023-02-27 RX ORDER — AMITRIPTYLINE HYDROCHLORIDE 25 MG/1
25 TABLET, FILM COATED ORAL NIGHTLY
Qty: 30 TABLET | Refills: 2 | Status: SHIPPED | OUTPATIENT
Start: 2023-02-27

## 2023-02-27 NOTE — PROGRESS NOTES
Chief Complaint   Patient presents with   • Cough     Went to Urgent Care on the 19th due to cough say's she has a sinus and bacterial infection. Taking Amoxicillin but makes her feel worse.    • Vomiting     Coughing spells making her vomit.    • Follow-up     Hasn't been able to start the Amitriptyline and hasn't been able to get wrist brace would like for it to be sent to [facility].        Subjective          Jessie Schultz presents to [facility]    History of Present Illness  She is here to be seen for cough. She was seen at urgent care and was started on antibiotics. She states she feels like the amoxicillin is not helping. She has coughing spells that make her vomit. She also needs me to resend in the wrist braces and the amitriptyline that she was prescribed previously. I am going to switch her from amoxicillin to omnicef.   Cough    Vomiting   Associated symptoms include coughing.       Past History:  Medical History: has a past medical history of Allergic, Allergies, and Prediabetes.   Surgical History: has a past surgical history that includes Carpal tunnel release; Adenoidectomy; Tympanostomy tube placement; and Tonsillectomy.   Family History: family history includes Diabetes in her father, maternal grandmother, mother, and paternal grandfather; Hypertension in her father and mother.   Social History: reports that she has never smoked. She has never been exposed to tobacco smoke. She has never used smokeless tobacco. She reports that she does not currently use alcohol. She reports that she does not use drugs.  Allergies: Sulfa antibiotics, Sulfamethoxazole, Sulfamethoxazole-trimethoprim, Sulfasalazine, Sulfonylureas, and Sulfacetamide sodium  (Not in a hospital admission)       Social History     Socioeconomic History   • Marital status: Single   Tobacco Use   • Smoking status: Never     Passive exposure: Never   • Smokeless tobacco: Never   Vaping Use   • Vaping Use:  "Never used   Substance and Sexual Activity   • Alcohol use: Not Currently   • Drug use: Never   • Sexual activity: Defer       There are no preventive care reminders to display for this patient.    Objective     Vital Signs:   /88 (BP Location: Left arm, Patient Position: Sitting)   Pulse 107   Temp 97.1 °F (36.2 °C) (Infrared)   Resp 20   Ht 170.2 cm (67\")   Wt (!) 153 kg (336 lb 9.6 oz)   SpO2 96%   BMI 52.72 kg/m²       Physical Exam  Constitutional:       Appearance: Normal appearance.   HENT:      Nose: Nose normal.      Mouth/Throat:      Mouth: Mucous membranes are moist.   Cardiovascular:      Rate and Rhythm: Normal rate and regular rhythm.   Pulmonary:      Effort: Pulmonary effort is normal.      Breath sounds: Normal breath sounds.   Skin:     General: Skin is warm and dry.   Neurological:      General: No focal deficit present.      Mental Status: She is alert and oriented to person, place, and time.   Psychiatric:         Mood and Affect: Mood normal.         Behavior: Behavior normal.          Review of Systems   Respiratory: Positive for cough.    Gastrointestinal: Positive for vomiting.        Result Review :                 Assessment and Plan    Diagnoses and all orders for this visit:    1. Class 3 severe obesity without serious comorbidity with body mass index (BMI) of 50.0 to 59.9 in adult, unspecified obesity type (HCC) (Primary)  -     CBC w AUTO Differential; Future  -     Comprehensive metabolic panel; Future    2. Upper respiratory tract infection, unspecified type  -     cefdinir (OMNICEF) 300 MG capsule; Take 1 capsule by mouth 2 (Two) Times a Day for 7 days.  Dispense: 14 capsule; Refill: 0    3. Diarrhea, unspecified type  Comments:  Since starting augmentin. Will change from augmentin to omnicef today    4. Screening, lipid  -     Lipid panel; Future    Other orders  -     Elastic Bandages & Supports (Wrist Brace//Left Large) misc; 1 each Daily.  Dispense: 1 each; Refill: " 0  -     amitriptyline (ELAVIL) 25 MG tablet; Take 1 tablet by mouth Every Night.  Dispense: 30 tablet; Refill: 2      I spent 38 minutes caring for Jessie on this date of service. This time includes time spent by me in the following activities:preparing for the visit, reviewing tests, obtaining and/or reviewing a separately obtained history, performing a medically appropriate examination and/or evaluation , counseling and educating the patient/family/caregiver, ordering medications, tests, or procedures and documenting information in the medical record    Pt thought to be clinically stable at this time.    Follow Up   Return if symptoms worsen or fail to improve.  Patient was given instructions and counseling regarding her condition or for health maintenance advice. Please see specific information pulled into the AVS if appropriate.

## 2023-02-28 LAB
ALBUMIN SERPL-MCNC: 4.4 G/DL (ref 3.5–5.2)
ALBUMIN/GLOB SERPL: 1.3 G/DL
ALP SERPL-CCNC: 87 U/L (ref 39–117)
ALT SERPL W P-5'-P-CCNC: 22 U/L (ref 1–33)
ANION GAP SERPL CALCULATED.3IONS-SCNC: 14.9 MMOL/L (ref 5–15)
AST SERPL-CCNC: 19 U/L (ref 1–32)
BILIRUB SERPL-MCNC: 0.2 MG/DL (ref 0–1.2)
BUN SERPL-MCNC: 9 MG/DL (ref 6–20)
BUN/CREAT SERPL: 15.8 (ref 7–25)
CALCIUM SPEC-SCNC: 9.8 MG/DL (ref 8.6–10.5)
CHLORIDE SERPL-SCNC: 101 MMOL/L (ref 98–107)
CHOLEST SERPL-MCNC: 269 MG/DL (ref 0–200)
CO2 SERPL-SCNC: 22.1 MMOL/L (ref 22–29)
CREAT SERPL-MCNC: 0.57 MG/DL (ref 0.57–1)
EGFRCR SERPLBLD CKD-EPI 2021: 127.9 ML/MIN/1.73
GLOBULIN UR ELPH-MCNC: 3.5 GM/DL
GLUCOSE SERPL-MCNC: 114 MG/DL (ref 65–99)
HDLC SERPL-MCNC: 41 MG/DL (ref 40–60)
LDLC SERPL CALC-MCNC: 198 MG/DL (ref 0–100)
LDLC/HDLC SERPL: 4.78 {RATIO}
POTASSIUM SERPL-SCNC: 4.4 MMOL/L (ref 3.5–5.2)
PROT SERPL-MCNC: 7.9 G/DL (ref 6–8.5)
SODIUM SERPL-SCNC: 138 MMOL/L (ref 136–145)
TRIGL SERPL-MCNC: 160 MG/DL (ref 0–150)
VLDLC SERPL-MCNC: 30 MG/DL (ref 5–40)

## 2023-03-01 DIAGNOSIS — E78.00 HYPERCHOLESTEROLEMIA: Primary | ICD-10-CM

## 2023-03-01 RX ORDER — ATORVASTATIN CALCIUM 10 MG/1
10 TABLET, FILM COATED ORAL DAILY
Qty: 30 TABLET | Refills: 2 | Status: SHIPPED | OUTPATIENT
Start: 2023-03-01 | End: 2023-03-31

## 2023-03-01 NOTE — PROGRESS NOTES
Cholesterol is still very high. Need to start on atorvastatin for that. I have sent that in to the pharmacy for her.

## 2023-03-15 ENCOUNTER — OFFICE VISIT (OUTPATIENT)
Dept: FAMILY MEDICINE CLINIC | Facility: CLINIC | Age: 28
End: 2023-03-15
Payer: COMMERCIAL

## 2023-03-15 VITALS
BODY MASS INDEX: 45.99 KG/M2 | HEIGHT: 67 IN | WEIGHT: 293 LBS | SYSTOLIC BLOOD PRESSURE: 131 MMHG | TEMPERATURE: 97.7 F | DIASTOLIC BLOOD PRESSURE: 83 MMHG | HEART RATE: 83 BPM | OXYGEN SATURATION: 100 % | RESPIRATION RATE: 18 BRPM

## 2023-03-15 DIAGNOSIS — R20.0 NUMBNESS AND TINGLING IN RIGHT HAND: ICD-10-CM

## 2023-03-15 DIAGNOSIS — R20.2 NUMBNESS AND TINGLING IN RIGHT HAND: ICD-10-CM

## 2023-03-15 DIAGNOSIS — R20.2 NUMBNESS AND TINGLING IN LEFT HAND: Primary | ICD-10-CM

## 2023-03-15 DIAGNOSIS — R20.0 NUMBNESS AND TINGLING IN LEFT HAND: Primary | ICD-10-CM

## 2023-03-15 DIAGNOSIS — E66.01 CLASS 3 SEVERE OBESITY WITHOUT SERIOUS COMORBIDITY WITH BODY MASS INDEX (BMI) OF 50.0 TO 59.9 IN ADULT, UNSPECIFIED OBESITY TYPE: Chronic | ICD-10-CM

## 2023-03-15 PROCEDURE — 1159F MED LIST DOCD IN RCRD: CPT

## 2023-03-15 PROCEDURE — 1160F RVW MEDS BY RX/DR IN RCRD: CPT

## 2023-03-15 PROCEDURE — 99214 OFFICE O/P EST MOD 30 MIN: CPT

## 2023-03-15 RX ORDER — PHENTERMINE HYDROCHLORIDE 37.5 MG/1
37.5 CAPSULE ORAL EVERY MORNING
Qty: 30 CAPSULE | Refills: 0 | Status: SHIPPED | OUTPATIENT
Start: 2023-03-15 | End: 2023-04-14

## 2023-03-15 NOTE — PROGRESS NOTES
"Chief Complaint   Patient presents with   • Follow-up     Following up from starting the Atorvastatin and Amitriptyline, states she hasn't noticed any side effects with medications.      • Obesity     Wants to pursue weight loss options.        Subjective          Jessie Schultz presents to [facility]    History of Present Illness  She is here to follow up after starting atorvastatin and amitriptyline. She has no side effects from them and will continue taking them. She also wants to more forward with weight loss medication.       Past History:  Medical History: has a past medical history of Allergic, Allergies, and Prediabetes.   Surgical History: has a past surgical history that includes Carpal tunnel release; Adenoidectomy; Tympanostomy tube placement; and Tonsillectomy.   Family History: family history includes Diabetes in her father, maternal grandmother, mother, and paternal grandfather; Hypertension in her father and mother.   Social History: reports that she has never smoked. She has never been exposed to tobacco smoke. She has never used smokeless tobacco. She reports that she does not currently use alcohol. She reports that she does not use drugs.  Allergies: Sulfa antibiotics, Sulfamethoxazole, Sulfamethoxazole-trimethoprim, Sulfasalazine, Sulfonylureas, and Sulfacetamide sodium  (Not in a hospital admission)       Social History     Socioeconomic History   • Marital status: Single   Tobacco Use   • Smoking status: Never     Passive exposure: Never   • Smokeless tobacco: Never   Vaping Use   • Vaping Use: Never used   Substance and Sexual Activity   • Alcohol use: Not Currently   • Drug use: Never   • Sexual activity: Defer       There are no preventive care reminders to display for this patient.    Objective     Vital Signs:   /83 (BP Location: Left arm, Patient Position: Sitting)   Pulse 83   Temp 97.7 °F (36.5 °C) (Infrared)   Resp 18   Ht 170.2 cm (67\")   " Wt (!) 155 kg (341 lb 8 oz)   SpO2 100%   BMI 53.49 kg/m²       Physical Exam  Constitutional:       Appearance: Normal appearance. She is obese.   HENT:      Nose: Nose normal.      Mouth/Throat:      Mouth: Mucous membranes are moist.   Cardiovascular:      Rate and Rhythm: Normal rate and regular rhythm.      Pulses: Normal pulses.      Heart sounds: Normal heart sounds.   Pulmonary:      Effort: Pulmonary effort is normal.      Breath sounds: Normal breath sounds.   Skin:     General: Skin is warm and dry.   Neurological:      General: No focal deficit present.      Mental Status: She is alert and oriented to person, place, and time.   Psychiatric:         Mood and Affect: Mood normal.         Behavior: Behavior normal.          Review of Systems   All other systems reviewed and are negative.       Result Review :                 Assessment and Plan    Diagnoses and all orders for this visit:    1. Numbness and tingling in left hand (Primary)  -     Elastic Bandages & Supports (Wrist Brace//Left Large) misc; 2 each Daily.  Dispense: 1 each; Refill: 0    2. Numbness and tingling in right hand  -     Elastic Bandages & Supports (Wrist Brace//Left Large) misc; 2 each Daily.  Dispense: 1 each; Refill: 0    3. Class 3 severe obesity without serious comorbidity with body mass index (BMI) of 50.0 to 59.9 in adult, unspecified obesity type (HCC)  -     phentermine 37.5 MG capsule; Take 1 capsule by mouth Every Morning for 30 days.  Dispense: 30 capsule; Refill: 0          Pt thought to be clinically stable at this time.    Follow Up   Return if symptoms worsen or fail to improve.  Patient was given instructions and counseling regarding her condition or for health maintenance advice. Please see specific information pulled into the AVS if appropriate.

## 2023-03-29 DIAGNOSIS — R20.2 NUMBNESS AND TINGLING IN RIGHT HAND: ICD-10-CM

## 2023-03-29 DIAGNOSIS — R20.0 NUMBNESS AND TINGLING IN RIGHT HAND: ICD-10-CM

## 2023-03-29 DIAGNOSIS — R20.0 NUMBNESS AND TINGLING IN LEFT HAND: ICD-10-CM

## 2023-03-29 DIAGNOSIS — R20.2 NUMBNESS AND TINGLING IN LEFT HAND: ICD-10-CM

## 2023-03-29 NOTE — TELEPHONE ENCOUNTER
Reyes with [facility] called needing brace for wrist brace rx  to be for bilateral and to state carpal tunnel syndrome on it.

## 2023-04-07 ENCOUNTER — OFFICE VISIT (OUTPATIENT)
Dept: FAMILY MEDICINE CLINIC | Facility: CLINIC | Age: 28
End: 2023-04-07
Payer: COMMERCIAL

## 2023-04-07 VITALS
DIASTOLIC BLOOD PRESSURE: 74 MMHG | WEIGHT: 293 LBS | HEIGHT: 67 IN | TEMPERATURE: 98 F | HEART RATE: 81 BPM | BODY MASS INDEX: 45.99 KG/M2 | SYSTOLIC BLOOD PRESSURE: 137 MMHG | OXYGEN SATURATION: 99 % | RESPIRATION RATE: 20 BRPM

## 2023-04-07 DIAGNOSIS — H65.93 MUCOID OTITIS MEDIA OF BOTH EARS, UNSPECIFIED CHRONICITY: ICD-10-CM

## 2023-04-07 DIAGNOSIS — B00.1 FEVER BLISTER: Primary | ICD-10-CM

## 2023-04-07 RX ORDER — ATORVASTATIN CALCIUM 10 MG/1
TABLET, FILM COATED ORAL
COMMUNITY
Start: 2023-03-31

## 2023-04-07 RX ORDER — VALACYCLOVIR HYDROCHLORIDE 1 G/1
2000 TABLET, FILM COATED ORAL 2 TIMES DAILY
Qty: 4 TABLET | Refills: 2 | Status: SHIPPED | OUTPATIENT
Start: 2023-04-07 | End: 2023-04-08

## 2023-04-07 RX ORDER — AZITHROMYCIN 250 MG/1
TABLET, FILM COATED ORAL
Qty: 6 TABLET | Refills: 0 | Status: SHIPPED | OUTPATIENT
Start: 2023-04-07

## 2023-04-07 NOTE — PROGRESS NOTES
"Chief Complaint   Patient presents with   • Mouth Lesions     X1 week       Subjective          Jessie Schultz presents to [facility]    History of Present Illness  She is here to be seen for mouth lesions on her upper lip. She has also had some swollen lymph nodes. Her ears have mucoid fluid in them PADMINI. She has not been to ENT for ahwile but has had problems with her ears previously.       Past History:  Medical History: has a past medical history of Allergic, Allergies, Hyperlipidemia, and Prediabetes.   Surgical History: has a past surgical history that includes Carpal tunnel release; Adenoidectomy; Tympanostomy tube placement; and Tonsillectomy.   Family History: family history includes Diabetes in her father, maternal grandmother, mother, and paternal grandfather; Hypertension in her father and mother.   Social History: reports that she has never smoked. She has never been exposed to tobacco smoke. She has never used smokeless tobacco. She reports that she does not currently use alcohol. She reports that she does not use drugs.  Allergies: Sulfa antibiotics, Sulfamethoxazole, Sulfamethoxazole-trimethoprim, Sulfasalazine, Sulfonylureas, and Sulfacetamide sodium  (Not in a hospital admission)       Social History     Socioeconomic History   • Marital status: Single   Tobacco Use   • Smoking status: Never     Passive exposure: Never   • Smokeless tobacco: Never   Vaping Use   • Vaping Use: Never used   Substance and Sexual Activity   • Alcohol use: Not Currently   • Drug use: Never   • Sexual activity: Defer       There are no preventive care reminders to display for this patient.    Objective     Vital Signs:   /74 (BP Location: Right arm, Patient Position: Sitting, Cuff Size: Large Adult)   Pulse 81   Temp 98 °F (36.7 °C) (Temporal)   Resp 20   Ht 170.2 cm (67\")   Wt (!) 150 kg (330 lb)   SpO2 99%   BMI 51.69 kg/m²       Physical Exam  Constitutional:       " Appearance: Normal appearance.   HENT:      Nose: Nose normal.      Mouth/Throat:      Mouth: Mucous membranes are moist.   Cardiovascular:      Rate and Rhythm: Normal rate and regular rhythm.      Pulses: Normal pulses.      Heart sounds: Normal heart sounds.   Pulmonary:      Effort: Pulmonary effort is normal.      Breath sounds: Normal breath sounds.   Skin:     General: Skin is warm and dry.   Neurological:      General: No focal deficit present.      Mental Status: She is alert and oriented to person, place, and time.   Psychiatric:         Mood and Affect: Mood normal.         Behavior: Behavior normal.          Review of Systems   All other systems reviewed and are negative.       Result Review :                 Assessment and Plan    Diagnoses and all orders for this visit:    1. Fever blister (Primary)  -     valACYclovir (Valtrex) 1000 MG tablet; Take 2 tablets by mouth 2 (Two) Times a Day for 1 day.  Dispense: 4 tablet; Refill: 2    2. Mucoid otitis media of both ears, unspecified chronicity  -     Ambulatory Referral to ENT (Otolaryngology)    Other orders  -     azithromycin (Zithromax Z-Broderick) 250 MG tablet; Take 2 tablets by mouth on day 1, then 1 tablet daily on days 2-5  Dispense: 6 tablet; Refill: 0      I spent 30 minutes caring for Jessie on this date of service. This time includes time spent by me in the following activities:preparing for the visit, reviewing tests, obtaining and/or reviewing a separately obtained history, performing a medically appropriate examination and/or evaluation , counseling and educating the patient/family/caregiver, ordering medications, tests, or procedures and documenting information in the medical record    Pt thought to be clinically stable at this time.    Follow Up   No follow-ups on file.  Patient was given instructions and counseling regarding her condition or for health maintenance advice. Please see specific information pulled into the AVS if appropriate.

## 2023-05-01 DIAGNOSIS — E66.01 CLASS 3 SEVERE OBESITY WITHOUT SERIOUS COMORBIDITY WITH BODY MASS INDEX (BMI) OF 50.0 TO 59.9 IN ADULT, UNSPECIFIED OBESITY TYPE: Chronic | ICD-10-CM

## 2023-05-03 RX ORDER — PHENTERMINE HYDROCHLORIDE 37.5 MG/1
CAPSULE ORAL
Qty: 30 CAPSULE | Refills: 0 | Status: SHIPPED | OUTPATIENT
Start: 2023-05-03

## 2023-05-09 ENCOUNTER — PROCEDURE VISIT (OUTPATIENT)
Dept: NEUROLOGY | Facility: CLINIC | Age: 28
End: 2023-05-09
Payer: COMMERCIAL

## 2023-05-09 VITALS
SYSTOLIC BLOOD PRESSURE: 126 MMHG | HEIGHT: 67 IN | DIASTOLIC BLOOD PRESSURE: 78 MMHG | BODY MASS INDEX: 45.99 KG/M2 | HEART RATE: 100 BPM | WEIGHT: 293 LBS

## 2023-05-09 DIAGNOSIS — M25.532 BILATERAL WRIST PAIN: ICD-10-CM

## 2023-05-09 DIAGNOSIS — M25.531 BILATERAL WRIST PAIN: ICD-10-CM

## 2023-05-09 DIAGNOSIS — Z98.890 HISTORY OF CARPAL TUNNEL SURGERY: ICD-10-CM

## 2023-05-09 DIAGNOSIS — G56.03 BILATERAL CARPAL TUNNEL SYNDROME: Primary | ICD-10-CM

## 2023-05-09 NOTE — PROGRESS NOTES
"Chief Complaint  Numbness (NUMBNESS & TINGLING IN BUE. )    Subjective          Jessie Schultz is a 27 y.o. female who presents to [facility]  History of Present Illness  27-year-old woman evaluated for bilateral hand numbness and tingling.  She states that it happens every other day.  It happens during the daytime when she is doing activities of daily living.  It happens at night as well.  She states that resplint is not helping her.  She had carpal tunnel surgery to the right hand in 2016.  Left hand is bothering her as much as the right hand used to.  Objective   Vital Signs:   /78   Pulse 100   Ht 170.2 cm (67.01\")   Wt (!) 143 kg (316 lb)   BMI 49.48 kg/m²     Physical Exam   There is no weakness of the upper extremity and vision muscle testing.  Phalen sign is positive bilaterally.        Assessment and Plan  Diagnoses and all orders for this visit:    1. Bilateral carpal tunnel syndrome (Primary)  Assessment & Plan:  Nerve conduction study is abnormal and shows electrophysiologic evidence for mild bilateral tunnel syndrome.  It is worse on the left side.  I will refer her to orthopedic surgery to discuss regarding steroid injections to see if it helps her symptoms.    Orders:  -     Ambulatory Referral to Orthopedic Surgery    2. Bilateral wrist pain  -     EMG & Nerve Conduction Test    3. History of carpal tunnel surgery  -     EMG & Nerve Conduction Test       Nerve Conduction Study:  7 nerves     EMG:  None    Total time spent with the patient and coordinating patient care was 15 minutes.    Follow Up  No follow-ups on file.  Patient was given instructions and counseling regarding her condition or for health maintenance advice. Please see specific information pulled into the AVS if appropriate.       "

## 2023-05-09 NOTE — ASSESSMENT & PLAN NOTE
Nerve conduction study is abnormal and shows electrophysiologic evidence for mild bilateral tunnel syndrome.  It is worse on the left side.  I will refer her to orthopedic surgery to discuss regarding steroid injections to see if it helps her symptoms.

## 2023-05-19 ENCOUNTER — OFFICE VISIT (OUTPATIENT)
Dept: ORTHOPEDIC SURGERY | Facility: CLINIC | Age: 28
End: 2023-05-19
Payer: COMMERCIAL

## 2023-05-19 ENCOUNTER — PREP FOR SURGERY (OUTPATIENT)
Dept: OTHER | Facility: HOSPITAL | Age: 28
End: 2023-05-19
Payer: COMMERCIAL

## 2023-05-19 VITALS — BODY MASS INDEX: 45.99 KG/M2 | WEIGHT: 293 LBS | HEIGHT: 67 IN

## 2023-05-19 DIAGNOSIS — G56.03 CARPAL TUNNEL SYNDROME, BILATERAL: Primary | ICD-10-CM

## 2023-05-19 RX ORDER — MELOXICAM 15 MG/1
15 TABLET ORAL DAILY
Qty: 30 TABLET | Refills: 1 | Status: SHIPPED | OUTPATIENT
Start: 2023-05-19

## 2023-05-19 RX ORDER — CEFAZOLIN SODIUM 2 G/100ML
2 INJECTION, SOLUTION INTRAVENOUS ONCE
OUTPATIENT
Start: 2023-05-19 | End: 2023-05-19

## 2023-05-19 RX ORDER — CEFAZOLIN SODIUM IN 0.9 % NACL 3 G/100 ML
3 INTRAVENOUS SOLUTION, PIGGYBACK (ML) INTRAVENOUS ONCE
OUTPATIENT
Start: 2023-05-19 | End: 2023-05-19

## 2023-05-19 NOTE — PROGRESS NOTES
"Chief Complaint  Initial Evaluation of the Right Hand and Initial Evaluation of the Left Hand     Subjective      Jessie Schultz presents to [facility] for initial evaluation of bilateral hands. She had carpal tunnel surgery on the right in 2016.  She is wearing bilateral braces for support and stability.  She now is having increase pain in the left hand. She still has pain in the right.  She works with a job with a lot of pushing and pulling and  strength.  She has been bracing for about 9 months.  She has pain and numbness with sleeping and driving and throughout the day.  She had a EMG. She has difficulty with movement of the thumb of the right hand.      Allergies   Allergen Reactions   • Sulfa Antibiotics Hives   • Sulfamethoxazole Hives   • Sulfamethoxazole-Trimethoprim Hives   • Sulfasalazine Hives   • Sulfonylureas Hives   • Sulfacetamide Sodium Unknown - Low Severity        Social History     Socioeconomic History   • Marital status: Single   Tobacco Use   • Smoking status: Never     Passive exposure: Never   • Smokeless tobacco: Never   Vaping Use   • Vaping Use: Never used   Substance and Sexual Activity   • Alcohol use: Not Currently   • Drug use: Never   • Sexual activity: Defer        Review of Systems     Objective   Vital Signs:   Ht 170.2 cm (67\")   Wt (!) 143 kg (316 lb)   BMI 49.49 kg/m²       Physical Exam  Constitutional:       Appearance: Normal appearance. Patient is well-developed and normal weight.   HENT:      Head: Normocephalic.      Right Ear: Hearing and external ear normal.      Left Ear: Hearing and external ear normal.      Nose: Nose normal.   Eyes:      Conjunctiva/sclera: Conjunctivae normal.   Cardiovascular:      Rate and Rhythm: Normal rate.   Pulmonary:      Effort: Pulmonary effort is normal.      Breath sounds: No wheezing or rales.   Abdominal:      Palpations: Abdomen is soft.      Tenderness: There is no abdominal tenderness. "   Musculoskeletal:      Cervical back: Normal range of motion.   Skin:     Findings: No rash.   Neurological:      Mental Status: Patient is alert and oriented to person, place, and time.   Psychiatric:         Mood and Affect: Mood and affect normal.         Judgment: Judgment normal.       Ortho Exam      BILATERAL HANDS Negative Compression testing/ Positive Tinels. NegativeFinkelsteins. Negative Marte's testing. Negative CMC grind testing. Positive Phalens. Full ROM of the hand, fingers, elbow and wrist. Negative Triggering of the digit. Sensation grossly intact to light touch, median, radial and ulnar nerve. Positive AIN, PIN and ulnar nerve motor function intact. Axillary nerve intact. Positive pulses.     Procedures      Imaging Results (Most Recent)     None           Result Review :     EMG 5/9/23  Nerve conduction study is abnormal and shows electrophysiologic evidence for mild bilateral tunnel syndrome.  It is worse on the left side.        Assessment and Plan     Diagnoses and all orders for this visit:    1. Carpal tunnel syndrome, bilateral (Primary)        Discussed the treatment plan with the patient. I reviewed the EMG with the patient.      Discussed the treatment options with the patient, operative vs non-operative. The patient expressed understanding and wished to proceed with a left hand carpal tunnel release.       HEP exercises.      Prescribed anti inflammatories.       Discussed surgery., Risks/benefits discussed with patient including, but not limited to: infection, bleeding, neurovascular damage, malunion, nonunion, aesthetic deformity, need for further surgery, and death., Surgery pamphlet given. and Call or return if worsening symptoms.    Follow Up     2 weeks postoperatively       Patient was given instructions and counseling regarding her condition or for health maintenance advice. Please see specific information pulled into the AVS if appropriate.     Scribed for Bill Jones MD  by Akosua Schmitt MA.  05/19/23   10:01 EDT    I have personally performed the services described in this document as scribed by the above individual and it is both accurate and complete. Bill Jones MD 05/19/23

## 2023-05-25 DIAGNOSIS — E66.01 CLASS 3 SEVERE OBESITY WITHOUT SERIOUS COMORBIDITY WITH BODY MASS INDEX (BMI) OF 50.0 TO 59.9 IN ADULT, UNSPECIFIED OBESITY TYPE: Chronic | ICD-10-CM

## 2023-05-26 RX ORDER — PHENTERMINE HYDROCHLORIDE 37.5 MG/1
CAPSULE ORAL
Qty: 30 CAPSULE | Refills: 0 | Status: SHIPPED | OUTPATIENT
Start: 2023-05-26

## 2023-06-01 RX ORDER — AMITRIPTYLINE HYDROCHLORIDE 25 MG/1
25 TABLET, FILM COATED ORAL NIGHTLY
Qty: 30 TABLET | Refills: 2 | Status: SHIPPED | OUTPATIENT
Start: 2023-06-01

## 2023-06-01 RX ORDER — ATORVASTATIN CALCIUM 10 MG/1
TABLET, FILM COATED ORAL
Qty: 30 TABLET | Refills: 2 | Status: SHIPPED | OUTPATIENT
Start: 2023-06-01

## 2023-07-18 NOTE — PRE-PROCEDURE INSTRUCTIONS
IMPORTANT INSTRUCTIONS - PRE-ADMISSION TESTING  DO NOT EAT OR CHEW anything after midnight the night before your procedure.    You may have CLEAR liquids up to 2 hours prior to ARRIVAL time.   Take the following medications the morning of your procedure with JUST A SIP OF WATER: ZYRTEC, FLONASE    DO NOT BRING your medications to the hospital with you, UNLESS something has changed since your PRE-Admission Testing appointment.  Hold all vitamins, supplements, and NSAIDS (Non- steroidal anti-inflammatory meds) for one week prior to surgery (you MAY take Tylenol or Acetaminophen).  If you are diabetic, check your blood sugar the morning of your procedure. If it is less than 70 or if you are feeling symptomatic, call the following number for further instructions: [phone].  Use your inhalers/nebulizers as usual, the morning of your procedure. BRING YOUR INHALERS with you.   Bring your CPAP or BIPAP to hospital, ONLY IF YOU WILL BE SPENDING THE NIGHT.   Make sure you have a ride home and have someone who will stay with you the day of your procedure after you go home.  If you have any questions, please call your Pre-Admission Testing NursePOOJA at [phone]_.   Per anesthesia request, do not smoke for 24 hours before your procedure or as instructed by your surgeon.  Clear Liquid Diet        Find out when you need to start a clear liquid diet.   Think of “clear liquids” as anything you could read a newspaper through. This includes things like water, broth, sports drinks, or tea WITHOUT any kind of milk or cream.           Once you are told to start a clear liquid diet, only drink these things until 2 hours before arrival to the hospital or when the hospital says to stop. Total volume limitation: 8 oz.       Clear liquids you CAN drink:   Water   Clear broth: beef, chicken, vegetable, or bone broth with nothing in it   Gatorade   Lemonade or Reginaldo-aid   Soda   Tea, coffee (NO cream or honey)   Jell-O (without  fruit)   Popsicles (without fruit or cream)   Italian ices   Juice without pulp: apple, white, grape   You may use salt, pepper, and sugar    Do NOT drink:   Milk or cream   Soy milk, almond milk, coconut milk, or other non-dairy drinks and   creamers   Milkshakes or smoothies   Tomato juice   Orange juice   Grapefruit juice   Cream soups or any other than broth         Clear Liquid Diet:  Do NOT eat any solid food.  Do NOT eat or suck on mints or candy.  Do NOT chew gum.  Do NOT drink thick liquids like milk or juice with pulp in it.  Do NOT add milk, cream, or anything like soy milk or almond milk to coffee or tea.

## 2023-07-19 ENCOUNTER — HOSPITAL ENCOUNTER (OUTPATIENT)
Facility: HOSPITAL | Age: 28
Setting detail: HOSPITAL OUTPATIENT SURGERY
Discharge: HOME | End: 2023-07-19
Attending: ORTHOPAEDIC SURGERY | Admitting: ORTHOPAEDIC SURGERY
Payer: COMMERCIAL

## 2023-07-19 VITALS
OXYGEN SATURATION: 97 % | WEIGHT: 270.95 LBS | RESPIRATION RATE: 18 BRPM | BODY MASS INDEX: 41.06 KG/M2 | SYSTOLIC BLOOD PRESSURE: 136 MMHG | DIASTOLIC BLOOD PRESSURE: 85 MMHG | HEART RATE: 71 BPM | TEMPERATURE: 96.8 F | HEIGHT: 68 IN

## 2023-07-19 DIAGNOSIS — G56.03 CARPAL TUNNEL SYNDROME, BILATERAL: ICD-10-CM

## 2023-07-19 LAB — HCG SERPL QL: NEGATIVE

## 2023-07-19 PROCEDURE — 0 BUPIVACAINE (PF) 0.5 % SOLUTION: Performed by: ORTHOPAEDIC SURGERY

## 2023-07-19 PROCEDURE — 84703 CHORIONIC GONADOTROPIN ASSAY: CPT | Performed by: ANESTHESIOLOGY

## 2023-07-19 PROCEDURE — 25010000002 CEFAZOLIN PER 500 MG: Performed by: ORTHOPAEDIC SURGERY

## 2023-07-19 PROCEDURE — 64721 CARPAL TUNNEL SURGERY: CPT | Performed by: ORTHOPAEDIC SURGERY

## 2023-07-19 PROCEDURE — 25010000002 METOCLOPRAMIDE PER 10 MG: Performed by: ANESTHESIOLOGY

## 2023-07-19 PROCEDURE — 25010000002 MIDAZOLAM PER 1MG: Performed by: ANESTHESIOLOGY

## 2023-07-19 RX ORDER — MAGNESIUM HYDROXIDE 1200 MG/15ML
LIQUID ORAL AS NEEDED
Status: DISCONTINUED | OUTPATIENT
Start: 2023-07-19 | End: 2023-07-19 | Stop reason: HOSPADM

## 2023-07-19 RX ORDER — HYDROCODONE BITARTRATE AND ACETAMINOPHEN 7.5; 325 MG/1; MG/1
1 TABLET ORAL ONCE AS NEEDED
Status: DISCONTINUED | OUTPATIENT
Start: 2023-07-19 | End: 2023-07-19 | Stop reason: HOSPADM

## 2023-07-19 RX ORDER — ONDANSETRON 2 MG/ML
4 INJECTION INTRAMUSCULAR; INTRAVENOUS ONCE AS NEEDED
Status: DISCONTINUED | OUTPATIENT
Start: 2023-07-19 | End: 2023-07-19 | Stop reason: HOSPADM

## 2023-07-19 RX ORDER — PROMETHAZINE HYDROCHLORIDE 25 MG/1
25 SUPPOSITORY RECTAL ONCE AS NEEDED
Status: DISCONTINUED | OUTPATIENT
Start: 2023-07-19 | End: 2023-07-19 | Stop reason: HOSPADM

## 2023-07-19 RX ORDER — SODIUM CHLORIDE, SODIUM LACTATE, POTASSIUM CHLORIDE, CALCIUM CHLORIDE 600; 310; 30; 20 MG/100ML; MG/100ML; MG/100ML; MG/100ML
9 INJECTION, SOLUTION INTRAVENOUS CONTINUOUS PRN
Status: DISCONTINUED | OUTPATIENT
Start: 2023-07-19 | End: 2023-07-19 | Stop reason: HOSPADM

## 2023-07-19 RX ORDER — BUPIVACAINE HYDROCHLORIDE 5 MG/ML
INJECTION, SOLUTION EPIDURAL; INTRACAUDAL AS NEEDED
Status: DISCONTINUED | OUTPATIENT
Start: 2023-07-19 | End: 2023-07-19 | Stop reason: HOSPADM

## 2023-07-19 RX ORDER — PROMETHAZINE HYDROCHLORIDE 12.5 MG/1
25 TABLET ORAL ONCE AS NEEDED
Status: DISCONTINUED | OUTPATIENT
Start: 2023-07-19 | End: 2023-07-19 | Stop reason: HOSPADM

## 2023-07-19 RX ORDER — ACETAMINOPHEN 500 MG
1000 TABLET ORAL ONCE
Status: COMPLETED | OUTPATIENT
Start: 2023-07-19 | End: 2023-07-19

## 2023-07-19 RX ORDER — CEFAZOLIN SODIUM 2 G/100ML
2 INJECTION, SOLUTION INTRAVENOUS ONCE
Status: DISCONTINUED | OUTPATIENT
Start: 2023-07-19 | End: 2023-07-19

## 2023-07-19 RX ORDER — METOCLOPRAMIDE HYDROCHLORIDE 5 MG/ML
10 INJECTION INTRAMUSCULAR; INTRAVENOUS ONCE AS NEEDED
Status: COMPLETED | OUTPATIENT
Start: 2023-07-19 | End: 2023-07-19

## 2023-07-19 RX ORDER — MEPERIDINE HYDROCHLORIDE 25 MG/ML
12.5 INJECTION INTRAMUSCULAR; INTRAVENOUS; SUBCUTANEOUS
Status: DISCONTINUED | OUTPATIENT
Start: 2023-07-19 | End: 2023-07-19 | Stop reason: HOSPADM

## 2023-07-19 RX ORDER — CEFAZOLIN SODIUM IN 0.9 % NACL 3 G/100 ML
3 INTRAVENOUS SOLUTION, PIGGYBACK (ML) INTRAVENOUS ONCE
Status: COMPLETED | OUTPATIENT
Start: 2023-07-19 | End: 2023-07-19

## 2023-07-19 RX ORDER — HYDROMORPHONE HYDROCHLORIDE 2 MG/1
2 TABLET ORAL
Status: DISCONTINUED | OUTPATIENT
Start: 2023-07-19 | End: 2023-07-19 | Stop reason: HOSPADM

## 2023-07-19 RX ORDER — HYDROCODONE BITARTRATE AND ACETAMINOPHEN 7.5; 325 MG/1; MG/1
1 TABLET ORAL EVERY 4 HOURS PRN
Qty: 21 TABLET | Refills: 0 | Status: SHIPPED | OUTPATIENT
Start: 2023-07-19

## 2023-07-19 RX ORDER — MIDAZOLAM HYDROCHLORIDE 2 MG/2ML
2 INJECTION, SOLUTION INTRAMUSCULAR; INTRAVENOUS ONCE
Status: COMPLETED | OUTPATIENT
Start: 2023-07-19 | End: 2023-07-19

## 2023-07-19 RX ADMIN — METOCLOPRAMIDE HYDROCHLORIDE 10 MG: 5 INJECTION INTRAMUSCULAR; INTRAVENOUS at 07:03

## 2023-07-19 RX ADMIN — MIDAZOLAM HYDROCHLORIDE 2 MG: 1 INJECTION, SOLUTION INTRAMUSCULAR; INTRAVENOUS at 07:03

## 2023-07-19 RX ADMIN — SODIUM CHLORIDE, POTASSIUM CHLORIDE, SODIUM LACTATE AND CALCIUM CHLORIDE 9 ML/HR: 600; 310; 30; 20 INJECTION, SOLUTION INTRAVENOUS at 06:51

## 2023-07-19 RX ADMIN — ACETAMINOPHEN 1000 MG: 500 TABLET ORAL at 06:51

## 2023-07-19 NOTE — OP NOTE
CARPAL TUNNEL RELEASE  Procedure Report    Patient Name:  Jessie Schultz  YOB: 1995    Date of Surgery:  7/19/2023     Indications:  +CTS    Pre-op Diagnosis:   Carpal tunnel syndrome, bilateral [G56.03]       Post-Op Diagnosis Codes:     * Carpal tunnel syndrome, bilateral [G56.03]    Procedure/CPT® Codes:      Procedure(s):  CARPAL TUNNEL RELEASE LEFT    Staff:  Surgeon(s):  Bill Jones MD    Assistant: Aimee Ann    Anesthesia: General    Estimated Blood Loss: none    Implants:    Nothing was implanted during the procedure    Specimen:          None        Findings: Carpal Tunnel Syndrome     Complications: None    Description of Procedure: The patient was brought to the operating room and underwent anesthesia without complication.  The patient received preoperative antibiotic and was then prepped and draped in sterile fashion.  Incision was made directly over the transverse carpal ligament and dissected down.  The ligament was identified and then opened using the #15 blade and then completed using tenotomy scissors.  Care was taken to avoid any neurovascular or tendinous structures and then checked with a [place] and a good release was achieved.  This was then thoroughly irrigated and closed using 4-0 nylon.  Half percent plain Marcaine was injected around the surgical site.  A sterile dressing was applied, followed by application of a splint.  The tourniquet was then deflated.  The patient was taken to the recovery room in stable condition.  There were no complications.    Assistant: Aimee Ann  was responsible for performing the following activities: Retraction, Suction, Irrigation, and Placing Dressing and their skilled assistance was necessary for the success of this case.    Bill Jones MD     Date: 7/19/2023  Time: 07:47 EDT

## 2023-07-19 NOTE — H&P
"Chief Complaint  No chief complaint on file.     Subjective      Jessie Schultz presents to [facility] OR for initial evaluation of bilateral hands. She had carpal tunnel surgery on the right in 2016.  She is wearing bilateral braces for support and stability.  She now is having increase pain in the left hand. She still has pain in the right.  She works with a job with a lot of pushing and pulling and  strength.  She has been bracing for about 9 months.  She has pain and numbness with sleeping and driving and throughout the day.  She had a EMG. She has difficulty with movement of the thumb of the right hand.      Allergies   Allergen Reactions    Sulfa Antibiotics Hives    Sulfacetamide Sodium Hives    Sulfamethoxazole Hives    Sulfamethoxazole-Trimethoprim Hives    Sulfasalazine Hives    Sulfonylureas Hives        Social History     Socioeconomic History    Marital status: Single   Tobacco Use    Smoking status: Never     Passive exposure: Never    Smokeless tobacco: Never   Vaping Use    Vaping Use: Every day    Substances: Flavoring    Devices: Refillable tank   Substance and Sexual Activity    Alcohol use: Not Currently    Drug use: Never    Sexual activity: Defer        Review of Systems     Objective   Vital Signs:   /81 (BP Location: Right arm, Patient Position: Lying)   Pulse 83   Temp 98.4 °F (36.9 °C) (Temporal)   Resp 18   Ht 171.5 cm (67.5\")   Wt 123 kg (270 lb 15.1 oz)   SpO2 100%   BMI 41.81 kg/m²       Physical Exam  Constitutional:       Appearance: Normal appearance. Patient is well-developed and normal weight.   HENT:      Head: Normocephalic.      Right Ear: Hearing and external ear normal.      Left Ear: Hearing and external ear normal.      Nose: Nose normal.   Eyes:      Conjunctiva/sclera: Conjunctivae normal.   Cardiovascular:      Rate and Rhythm: Normal rate.   Pulmonary:      Effort: Pulmonary effort is normal.      Breath sounds: No wheezing or rales. "   Abdominal:      Palpations: Abdomen is soft.      Tenderness: There is no abdominal tenderness.   Musculoskeletal:      Cervical back: Normal range of motion.   Skin:     Findings: No rash.   Neurological:      Mental Status: Patient is alert and oriented to person, place, and time.   Psychiatric:         Mood and Affect: Mood and affect normal.         Judgment: Judgment normal.       Ortho Exam      BILATERAL HANDS Negative Compression testing/ Positive Tinels. NegativeFinkelsteins. Negative Marte's testing. Negative CMC grind testing. Positive Phalens. Full ROM of the hand, fingers, elbow and wrist. Negative Triggering of the digit. Sensation grossly intact to light touch, median, radial and ulnar nerve. Positive AIN, PIN and ulnar nerve motor function intact. Axillary nerve intact. Positive pulses.     Procedures      Imaging Results (Most Recent)       None             Result Review :     EMG 5/9/23  Nerve conduction study is abnormal and shows electrophysiologic evidence for mild bilateral tunnel syndrome.  It is worse on the left side.        Assessment and Plan     Diagnoses and all orders for this visit:    1. Carpal tunnel syndrome, bilateral  -     ceFAZolin in dextrose (ANCEF) IVPB solution 2 g  -     ceFAZolin in Sodium Chloride (ANCEF) IVPB solution 3 g    Other orders  -     Follow Anesthesia Guidelines / Protocol; Standing  -     Pregnancy, Urine - Urine, Clean Catch; Standing  -     POC Glucose STAT; Standing  -     Notify Anesthesia if Blood Sugar Greater Than 180; Standing  -     Vital Signs - Per Anesthesia Protocol; Standing  -     Remove Scopolamine Patch 24 Hours After Application; Standing  -     Pulse Oximetry, Continuous; Standing  -     Cancel: Pregnancy, Urine - Urine, Clean Catch; Standing  -     Insert Peripheral IV; Standing  -     lactated ringers infusion  -     acetaminophen (TYLENOL) tablet 1,000 mg  -     Midazolam HCl (PF) (VERSED) injection 2 mg  -     metoclopramide (REGLAN)  injection 10 mg  -     Remove Scopolamine Patch 24 Hours After Application  -     Pulse Oximetry, Continuous  -     Cancel: Pregnancy, Urine - Urine, Clean Catch  -     Insert Peripheral IV  -     hCG, Serum, Qualitative; Standing  -     hCG, Serum, Qualitative  -     Follow Anesthesia Guidelines / Protocol; Standing  -     Nerve Block; Standing  -     Verify NPO Status; Standing  -     SCD (Sequential Compression Device) Place on Patient in Pre-Op; Standing  -     POC Glucose Once; Standing  -     Clip Operative Site; Standing  -     Obtain Informed Consent (If Not Done Inpatient or PAT); Standing  -     Instructions on coughing, deep breathing, and incentive spirometry.; Standing  -     Follow Anesthesia Guidelines / Protocol  -     Pregnancy, Urine - Urine, Clean Catch  -     POC Glucose STAT  -     Notify Anesthesia if Blood Sugar Greater Than 180  -     Follow Anesthesia Guidelines / Protocol  -     Nerve Block  -     Verify NPO Status  -     SCD (Sequential Compression Device) Place on Patient in Pre-Op  -     POC Glucose Once  -     Clip Operative Site  -     Obtain Informed Consent (If Not Done Inpatient or PAT)  -     Instructions on coughing, deep breathing, and incentive spirometry.  -     Instructions on coughing, deep breathing, and incentive spirometry.  -     Instructions on coughing, deep breathing, and incentive spirometry.  -     Instructions on coughing, deep breathing, and incentive spirometry.  -     Instructions on coughing, deep breathing, and incentive spirometry.        Discussed the treatment plan with the patient. I reviewed the EMG with the patient.      Discussed the treatment options with the patient, operative vs non-operative. The patient expressed understanding and wished to proceed with a left hand carpal tunnel release.       HEP exercises.      Prescribed anti inflammatories.       Discussed surgery., Risks/benefits discussed with patient including, but not limited to: infection,  bleeding, neurovascular damage, malunion, nonunion, aesthetic deformity, need for further surgery, and death., Surgery pamphlet given. and Call or return if worsening symptoms.    Follow Up     2 weeks postoperatively       I have personally performed the services described in this document as scribed by the above individual and it is both accurate and complete. Bill Jones MD 07/19/23

## 2023-07-19 NOTE — DISCHARGE INSTRUCTIONS
DISCHARGE INSTRUCTIONS  CARPAL TUNNEL RELEASE      For your surgery you had:  GENERAL ANESTHESIA  You may experience dizziness, drowsiness, or lightheadedness for several hours following surgery.  Do not stay alone today or tonight.  Limit your activity for 24 hours.  Resume your diet slowly.    You should not drive or operate machinery, drink alcohol, or sign legally binding documents for 24 hours or while you are taking pain medication.  Elevate affected arm on a pillow when resting.   Use ice to affected area for 48-72 hours. Apply 20 minutes on - 20 minutes off. Do NOT apply directly to skin.  Exercise fingers frequently by making a full fist and fully straightening the fingers. This will help prevent swelling and stiffness.  Do NOT do any heavy lifting, pulling or strenuous activities using the affected hand. [x] Keep splint clean and dry.  [x] Do NOT submerge in water.  [x] Keep incision area clean and dry.    NOTIFY YOUR DOCTOR IF YOU EXPERIENCE ANY OF THE FOLLOWING:  Temperature greater than 101 degrees Fahrenheit  Shaking Chills  Redness or excessive drainage from incision  Nausea, vomiting and/or pain that is not controlled by prescribed medications  Increase in bleeding or bleeding that is excessive  Unable to urinate in 6 hours after surgery  If unable to reach your doctor, please go to the closest Emergency Room    SPECIAL INSTRUCTIONS:               I have read and received the above instructions.

## 2023-08-03 ENCOUNTER — OFFICE VISIT (OUTPATIENT)
Dept: ORTHOPEDIC SURGERY | Facility: CLINIC | Age: 28
End: 2023-08-03
Payer: COMMERCIAL

## 2023-08-03 VITALS
HEIGHT: 68 IN | SYSTOLIC BLOOD PRESSURE: 130 MMHG | WEIGHT: 270 LBS | OXYGEN SATURATION: 98 % | HEART RATE: 99 BPM | BODY MASS INDEX: 40.92 KG/M2 | DIASTOLIC BLOOD PRESSURE: 85 MMHG

## 2023-08-03 DIAGNOSIS — Z98.890 S/P CARPAL TUNNEL RELEASE: Primary | ICD-10-CM

## 2023-08-03 RX ORDER — DICLOFENAC SODIUM 75 MG/1
75 TABLET, DELAYED RELEASE ORAL 2 TIMES DAILY
Qty: 60 TABLET | Refills: 1 | Status: SHIPPED | OUTPATIENT
Start: 2023-08-03

## 2023-08-03 RX ORDER — ACETAMINOPHEN 500 MG
500 TABLET ORAL EVERY 6 HOURS PRN
COMMUNITY

## 2023-08-03 RX ORDER — MELOXICAM 15 MG/1
15 TABLET ORAL DAILY
Qty: 30 TABLET | Refills: 0 | Status: CANCELLED | OUTPATIENT
Start: 2023-08-03

## 2023-08-11 ENCOUNTER — OFFICE VISIT (OUTPATIENT)
Dept: ORTHOPEDIC SURGERY | Facility: CLINIC | Age: 28
End: 2023-08-11

## 2023-08-11 VITALS
DIASTOLIC BLOOD PRESSURE: 88 MMHG | OXYGEN SATURATION: 96 % | HEART RATE: 86 BPM | SYSTOLIC BLOOD PRESSURE: 128 MMHG | HEIGHT: 68 IN | WEIGHT: 270 LBS | BODY MASS INDEX: 40.92 KG/M2

## 2023-08-11 DIAGNOSIS — M25.521 RIGHT ELBOW PAIN: Primary | ICD-10-CM

## 2023-08-11 DIAGNOSIS — M77.8 RIGHT ELBOW TENDONITIS: ICD-10-CM

## 2023-08-11 NOTE — PROGRESS NOTES
"Chief Complaint  Initial Evaluation of the Right Elbow     Subjective      Jessie Schultz presents to [facility] for initial evaluation of the right elbow.  She has had pain for for 4-6 weeks.  She went to her PCP and had Xrays and put on a steroid pack.  She is here today for treatment intervention.  She noted the pain decreased when she was on steroid pack.  She has increase pain just with lifting. She had a left carpal tunnel release on 7/19/23    Allergies   Allergen Reactions    Sulfa Antibiotics Hives    Sulfacetamide Sodium Hives    Sulfamethoxazole Hives    Sulfamethoxazole-Trimethoprim Hives    Sulfasalazine Hives    Sulfonylureas Hives        Social History     Socioeconomic History    Marital status: Single   Tobacco Use    Smoking status: Never     Passive exposure: Never    Smokeless tobacco: Never   Vaping Use    Vaping Use: Every day    Substances: Flavoring    Devices: Refillable tank   Substance and Sexual Activity    Alcohol use: Not Currently    Drug use: Never    Sexual activity: Defer        I reviewed the patient's chief complaint, history of present illness, review of systems, past medical history, surgical history, family history, social history, medications, and allergy list.     Review of Systems     Constitutional: Denies fevers, chills, weight loss  Cardiovascular: Denies chest pain, shortness of breath  Skin: Denies rashes, acute skin changes  Neurologic: Denies headache, loss of consciousness        Vital Signs:   /88   Pulse 86   Ht 171.5 cm (67.5\")   Wt 122 kg (270 lb)   SpO2 96%   BMI 41.66 kg/mý          Physical Exam  General: Alert. No acute distress    Ortho Exam        RIGHT ELBOW Tender lateral epicondyle. Non-tender medial epicondyle. Non-tender radial head. Sensation to light touch median, radial, ulnar nerve. Positive AIN, PIN, ulnar nerve motor function. Axillary sensation intact. Elbow ROM 0-140. Negative Tinel's at the elbow. no " pain with resisted wrist and long finger extension.        Procedures      Imaging Results (Most Recent)       None             Result Review :       XR Elbow 3+ View Right    Result Date: 7/13/2023  Narrative: PROCEDURE: XR ELBOW 3+ VW RIGHT  COMPARISON: 12/9/2021.  INDICATIONS: RIGHT ELBOW PAIN WITH EXTENSION FOR 4 WEEKS. NO KNOWN INJURY.  FINDINGS:  BONES: No significant arthropathy or acute abnormality.  SOFT TISSUES: No visible soft tissue swelling.  No subcutaneous emphysema.  No retained radiopaque foreign body. EFFUSION: None visible.  OTHER: Negative.  Overall, little interval change is suggested radiographically since the prior study from 12/9/2021.  If symptoms or clinical concerns persist, consider imaging follow-up.       Impression:  No acute fracture or acute malalignment.       Please note that portions of this note were completed with a voice recognition program.  IVÁN FLOR JR, MD       Electronically Signed and Approved By: IVÁN FLOR JR, MD on 7/13/2023 at 6:49                      Assessment and Plan     Diagnoses and all orders for this visit:    1. Right elbow pain (Primary)    2. Right elbow tendonitis        Discussed the treatment plan with the patient. I reviewed the X-rays that were obtained 7/12/23 with the patient.     Continue Diclofenac.  HEP exercises.     Educated on risk of smoking. Discussed options for smoking cessation. and Call or return if worsening symptoms.    Follow Up     PRN Discuss injection if HEP exercises are not helpful.       Patient was given instructions and counseling regarding her condition or for health maintenance advice. Please see specific information pulled into the AVS if appropriate.     Scribed for Bill Jones MD by Akosua Schmitt MA.  08/11/23   08:16 EDT    I have personally performed the services described in this document as scribed by the above individual and it is both accurate and complete. Bill Jones MD 08/15/23

## 2023-08-22 ENCOUNTER — OFFICE VISIT (OUTPATIENT)
Dept: ORTHOPEDIC SURGERY | Facility: CLINIC | Age: 28
End: 2023-08-22
Payer: COMMERCIAL

## 2023-08-22 VITALS
SYSTOLIC BLOOD PRESSURE: 137 MMHG | HEIGHT: 68 IN | WEIGHT: 270 LBS | OXYGEN SATURATION: 98 % | HEART RATE: 101 BPM | DIASTOLIC BLOOD PRESSURE: 89 MMHG | BODY MASS INDEX: 40.92 KG/M2

## 2023-08-22 DIAGNOSIS — Z98.890 S/P CARPAL TUNNEL RELEASE: Primary | ICD-10-CM

## 2023-08-22 PROCEDURE — 1160F RVW MEDS BY RX/DR IN RCRD: CPT

## 2023-08-22 PROCEDURE — 99024 POSTOP FOLLOW-UP VISIT: CPT

## 2023-08-22 PROCEDURE — 1159F MED LIST DOCD IN RCRD: CPT

## 2023-08-22 NOTE — PROGRESS NOTES
"Chief Complaint  Follow-up of the Left Hand    Subjective      Jessie Schultz presents to [facility] for follow-up of left carpal tunnel release that was performed by Dr. Jones on 7/19/2023.  Patient is doing very well.  Reports that she is having some numbness on her incision but overall feels very well.  The swelling has gone down and she has good range of motion.  She is still having trouble reaching her thumb to her pinky.    Objective   Allergies   Allergen Reactions    Sulfa Antibiotics Hives    Sulfacetamide Sodium Hives    Sulfamethoxazole Hives    Sulfamethoxazole-Trimethoprim Hives    Sulfasalazine Hives    Sulfonylureas Hives       Vital Signs:   /89   Pulse 101   Ht 171.5 cm (67.5\")   Wt 122 kg (270 lb)   SpO2 98%   BMI 41.66 kg/mý       Physical Exam    Constitutional: Awake, alert. Well nourished appearance.    Integumentary: Warm, dry, intact. No obvious rashes.    HENT: Atraumatic, normocephalic.   Respiratory: Non labored respirations .   Cardiovascular: Intact peripheral pulses.    Psychiatric: Normal mood and affect. A&O X3    Ortho Exam  Left hand: Incision is completely healed and well approximated.  There is mild edema generalized around the incision.  Nontender to palpation.  She is able to make a tight fist.  Thumb to finger opposition is intact.  Capillary refill time is brisk.  Sensation is intact.  Numbness noted over incision.    Imaging Results (Most Recent)       None                      Assessment and Plan   Problem List Items Addressed This Visit    None  Visit Diagnoses       S/P carpal tunnel release    -  Primary            Follow Up   Return if symptoms worsen or fail to improve.    Patient is a smoker, please discuss smoking cessation options with your primary care provider.    Social History     Socioeconomic History    Marital status: Single   Tobacco Use    Smoking status: Never     Passive exposure: Never    Smokeless tobacco: Never "   Vaping Use    Vaping Use: Every day    Substances: Flavoring    Devices: Refillable tank   Substance and Sexual Activity    Alcohol use: Not Currently    Drug use: Never    Sexual activity: Defer       Patient Instructions   Discussed plan of care with patient.    We discussed hand therapy versus doing home exercises.  She elects to continue with home exercises.  Instructed to call if she changes her mind therapy.    Continue anti-inflammatory.    Follow-up as needed for worsening symptoms.  Call for questions or concerns.  Patient was given instructions and counseling regarding her condition or for health maintenance advice. Please see specific information pulled into the AVS if appropriate.

## 2023-08-22 NOTE — PATIENT INSTRUCTIONS
Discussed plan of care with patient.    We discussed hand therapy versus doing home exercises.  She elects to continue with home exercises.  Instructed to call if she changes her mind therapy.    Continue anti-inflammatory.    Follow-up as needed for worsening symptoms.  Call for questions or concerns.

## 2023-09-07 RX ORDER — ATORVASTATIN CALCIUM 10 MG/1
TABLET, FILM COATED ORAL
Qty: 30 TABLET | Refills: 2 | Status: SHIPPED | OUTPATIENT
Start: 2023-09-07

## 2023-09-07 RX ORDER — AMITRIPTYLINE HYDROCHLORIDE 25 MG/1
25 TABLET, FILM COATED ORAL NIGHTLY
Qty: 30 TABLET | Refills: 2 | Status: SHIPPED | OUTPATIENT
Start: 2023-09-07

## 2023-09-08 ENCOUNTER — CLINICAL SUPPORT (OUTPATIENT)
Dept: FAMILY MEDICINE CLINIC | Facility: CLINIC | Age: 28
End: 2023-09-08

## 2023-09-08 DIAGNOSIS — E66.01 CLASS 3 SEVERE OBESITY WITHOUT SERIOUS COMORBIDITY WITH BODY MASS INDEX (BMI) OF 50.0 TO 59.9 IN ADULT, UNSPECIFIED OBESITY TYPE: Chronic | ICD-10-CM

## 2023-09-08 DIAGNOSIS — E66.01 CLASS 3 SEVERE OBESITY WITHOUT SERIOUS COMORBIDITY WITH BODY MASS INDEX (BMI) OF 50.0 TO 59.9 IN ADULT, UNSPECIFIED OBESITY TYPE: Primary | ICD-10-CM

## 2023-09-08 RX ORDER — PHENTERMINE HYDROCHLORIDE 37.5 MG/1
CAPSULE ORAL
Qty: 30 CAPSULE | OUTPATIENT
Start: 2023-09-08

## 2023-10-02 ENCOUNTER — TRANSCRIBE ORDERS (OUTPATIENT)
Dept: PHYSICAL THERAPY | Facility: CLINIC | Age: 28
End: 2023-10-02
Payer: OTHER MISCELLANEOUS

## 2023-10-02 DIAGNOSIS — S63.502A LEFT WRIST SPRAIN, INITIAL ENCOUNTER: Primary | ICD-10-CM

## 2023-10-03 ENCOUNTER — TREATMENT (OUTPATIENT)
Dept: PHYSICAL THERAPY | Facility: CLINIC | Age: 28
End: 2023-10-03
Payer: OTHER MISCELLANEOUS

## 2023-10-03 DIAGNOSIS — M25.532 LEFT WRIST PAIN: ICD-10-CM

## 2023-10-03 DIAGNOSIS — M25.632 STIFFNESS OF LEFT WRIST JOINT: ICD-10-CM

## 2023-10-03 DIAGNOSIS — S63.502A SPRAIN OF LEFT WRIST, INITIAL ENCOUNTER: Primary | ICD-10-CM

## 2023-10-03 NOTE — PROGRESS NOTES
Outpatient Occupational Therapy Ortho Initial Evaluation  [address] [place] [id number]    Patient: Jessie Schultz   : 1995  Referring practitioner: SANDRINE Lopez  Date of Initial Visit: 10/3/2023  Today's Date: 10/3/2023  Patient seen for 1 sessions  Diagnosis/ICD-10 Code:      ICD-10-CM ICD-9-CM   1. Sprain of left wrist, initial encounter  S63.502A 842.00   2. Left wrist pain  M25.532 719.43   3. Stiffness of left wrist joint  M25.632 719.53                Subjective Questionnaire: QuickDASH: 47      Subjective Evaluation    History of Present Illness  Date of onset: 2023  Mechanism of injury: At work at [employer].  Working on line putting parts together, and had to unload cardboard boxes and placed them out of the way and walking around machine tripped on boxes and fell on L wrist.  Had carpal tunnel sx 23 was cleared to be back at work.  Cleared L wrist for fractures.  Wearing wristlet at work only, off at home      Patient Occupation:    Precautions and Work Restrictions: Light duty #2 lifting, pushing,pulling, or repetitive workQuality of life: excellent    Pain  Current pain ratin  At worst pain rating: 10  Location: L wrist all the way around  Quality: dull ache, sharp and throbbing (shooting)  Relieving factors: medications (anti-inflammatory - isn't helping)  Aggravating factors: sleeping  Progression: no change    Social Support  Lives in: one-story house  Lives with: alone    Hand dominance: right    Diagnostic Tests  X-ray: normal    Treatments  Previous treatment: medication and immobilization  Patient Goals  Patient goals for therapy: return to work, decreased pain, increased strength and increased motion         Past Medical hx: CTR L 23    Objective          Neurological Testing     Sensation     Wrist/Hand   Left   Intact: light touch    Comments   Left light touch: 2.83 SW all digits     Active Range of Motion     Right Wrist   Wrist flexion:  45 degrees   Wrist extension: 45 degrees   Radial deviation: 15 degrees   Ulnar deviation: 15 degrees     Additional Active Range of Motion Details  Supination 80 degrees   Pronation 85 degrees     0-65 0-105 0-70  0-55 0-100 0-75  0-50 0-95 0-60  0-40 0-90 0-85        Strength/Myotome Testing     Left Wrist/Hand      (2nd hand position)   Left  strength (2nd hand position) 10 lbs    Right Wrist/Hand      (2nd hand position)   Right  strength (2nd hand position) 60 lbs    Swelling     Left Wrist/Hand   Circumference wrist: 17.2 cm        Assessment & Plan       Assessment  Impairments: abnormal coordination, abnormal muscle firing, abnormal or restricted ROM, activity intolerance, impaired physical strength, lacks appropriate home exercise program, pain with function, safety issue and weight-bearing intolerance   Functional limitations: carrying objects, lifting, pulling, pushing, reaching behind back, reaching overhead and unable to perform repetitive tasks   Assessment details: Patient presents with L wrist pain, stiffness, and decreased strength s/p fall at work.  Pt tightens wrist brace down on L wrist for comfort from compression, causing indentations that last longer than 30 minutes. Pt is having digital stiffness, decreased strength, wrist pain, and decreased functional .  Pt is wearing wrist brace at work and off at home.   Prognosis: good    Goals  Plan Goals: 1. The patient complains of pain in the L wrist                  LTG 1: 12 weeks:  The patient will report a pain rating of 0/10 or better in order to improve sleep quality and tolerance to performance of activities of daily living.                                  STATUS:  New                  STG 1a: 4weeks:  The patient will report a pain rating of 2/10 or better.                                   STATUS:  New  2. The patient has limited ROM of the L wrist                  LTG 2: 12 weeks:  The patient will demonstrate 140  degrees of wrist CUNHA to allow the patient to WB.                                  STATUS:  New                   STG 2a: 4 weeks:  The patient will demonstrate 110 degrees of L wrist CUNHA.                                  STATUS:  New                             3. The patient has limited strength of the L UE.                  LTG 3: 12 weeks:  The patient will demonstrate 60# in order to return to PLOF.                                  STATUS:  New                  STG 3a: 4 weeks:  The patient will demonstrate tolerance to light strengthening without adverse reaction.                                  STATUS:  New  4. Carrying, Moving, and Handling Objects Functional Limitation                                    LTG 4: 12 weeks:  The patient will demonstrate 0% limitation by achieving a score of 11 on the Quick DASH.                                  STATUS:  New                  STG 4a: 4 weeks:  The patient will demonstrate 60-79% limitation by achieving a score of 40 on the Quick DASH.                                    STATUS:  New                    TREATMENT: Orthotic fabrication/fitting/management and training, Manual therapy, therapeutic exercise, home exercise instruction, and modalities as needed to include: electrical stimulation, ultrasound, moist heat, paraffin, fluidotherapy, dry needling, and ice.       Plan  Planned modality interventions: TENS, thermotherapy (hydrocollator packs), thermotherapy (paraffin bath), ultrasound and electrical stimulation/Russian stimulation  Other planned modality interventions: fluidotherapy, dry needling  Planned therapy interventions: manual therapy, ADL retraining, motor coordination training, neuromuscular re-education, soft tissue mobilization, fine motor coordination training, body mechanics training, balance/weight-bearing training, functional ROM exercises, flexibility, spinal/joint mobilization, strengthening, stretching, therapeutic activities, IADL retraining,  joint mobilization and home exercise program  Frequency: 2x week  Duration in weeks: 12  Treatment plan discussed with: patient      Patient is indicated for skilled occupational therapy services.    History # of Personal Factors and/or Comorbidities: MODERATE (1-2)  Examination of Body System(s): # of elements: MODERATE (3)  Clinical Presentation: EVOLVING  Clinical Decision Making: MODERATE     Evaluation:  Low Complexity:    0     mins  23501;  Mod Complexity:    30     mins  47500;  High Complexity:    0     mins  03703;    Timed:  Manual Therapy:    0     mins  06479;  Therapeutic Exercise:    10     mins  55116;  Therapeutic Activity:    0     mins  77142;     Neuromuscular Linda:    10    mins  01829;    Ultrasound:     0     mins  16261;    Electrical Stimulation:    0     mins  49421;    Untimed:  Electrical Stimulation:    0     mins  25054 ( );  Fluidotherapy:        0    mins  11278  Dry Needlin    mins      Timed Treatment:   20   mins   Total Treatment:     50   mins      OT SIGNATURE: VIJAY Starr, OTR/L, CHT     Electronically signed    [place] LICENSE: [id number]   DATE TREATMENT INITIATED: 10/3/2023    Initial Certification  Certification Period: 10/3/2023 thru 2023  I certify that the therapy services are furnished while this patient is under my care.  The services outlined above are required by this patient, and will be reviewed every 90 days.     Signature:______________________________________________ PHYSICIAN:  Lj Dao PA   NPI: [id number]                                      DATE:    Please sign and return via fax to [phone]   Thank you, [facility].

## 2023-10-05 ENCOUNTER — TREATMENT (OUTPATIENT)
Dept: PHYSICAL THERAPY | Facility: CLINIC | Age: 28
End: 2023-10-05
Payer: OTHER MISCELLANEOUS

## 2023-10-05 DIAGNOSIS — M25.632 STIFFNESS OF LEFT WRIST JOINT: ICD-10-CM

## 2023-10-05 DIAGNOSIS — M25.532 LEFT WRIST PAIN: ICD-10-CM

## 2023-10-05 DIAGNOSIS — S63.502D SPRAIN OF LEFT WRIST, SUBSEQUENT ENCOUNTER: Primary | ICD-10-CM

## 2023-10-05 PROCEDURE — 97110 THERAPEUTIC EXERCISES: CPT | Performed by: PHYSICAL THERAPIST

## 2023-10-05 PROCEDURE — 97530 THERAPEUTIC ACTIVITIES: CPT | Performed by: PHYSICAL THERAPIST

## 2023-10-05 PROCEDURE — 97112 NEUROMUSCULAR REEDUCATION: CPT | Performed by: PHYSICAL THERAPIST

## 2023-10-05 NOTE — PROGRESS NOTES
Chronic patient Established Note (Follow up visit)      SUBJECTIVE:    April Wendt Schamberger presents to the clinic for a follow-up appointment for neck pain. She states pain has increased over the las few weeks, discussed that we will repeat TP injections in office today her last TP injections were on  17 with good relief.  Pt agreed and understood.  Since the last visit, April Wendt Schamberger states the pain has been improving. Current pain intensity is 5/10.    Pain Disability Index Review:  Last 3 PDI Scores 2017   Pain Disability Index (PDI) 2 10 0     Pain Medications:      - Opioids: None  - Adjuvant Medications: Advil,Motrin ( Ibuprofen)  - Anti-Coagulants: None  - Others: see medications list.    Opioid Contract: no     report:  Reviewed and consistent with medication use as prescribed.    Pain Procedures: 17 TRIGGER POINT INJECTION  17 TRIGGER POINT INJECTION  17 bilateral C4,5,6 CERVICAL FACET MEDIAL BRANCH NERVE BLOCK (Prone) ( after xray correlation with pain level, decsion was made to proceed at C4,5,6 levels, patient agrees to proceed     Physical Therapy/Home Exercise: no    Imagin17 MRI Cervical Spine Without Contrast  Narrative   Technique: Multiplanar, multisequence MR imaging of the cervical spine obtained without the use of IV contrast.     Comparison: Cervical spine radiographs 12/15/2016.     Results:    There is straightening with mild reversal of the normal cervical lordosis. Vertebral body heights are maintained with no evidence of fracture. Mild intervertebral disc space narrowing and desiccation are visualized at C5-6 and C6-7. No marrow signal abnormality suspicious for an infiltrative process.      The cervical cord is normal in caliber and signal characteristics.  The craniocervical junction and visualized intracranial contents are unremarkable.  The adjacent soft tissue structures show no significant abnormalities.      C2-C3:  Occupational Therapy Daily Treatment Note  [address] [place] [address]      Patient: Jessie Schultz   : 1995  Referring practitioner: SANDRINE Lopez  Date of Initial Visit: Type: THERAPY  Noted: 10/3/2023  Today's Date: 10/5/2023  Patient seen for 2 sessions         Subjective   Jessie Schultz reports: it is about the same, but I can feel it getting better    Objective   See Exercise, Manual, and Modality Logs for complete treatment.     Pt reports pulling and tingling with median nerve glides. Digital extensions added today with good tolerance.  Assessment/Plan    Visit Diagnoses:    ICD-10-CM ICD-9-CM   1. Sprain of left wrist, subsequent encounter  S63.502D V58.89     842.00   2. Left wrist pain  M25.532 719.43   3. Stiffness of left wrist joint  M25.632 719.53       Continue per POC         Timed:  Manual Therapy:    0     mins  64322;  Therapeutic Exercise:    10     mins  96129;     Therapeutic Activity:    10     mins  05324;  Ultrasound:     0     mins  62960;    Electrical Stimulation:    0     mins 70516;  Neuromuscular Linda:    8    mins  83621;      Timed Treatment:   28   mins   Total Treatment:     28   min    ADDIS Abernathy/L  Occupational Therapist    Electronically signed   License number [id number]    No significant spinal canal or neural foraminal narrowing.    C3-C4: No significant spinal canal or neural foraminal narrowing.    C4-C5:  No significant spinal canal or neural foraminal narrowing.    C5-C6:  Disc bulge with right uncovertebral spurring. Findings result in mild spinal canal stenosis and mild right neural foraminal narrowing.    C6-C7:  Mild disc bulge with thickening of the ligamentum flavum results in mild spinal canal stenosis.No significant neuroforaminal narrowing.    C7-T1:   No significant central spinal or neural foraminal narrowing.   Impression      Mild disc bulge and spinal canal stenosis at the C5-6 and C6-7 levels.      Electronically signed by: UMER MARTIN MD  Date: 01/26/17  Time: 12:56     Encounter   View Encounter      Reviewed By   Hunter Jimenez MD on 1/27/2017  2:18 PM   Exam Details             Performed Procedure Technologist Supporting Staff Performing Physician   MRI Cervical Spine Without Contrast Andre Hinds, RT        Appointment Date/Status Modality Department       1/26/2017     Completed [facility] MRI1 [facility]      Begin Exam End Exam Begin Exam Questionnaires End Exam Questionnaires    1/26/2017 11:22 AM 1/26/2017 11:59 AM MRI TECH NAVIGATOR QUESTIONS IMAGING END ALL        RIS PREGNANCY TECH NAVIGATOR         X-Ray Cervical Spine AP Lat with Flexion  Extension 12/15/16  Narrative     Cervical spine radiographs     comparison: None    Results: AP, lateral, flexion and extension views  .  The alignment is normal, vertebral body height and disk spaces are well-maintained.    Flexion and extension views demonstrate no translational abnormalities.  Very small anterior osteophyte noted at C5-C6.  No fracture or osseous lesion seen.Prevertebral soft tissues appear normal.   Impression    No significant abnormality seen      Electronically signed by: JOSE A JENKINS MD  Date: 12/15/16  Time: 10:52                Allergies:   Review of patient's allergies  indicates:   Allergen Reactions    Azithromycin Nausea And Vomiting       Current Medications:   Current Outpatient Prescriptions   Medication Sig Dispense Refill    LOPERAMIDE HCL (IMODIUM A-D ORAL) Take 4 tablets by mouth every morning.       omeprazole (PRILOSEC) 20 MG capsule Take 40 mg by mouth once daily.       promethazine (PHENERGAN) 25 MG tablet Take 25 mg by mouth nightly as needed for Nausea.      tizanidine (ZANAFLEX) 4 MG tablet Take 1 tablet (4 mg total) by mouth 3 (three) times daily as needed. 90 tablet 3    zolpidem (AMBIEN) 10 mg Tab Take 1 tablet (10 mg total) by mouth nightly as needed. 30 tablet 2     No current facility-administered medications for this visit.        REVIEW OF SYSTEMS:    GENERAL: No weight loss, malaise or fevers.  HEENT: Negative for frequent or significant headaches. + HAs 3-4 days out of the week.  NECK: Negative for lumps, goiter, pain and significant neck swelling.  RESPIRATORY: Negative for cough, wheezing or shortness of breath.  CARDIOVASCULAR: Negative for chest pain, leg swelling or palpitations.  GI: Negative for abdominal discomfort, blood in stools or black stools or change in bowel habits. +IBS  MUSCULOSKELETAL: See HPI.  SKIN: Negative for lesions, rash, and itching.  PSYCH: + for sleep disturbance, mood disorder and recent psychosocial stressors.  HEMATOLOGY/LYMPHOLOGY: Negative for prolonged bleeding, bruising easily or swollen nodes.   NEURO: No history of headaches, syncope, paralysis, seizures or tremors.  All other reviewed and negative other than HPI.    Past Medical History:  Past Medical History:   Diagnosis Date    Abnormal Pap smear of cervix 2000    Cryo Done    ADD (attention deficit disorder)     Breast disorder     Breast Cysts    Esophageal reflux     Fibroids     IBS (irritable bowel syndrome)     Insomnia     Kidney stones     Mastocytosis     Upper GI bleed        Past Surgical History:  Past Surgical History:   Procedure  Laterality Date    COLONOSCOPY N/A 4/28/2017    Procedure: COLONOSCOPY;  Surgeon: Bren Larkin MD;  Location: [facility] ENDO;  Service: Endoscopy;  Laterality: N/A;    ESOPHAGOGASTRODUODENOSCOPY  2012    LASIK Bilateral 2005    TONSILLECTOMY, ADENOIDECTOMY  1988       Family History:  Family History   Problem Relation Age of Onset    Breast cancer Mother      with Metastasis    Cancer Mother      Breast    Hypertension Mother     Prostate cancer Father     Hypertension Father     Cancer Father      Prostate Ca    Diabetes Father     Deep vein thrombosis Father     Pancreatic cancer Maternal Grandfather     Breast cancer Paternal Grandmother     Cancer Paternal Grandmother     Diabetes type II Paternal Grandfather     Heart attack Maternal Grandmother     Colon cancer Cousin     Cancer Cousin 31    Ovarian cancer Neg Hx     Lymphoma Neg Hx     Tuberculosis Neg Hx     Celiac disease Neg Hx     Cirrhosis Neg Hx     Colon polyps Neg Hx     Crohn's disease Neg Hx     Cystic fibrosis Neg Hx     Esophageal cancer Neg Hx     Hemochromatosis Neg Hx     Inflammatory bowel disease Neg Hx     Irritable bowel syndrome Neg Hx     Liver cancer Neg Hx     Liver disease Neg Hx     Rectal cancer Neg Hx     Stomach cancer Neg Hx     Ulcerative colitis Neg Hx     Donavon's disease Neg Hx        Social History:  Social History     Social History    Marital status:      Spouse name: N/A    Number of children: 1    Years of education: N/A     Social History Main Topics    Smoking status: Former Smoker    Smokeless tobacco: Never Used    Alcohol use 0.0 oz/week      Comment: Social    Drug use: No    Sexual activity: Yes     Partners: Male     Birth control/ protection: Coitus interruptus, Rhythm      Comment: : Boris     Other Topics Concern    None     Social History Narrative    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  Psych:  Mood and affect appropriate.  Skin: Skin color, texture, turgor normal, no rashes or lesions, in both upper and lower body.  Head/face:  Atraumatic, normocephalic. No palpable lymph nodes  Neck: + pain to palpation over the cervical paraspinous muscles. Spurling Negative. + pain with neck flexion, extension, or lateral flexion. .  Cor: RRR  Pulm: CTA  GI: Abdomen soft and non-tender.  Back: Straight leg raising in the sitting and supine positions is negative to radicular pain. No pain to palpation over the spine or costovertebral angles. Normal range of motion without pain reproduction.  Extremities: Peripheral joint ROM is full and pain free without obvious instability or laxity in all four extremities. No deformities, edema, or skin discoloration. Good capillary refill.  Musculoskeletal: Shoulder, hip, sacroiliac and knee provocative maneuvers are negative. Bilateral upper and lower extremity strength is normal and symmetric.  No atrophy or tone abnormalities are noted.  Neuro: Bilateral upper and lower extremity coordination and muscle stretch reflexes are physiologic and symmetric.  Plantar response are downgoing. No loss of sensation is noted.  Gait: Normal.    ASSESSMENT: 34 y.o. year old female with neck and shoulder  pain, consistent with      Diagnosis:    1. Myofascial muscle pain  Ambulatory consult to Physical Therapy   2. Cervical pain (neck)  Ambulatory consult to Physical Therapy   3. Muscle spasm  Ambulatory consult to Physical Therapy   4. Facet arthropathy, cervical  Ambulatory consult to Physical Therapy   5. Neck pain           PLAN:     - I have stressed the importance of physical activity and a home exercise plan to help with pain and improve health.  - Patient can continue with medications for now since they are  providing benefits, using them appropriately, and without side effects.  - Counseled patient regarding the importance of activity modification, constant sleeping habits and physical therapy.  -Referral to Physical Therapy today.   -S/F TP injections in office today to help with cervical and shoulder pain bilaterally.   -I have explained the risks, benefits, and alternatives of the procedure in detail. The patient voices understanding and all questions have been answered. The patient agrees to proceed as planned. Written Consent obtained.   -RTC as needed for returning or new pain  -The above plan and management options were discussed at length with patient. Patient is in agreement with the above and verbalized understanding. Dr. Jimenez   was consulted on this patient  and agrees with this plan.      JOCY Marie    09/18/2017

## 2023-10-09 ENCOUNTER — TREATMENT (OUTPATIENT)
Dept: PHYSICAL THERAPY | Facility: CLINIC | Age: 28
End: 2023-10-09
Payer: OTHER MISCELLANEOUS

## 2023-10-09 DIAGNOSIS — M25.532 LEFT WRIST PAIN: ICD-10-CM

## 2023-10-09 DIAGNOSIS — S63.502A SPRAIN OF LEFT WRIST, INITIAL ENCOUNTER: ICD-10-CM

## 2023-10-09 DIAGNOSIS — M25.632 STIFFNESS OF LEFT WRIST JOINT: ICD-10-CM

## 2023-10-09 DIAGNOSIS — S63.502D SPRAIN OF LEFT WRIST, SUBSEQUENT ENCOUNTER: Primary | ICD-10-CM

## 2023-10-09 PROCEDURE — 97110 THERAPEUTIC EXERCISES: CPT | Performed by: OCCUPATIONAL THERAPIST

## 2023-10-09 PROCEDURE — 97022 WHIRLPOOL THERAPY: CPT | Performed by: OCCUPATIONAL THERAPIST

## 2023-10-09 PROCEDURE — 97530 THERAPEUTIC ACTIVITIES: CPT | Performed by: OCCUPATIONAL THERAPIST

## 2023-10-09 PROCEDURE — 97112 NEUROMUSCULAR REEDUCATION: CPT | Performed by: OCCUPATIONAL THERAPIST

## 2023-10-09 NOTE — PROGRESS NOTES
Occupational Therapy Daily Treatment Note      Patient: Jessie Schultz   : 1995  Referring practitioner: SANDRINE Lopez  Date of Initial Visit: Type: THERAPY  Noted: 10/3/2023  Today's Date: 10/9/2023  Patient seen for 3 sessions    ICD-10-CM ICD-9-CM   1. Sprain of left wrist, subsequent encounter  S63.502D V58.89     842.00   2. Left wrist pain  M25.532 719.43   3. Stiffness of left wrist joint  M25.632 719.53   4. Sprain of left wrist, initial encounter  S63.502A 842.00          Jessie Schultz reports 5/10 pain, I haven't been doing too much.      Objective   See Exercise, Manual, and Modality Logs for complete treatment.   Pt continues to have hypersensitivity in the incision/scar tissue over carpal tunnel.  Pt educated to do self massage with scar tissue and continue with desensitization techniques    Assessment/Plan  Pt tolerated treatment well this date.  Added Fluido for desensitiztion       Cont per POC           Timed:  Manual Therapy:    2     mins  86465;  Therapeutic Exercise:    10     mins  83867;  Therapeutic Activity:    8     mins  64580;     Neuromuscular Linda:    10    mins  75870;    Ultrasound:     0     mins  96534;    Electrical Stimulation:    0     mins  80287;    Untimed:  Electrical Stimulation:    0     mins  52779 ( );  Fluidotherapy:        10    mins  00104  Dry Needlin    mins  85383    Timed Treatment:   30   mins   Total Treatment:     40   mins    OT SIGNATURE: VIJAY Starr, OTR/L, CHT     Electronically signed    [place] LICENSE: [id number]

## 2023-10-11 ENCOUNTER — TREATMENT (OUTPATIENT)
Dept: PHYSICAL THERAPY | Facility: CLINIC | Age: 28
End: 2023-10-11
Payer: OTHER MISCELLANEOUS

## 2023-10-11 DIAGNOSIS — M25.632 STIFFNESS OF LEFT WRIST JOINT: ICD-10-CM

## 2023-10-11 DIAGNOSIS — Z98.890 S/P CARPAL TUNNEL RELEASE: ICD-10-CM

## 2023-10-11 DIAGNOSIS — S63.502A SPRAIN OF LEFT WRIST, INITIAL ENCOUNTER: ICD-10-CM

## 2023-10-11 DIAGNOSIS — M25.532 LEFT WRIST PAIN: ICD-10-CM

## 2023-10-11 DIAGNOSIS — S63.502D SPRAIN OF LEFT WRIST, SUBSEQUENT ENCOUNTER: Primary | ICD-10-CM

## 2023-10-11 RX ORDER — DICLOFENAC SODIUM 75 MG/1
75 TABLET, DELAYED RELEASE ORAL 2 TIMES DAILY
Qty: 60 TABLET | Refills: 1 | Status: SHIPPED | OUTPATIENT
Start: 2023-10-11

## 2023-10-11 NOTE — PROGRESS NOTES
Occupational Therapy Daily Treatment Note      Patient: Jessie Schultz   : 1995  Referring practitioner: SANDRINE Lopez  Date of Initial Visit: Type: THERAPY  Noted: 10/3/2023  Today's Date: 10/11/2023  Patient seen for 4 sessions    ICD-10-CM ICD-9-CM   1. Sprain of left wrist, subsequent encounter  S63.502D V58.89     842.00   2. Left wrist pain  M25.532 719.43   3. Stiffness of left wrist joint  M25.632 719.53   4. Sprain of left wrist, initial encounter  S63.502A 842.00          Jessie Schultz reports I am having more pain in the palm, /10 pain.       Objective   See Exercise, Manual, and Modality Logs for complete treatment.   Pt had increased stiffness and pain with functional tasks.  Pt had difficulty doing Baoding balls this date.    Assessment/Plan  Reviewed scar massage and sensory re-ed.        Cont per POC           Timed:  Manual Therapy:    0     mins  44450;  Therapeutic Exercise:    10     mins  10736;  Therapeutic Activity:    10     mins  70730;     Neuromuscular Linda:    10    mins  87173;    Ultrasound:     0     mins  67591;    Electrical Stimulation:    0     mins  72619;    Untimed:  Electrical Stimulation:    0     mins  86573 ( );  Fluidotherapy:        10    mins  08899  Dry Needlin    mins  71870    Timed Treatment:   30   mins   Total Treatment:     40   mins    OT SIGNATURE: VIJAY Starr, OTR/L, CHT     Electronically signed    [place] LICENSE: [id number]

## 2023-10-11 NOTE — LETTER
Progress Note  10/12/2023  Lj Dao PA    Re: Jessie Schultz  ________________________________________________________________    Miss Jessie Schultz, has attended 3 OT sessions.  Treatment has consisted of: AROM, PROM, scar mgmt, pain mgmt, neuro re-ed     S: Miss Jessie Schultz states: Currently at 7/10 pain.  Palm is sore (pillar area) burning pain.     O: Pt having hypersensitivity in L palm over incision, pillar area.  Pt has poor tolerance for WB, unable to tolerate  this date.  Pt gets some relief with heat/fluido machine.  Educated on nerve glides, AROM, stretches.        ACTIVITY TOLERANCE: poor tolerance for gripping and pinching activity     A: Cont therapy for desensitization and pain mgmt.     P: Please advise after your exam.    Thank you for this referral.  Angella Grullon, OTD, OTR/L, CHT

## 2023-10-16 ENCOUNTER — TREATMENT (OUTPATIENT)
Dept: PHYSICAL THERAPY | Facility: CLINIC | Age: 28
End: 2023-10-16
Payer: OTHER MISCELLANEOUS

## 2023-10-16 DIAGNOSIS — S63.502D SPRAIN OF LEFT WRIST, SUBSEQUENT ENCOUNTER: Primary | ICD-10-CM

## 2023-10-16 DIAGNOSIS — M25.632 STIFFNESS OF LEFT WRIST JOINT: ICD-10-CM

## 2023-10-16 DIAGNOSIS — M25.532 LEFT WRIST PAIN: ICD-10-CM

## 2023-10-16 PROCEDURE — 97110 THERAPEUTIC EXERCISES: CPT | Performed by: OCCUPATIONAL THERAPIST

## 2023-10-16 PROCEDURE — 97530 THERAPEUTIC ACTIVITIES: CPT | Performed by: OCCUPATIONAL THERAPIST

## 2023-10-16 PROCEDURE — 97022 WHIRLPOOL THERAPY: CPT | Performed by: OCCUPATIONAL THERAPIST

## 2023-10-16 PROCEDURE — 97112 NEUROMUSCULAR REEDUCATION: CPT | Performed by: OCCUPATIONAL THERAPIST

## 2023-10-16 NOTE — PROGRESS NOTES
Occupational Therapy Daily Treatment Note      Patient: Jessie Schultz   : 1995  Referring practitioner: SANDRINE Lopez  Date of Initial Visit: Type: THERAPY  Noted: 10/3/2023  Today's Date: 10/16/2023  Patient seen for 5 sessions    ICD-10-CM ICD-9-CM   1. Sprain of left wrist, subsequent encounter  S63.502D V58.89     842.00   2. Left wrist pain  M25.532 719.43   3. Stiffness of left wrist joint  M25.632 719.53          Jessie Schultz reports I don't have to wear my brace at work anymore 6.5-7/10 pain in the palm of L hand.     Objective   See Exercise, Manual, and Modality Logs for complete treatment.   Pt had poor tolerance to complete functional movement.  Reports Baoding balls are too heavy to tolerate movement in L hand.    Assessment/Plan    Cont skilled OT for desensitization, pain control, and strengthening.    Cont per POC           Timed:  Manual Therapy:    0     mins  15335;  Therapeutic Exercise:    10     mins  73098;  Therapeutic Activity:    10     mins  16920;     Neuromuscular Linda:    10    mins  83322;    Ultrasound:     0     mins  28026;    Electrical Stimulation:    0     mins  91209;    Untimed:  Electrical Stimulation:    0     mins  93210 ( );  Fluidotherapy:        10    mins  05776  Dry Needlin    mins      Timed Treatment:   30   mins   Total Treatment:     40   mins    OT SIGNATURE: VIJAY Starr, ELVISR/L, CHT     Electronically signed    [place] LICENSE: [id number]

## 2023-10-20 ENCOUNTER — TREATMENT (OUTPATIENT)
Dept: PHYSICAL THERAPY | Facility: CLINIC | Age: 28
End: 2023-10-20
Payer: OTHER MISCELLANEOUS

## 2023-10-20 DIAGNOSIS — M25.532 LEFT WRIST PAIN: ICD-10-CM

## 2023-10-20 DIAGNOSIS — M25.632 STIFFNESS OF LEFT WRIST JOINT: ICD-10-CM

## 2023-10-20 DIAGNOSIS — S63.502D SPRAIN OF LEFT WRIST, SUBSEQUENT ENCOUNTER: Primary | ICD-10-CM

## 2023-10-20 NOTE — PROGRESS NOTES
Occupational Therapy Daily Treatment Note  [address] [place] [address]      Patient: Jessie Schultz   : 1995  Referring practitioner: SANDRINE Lopez  Date of Initial Visit: Type: THERAPY  Noted: 10/3/2023  Today's Date: 10/20/2023  Patient seen for 6 sessions         Subjective Evaluation    Pain  Current pain ratin         Jessie Schultz reports: the colder it gets the more I have pain.    Objective   See Exercise, Manual, and Modality Logs for complete treatment.     Pt was unable to complete boading balls today. Pt performed remainder of exercises with minimal complaints.  Assessment/Plan    Visit Diagnoses:    ICD-10-CM ICD-9-CM   1. Sprain of left wrist, subsequent encounter  S63.502D V58.89     842.00   2. Left wrist pain  M25.532 719.43   3. Stiffness of left wrist joint  M25.632 719.53       Continue per POC for pain control and range of motion to increase functional use of her L hand.         Timed:  Manual Therapy:    0     mins  01262;  Therapeutic Exercise:    10     mins  32985;     Therapeutic Activity:    0     mins  62727;  Ultrasound:     0     mins  09262;    Electrical Stimulation:    0     mins 34629;  Neuromuscular Linda:    10    mins  54272;    Untimed:  Electrical Stimulation:    0     mins  01542 ( );  Fluidotherapy        10    mins  91894  Paraffin:                          0    mins  94648;    Timed Treatment:   20   mins   Total Treatment:     30   min    ADDIS Abernathy/L  Occupational Therapist    Electronically signed   License number [id number]

## 2023-10-27 ENCOUNTER — TREATMENT (OUTPATIENT)
Dept: PHYSICAL THERAPY | Facility: CLINIC | Age: 28
End: 2023-10-27
Payer: OTHER MISCELLANEOUS

## 2023-10-27 DIAGNOSIS — S63.502D SPRAIN OF LEFT WRIST, SUBSEQUENT ENCOUNTER: Primary | ICD-10-CM

## 2023-10-27 DIAGNOSIS — M25.532 LEFT WRIST PAIN: ICD-10-CM

## 2023-10-27 DIAGNOSIS — S63.502A SPRAIN OF LEFT WRIST, INITIAL ENCOUNTER: ICD-10-CM

## 2023-10-27 DIAGNOSIS — M25.632 STIFFNESS OF LEFT WRIST JOINT: ICD-10-CM

## 2023-10-27 NOTE — PROGRESS NOTES
Occupational Therapy Daily Treatment Note      Patient: Jessie Schultz   : 1995  Referring practitioner: SANDRINE Lopez  Date of Initial Visit: Type: THERAPY  Noted: 10/3/2023  Today's Date: 10/27/2023  Patient seen for 7 sessions    ICD-10-CM ICD-9-CM   1. Sprain of left wrist, subsequent encounter  S63.502D V58.89     842.00   2. Left wrist pain  M25.532 719.43   3. Stiffness of left wrist joint  M25.632 719.53   4. Sprain of left wrist, initial encounter  S63.502A 842.00          Jessie Schultz reports 5.5-6/10 pain in L wrist        Objective   See Exercise, Manual, and Modality Logs for complete treatment.   Pt moving slowly with all AROM.  Encouraged to continue AROM to help reduce stiffness.   Unable to tolerate movement of Boading balls, reports she feels it in her thumb.      Assessment/Plan    Cont to encourage AROM, neuro re-ed.  Have patient d/c brace completely    Cont per POC           Timed:  Manual Therapy:    0     mins  59823;  Therapeutic Exercise:    10     mins  76899;  Therapeutic Activity:    10     mins  29929;     Neuromuscular Linda:    10    mins  38905;    Ultrasound:     0     mins  29051;    Electrical Stimulation:    0     mins  19825;    Untimed:  Electrical Stimulation:    0     mins  45912 ( );  Fluidotherapy:        0    mins  79123  Dry Needlin    mins  69613    Timed Treatment:   30   mins   Total Treatment:     30   mins    OT SIGNATURE: VIJAY Starr, OTR/L, CHT     Electronically signed    [place] LICENSE: [id number]

## 2023-10-31 ENCOUNTER — TELEPHONE (OUTPATIENT)
Dept: ORTHOPEDIC SURGERY | Facility: CLINIC | Age: 28
End: 2023-10-31
Payer: OTHER MISCELLANEOUS

## 2023-10-31 ENCOUNTER — OFFICE VISIT (OUTPATIENT)
Dept: ORTHOPEDIC SURGERY | Facility: CLINIC | Age: 28
End: 2023-10-31
Payer: OTHER MISCELLANEOUS

## 2023-10-31 VITALS
OXYGEN SATURATION: 98 % | BODY MASS INDEX: 40.92 KG/M2 | WEIGHT: 270 LBS | DIASTOLIC BLOOD PRESSURE: 82 MMHG | HEIGHT: 68 IN | SYSTOLIC BLOOD PRESSURE: 142 MMHG | HEART RATE: 75 BPM

## 2023-10-31 DIAGNOSIS — M25.532 LEFT WRIST PAIN: Primary | ICD-10-CM

## 2023-10-31 DIAGNOSIS — S69.92XA LEFT WRIST INJURY, INITIAL ENCOUNTER: ICD-10-CM

## 2023-10-31 DIAGNOSIS — M25.522 LEFT ELBOW PAIN: ICD-10-CM

## 2023-10-31 PROCEDURE — 99213 OFFICE O/P EST LOW 20 MIN: CPT | Performed by: ORTHOPAEDIC SURGERY

## 2023-10-31 RX ORDER — MELOXICAM 15 MG/1
15 TABLET ORAL DAILY
Qty: 30 TABLET | Refills: 1 | Status: SHIPPED | OUTPATIENT
Start: 2023-10-31

## 2023-10-31 RX ORDER — METHYLPREDNISOLONE 4 MG/1
TABLET ORAL
Qty: 21 TABLET | Refills: 0 | Status: SHIPPED | OUTPATIENT
Start: 2023-10-31

## 2023-10-31 NOTE — TELEPHONE ENCOUNTER
Caller: Jessie Schultz    Relationship to patient: Self    Best call back number: [phone]    Patient is needing: UNABLE TO WARM TRANSFER.  PATIENT WAS RUNNING LATE FOR APPT AND CAN NOT MAKE IT BY MAGDI PERIOD TIME.  SHE WANTED TO SEE IF SHE COULD COME AT 1045 APPOINTMENT THAT SHOWS OPEN FOR TODAY.  PLEASE CONTACT PATIENT AND RESCHEDULE IF SO

## 2023-10-31 NOTE — PROGRESS NOTES
"Chief Complaint  Initial Evaluation and Pain of the Left Wrist and Initial Evaluation and Pain of the Left Elbow     Subjective      Jessie Schultz presents to [facility] for initial evaluation of the left wrist and left elbow. She on 8/24/23 had an injury at work.  She tripped over a box and fell on the floor and landed on her left wrist and then transitioned to her elbow. She was in a splint for awhile and has had physical therapy.  She still has stiffness due to being in the splint. She is having pain on the sides of the left wrist.  She has decrease ROM of the left wrist.     Allergies   Allergen Reactions    Sulfa Antibiotics Hives    Sulfacetamide Sodium Hives    Sulfamethoxazole Hives    Sulfamethoxazole-Trimethoprim Hives    Sulfasalazine Hives    Sulfonylureas Hives        Social History     Socioeconomic History    Marital status: Single   Tobacco Use    Smoking status: Never     Passive exposure: Never    Smokeless tobacco: Never   Vaping Use    Vaping Use: Every day    Substances: Flavoring    Devices: Refillable tank   Substance and Sexual Activity    Alcohol use: Not Currently    Drug use: Never    Sexual activity: Defer        I reviewed the patient's chief complaint, history of present illness, review of systems, past medical history, surgical history, family history, social history, medications, and allergy list.     Review of Systems     Constitutional: Denies fevers, chills, weight loss  Cardiovascular: Denies chest pain, shortness of breath  Skin: Denies rashes, acute skin changes  Neurologic: Denies headache, loss of consciousness        Vital Signs:   /82   Pulse 75   Ht 171.5 cm (67.5\")   Wt 122 kg (270 lb)   SpO2 98%   BMI 41.66 kg/m²          Physical Exam  General: Alert. No acute distress    Ortho Exam        LEFT WRIST  Mildly Full , thumb opposition, MCP flexors, DIP flexors and PIP flexors. Sensation grossly intact to light touch, median, " radial and ulnar nerve. Positive AIN, PIN and ulnar nerve motor function intact. Axillary nerve intact. Positive pulses.  She has decrease flexion of the wrist.     LEFT ELBOW Non-tender lateral epicondyle. Non-tender medial epicondyle. Non-tender radial head. Sensation to light touch median, radial, ulnar nerve. Positive AIN, PIN, ulnar nerve motor function. Axillary sensation intact. Elbow ROM 0-140.    Procedures      Imaging Results (Most Recent)       None             Result Review :               Assessment and Plan     Diagnoses and all orders for this visit:    1. Left wrist pain (Primary)    2. Left elbow pain    3. Left wrist injury, initial encounter        Discussed the treatment plan with the patient.     Continue physical therapy.  Prescribed steroid pack.     Continue light duty.       Educated on risk of smoking/nicotine. Discussed options for smoking cessation. and Call or return if worsening symptoms.    Follow Up     4-6 weeks to assess strength and ROM.       Patient was given instructions and counseling regarding her condition or for health maintenance advice. Please see specific information pulled into the AVS if appropriate.     Scribed for Bill Jones MD by Akosua Schmitt MA.  10/31/23   10:45 EDT    I have personally performed the services described in this document as scribed by the above individual and it is both accurate and complete. Bill Jones MD 10/31/23

## 2023-11-02 ENCOUNTER — TREATMENT (OUTPATIENT)
Dept: PHYSICAL THERAPY | Facility: CLINIC | Age: 28
End: 2023-11-02

## 2023-11-02 DIAGNOSIS — S63.502D SPRAIN OF LEFT WRIST, SUBSEQUENT ENCOUNTER: Primary | ICD-10-CM

## 2023-11-02 DIAGNOSIS — M25.632 STIFFNESS OF LEFT WRIST JOINT: ICD-10-CM

## 2023-11-02 DIAGNOSIS — M25.532 LEFT WRIST PAIN: ICD-10-CM

## 2023-11-02 DIAGNOSIS — S63.502A SPRAIN OF LEFT WRIST, INITIAL ENCOUNTER: ICD-10-CM

## 2023-11-02 NOTE — PROGRESS NOTES
Occupational Therapy Daily Treatment Note      Patient: Jessie Schultz   : 1995  Referring practitioner: SANDRINE Lopez  Date of Initial Visit: Type: THERAPY  Noted: 10/3/2023  Today's Date: 2023  Patient seen for 8 sessions  No diagnosis found.       Jessie Schultz reports pain right in my palm 4-5/10 pain.  The doctor put me on a steroid pack.  It seems to be helping       Objective   See Exercise, Manual, and Modality Logs for complete treatment.   Pt needs max encouragement to move fingers, do dexterity tasks with R hand.   Verbal cues to extend wrist for median nerve glides.     Assessment/Plan    Cont to progress desensitization and functional use of L UE.    Cont per POC           Timed:  Manual Therapy:    0     mins  06427;  Therapeutic Exercise:    10     mins  96941;  Therapeutic Activity:    10     mins  86415;     Neuromuscular Linda:    10    mins  69849;    Ultrasound:     0     mins  76789;    Electrical Stimulation:    0     mins  67168;    Untimed:  Electrical Stimulation:    0     mins  42727 ( );  Fluidotherapy:        0    mins  65250  Dry Needlin    mins  58968    Timed Treatment:   30   mins   Total Treatment:     30   mins    OT SIGNATURE: VIJAY Starr, OTR/L, CHT     Electronically signed    [place] LICENSE: [id number]

## 2023-11-03 ENCOUNTER — TREATMENT (OUTPATIENT)
Dept: PHYSICAL THERAPY | Facility: CLINIC | Age: 28
End: 2023-11-03
Payer: OTHER MISCELLANEOUS

## 2023-11-03 DIAGNOSIS — S63.502D SPRAIN OF LEFT WRIST, SUBSEQUENT ENCOUNTER: Primary | ICD-10-CM

## 2023-11-03 DIAGNOSIS — S63.502A SPRAIN OF LEFT WRIST, INITIAL ENCOUNTER: ICD-10-CM

## 2023-11-03 DIAGNOSIS — M25.632 STIFFNESS OF LEFT WRIST JOINT: ICD-10-CM

## 2023-11-03 DIAGNOSIS — M25.532 LEFT WRIST PAIN: ICD-10-CM

## 2023-11-03 NOTE — PROGRESS NOTES
Re-Assessment / Re-Certification  Occupational Therapy  [address] [place] [id number]      Patient: Jessie Schultz   : 1995  Diagnosis/ICD-10 Code:  Sprain of left wrist, subsequent encounter [S63.502D]  Referring practitioner: SANDRINE Lopez  Date of Initial Visit: Type: THERAPY  Noted: 10/3/2023  Today's Date: 11/3/2023  Patient seen for 9 sessions      Subjective:   Jessie Schultz reports: 4.5-5/10 pain in L wrist.    Subjective Questionnaire: QuickDASH: 29  Clinical Progress: improved  Home Program Compliance: Yes  Treatment has included: therapeutic exercise, neuromuscular re-education, manual therapy, and therapeutic activity    Subjective   Objective   Right Wrist   Wrist flexion: 70 degrees   Wrist extension: 65 degrees   Radial deviation: 20 degrees   Ulnar deviation: 40 degrees   Supination 90 degrees   Pronation 90 degrees      0-80     0-105   0-70  0-85     0-100   0-75  0-85     0-95     0-60  0-85     0-90     0-85           Strength/Myotome Testing      Left Wrist/Hand       (2nd hand position)   Left  strength (2nd hand position) 30 lbs     Right Wrist/Hand       (2nd hand position)   Right  strength (2nd hand position) 60 lbs     Assessment/Plan    Visit Diagnoses:    ICD-10-CM ICD-9-CM   1. Sprain of left wrist, subsequent encounter  S63.502D V58.89     842.00   2. Left wrist pain  M25.532 719.43       Progress toward previous goals: Partially Met    Plan Goals: 1. The patient complains of pain in the L wrist                  LTG 1: 12 weeks:  The patient will report a pain rating of 0/10 or better in order to improve sleep quality and tolerance to performance of activities of daily living.                                  STATUS:  NOT MET                  STG 1a: 4weeks:  The patient will report a pain rating of 2/10 or better.                                   STATUS:  NOT MET  2. The patient has limited ROM of the L wrist                  LTG 2: 12 weeks:   The patient will demonstrate 140 degrees of wrist CUNHA to allow the patient to WB.                                  STATUS:  NOT MET                   STG 2a: 4 weeks:  The patient will demonstrate 110 degrees of L wrist CUNHA.                                  STATUS:  MET                   3. The patient has limited strength of the L UE.                  LTG 3: 12 weeks:  The patient will demonstrate 60# in order to return to PLOF.                                  STATUS:  NOT MET                  STG 3a: 4 weeks:  The patient will demonstrate tolerance to light strengthening without adverse reaction.                                  STATUS:  NOT MET  4. Carrying, Moving, and Handling Objects Functional Limitation                                    LTG 4: 12 weeks:  The patient will demonstrate 0% limitation by achieving a score of 11 on the Quick DASH.                                  STATUS:  NOT MET                  STG 4a: 4 weeks:  The patient will demonstrate 60-79% limitation by achieving a score of 40 on the Quick DASH.                                    STATUS:  MET      Recommendations: Continue as planned  Timeframe: 1 month  Prognosis to achieve goals: good      OT SIGNATURE: VIJAY Starr, OTR/L, CHT     Electronically signed    [place] LICENSE: [id number]   DATE TREATMENT INITIATED: 11/3/2023        Timed:  Manual Therapy:    0     mins  90405;  Therapeutic Exercise:    10     mins  22020;  Therapeutic Activity:    10     mins  95640;     Neuromuscular Linda:    10    mins  08837;    Ultrasound:     0     mins  61174;    Electrical Stimulation:    0     mins  22897;    Untimed:  Electrical Stimulation:    0     mins  50741 ( );  Fluidotherapy:        0    mins  68224  Dry Needlin    mins      Timed Treatment:   30   mins   Total Treatment:     30   mins

## 2023-11-09 ENCOUNTER — TREATMENT (OUTPATIENT)
Dept: PHYSICAL THERAPY | Facility: CLINIC | Age: 28
End: 2023-11-09
Payer: OTHER MISCELLANEOUS

## 2023-11-09 DIAGNOSIS — S63.502A SPRAIN OF LEFT WRIST, INITIAL ENCOUNTER: ICD-10-CM

## 2023-11-09 DIAGNOSIS — M25.532 LEFT WRIST PAIN: ICD-10-CM

## 2023-11-09 DIAGNOSIS — M25.632 STIFFNESS OF LEFT WRIST JOINT: ICD-10-CM

## 2023-11-09 DIAGNOSIS — S63.502D SPRAIN OF LEFT WRIST, SUBSEQUENT ENCOUNTER: Primary | ICD-10-CM

## 2023-11-09 NOTE — PROGRESS NOTES
"Occupational Therapy Daily Treatment Note      Patient: Jessie Schultz   : 1995  Referring practitioner: SANDRINE Lopez  Date of Initial Visit: Type: THERAPY  Noted: 10/3/2023  Today's Date: 2023  Patient seen for 10 sessions    ICD-10-CM ICD-9-CM   1. Sprain of left wrist, subsequent encounter  S63.502D V58.89     842.00   2. Left wrist pain  M25.532 719.43   3. Stiffness of left wrist joint  M25.632 719.53   4. Sprain of left wrist, initial encounter  S63.502A 842.00          Jessie Schultz reports \"I had a weird pain in my Ring Finger this weekend.\" 6/10         Objective   See Exercise, Manual, and Modality Logs for complete treatment.   Pt has hyperhydrosis with functional strengthening tasks with #1 weight.   Pt reports she fgatigued after 7 reps with bicep curls with #1 weight.     Assessment/Plan  Pt progressed with strengthening with L UE.  Encouraged patient to use R hand for functional tasks and increase endurance in R hand.        Cont per POC           Timed:  Manual Therapy:    0     mins  38800;  Therapeutic Exercise:    10     mins  26373;  Therapeutic Activity:    8     mins  99769;     Neuromuscular Linda:    10    mins  05947;    Ultrasound:     0     mins  78146;    Electrical Stimulation:    0     mins  37552;    Untimed:  Electrical Stimulation:    0     mins  24647 ( );  Fluidotherapy:        0    mins  72426  Dry Needlin    mins  30481    Timed Treatment:   8   mins   Total Treatment:     28   mins    OT SIGNATURE: VIJAY Starr, OTR/L, CHT     Electronically signed    [place] LICENSE: [id number]     "

## 2023-11-15 ENCOUNTER — TREATMENT (OUTPATIENT)
Dept: PHYSICAL THERAPY | Facility: CLINIC | Age: 28
End: 2023-11-15
Payer: OTHER MISCELLANEOUS

## 2023-11-15 DIAGNOSIS — S63.502D SPRAIN OF LEFT WRIST, SUBSEQUENT ENCOUNTER: Primary | ICD-10-CM

## 2023-11-15 DIAGNOSIS — M25.632 STIFFNESS OF LEFT WRIST JOINT: ICD-10-CM

## 2023-11-15 DIAGNOSIS — S63.502A SPRAIN OF LEFT WRIST, INITIAL ENCOUNTER: ICD-10-CM

## 2023-11-15 DIAGNOSIS — M25.532 LEFT WRIST PAIN: ICD-10-CM

## 2023-11-15 NOTE — PROGRESS NOTES
Occupational Therapy Daily Treatment Note      Patient: Jessie Schultz   : 1995  Referring practitioner: SANDRINE Lopez  Date of Initial Visit: Type: THERAPY  Noted: 10/3/2023  Today's Date: 11/15/2023  Patient seen for 11 sessions    ICD-10-CM ICD-9-CM   1. Sprain of left wrist, subsequent encounter  S63.502D V58.89     842.00   2. Left wrist pain  M25.532 719.43   3. Stiffness of left wrist joint  M25.632 719.53   4. Sprain of left wrist, initial encounter  S63.502A 842.00          Jessie Schultz reports I am doing better.  Work won't let me do more because of restrictions. 3.5-4/10 pain       Objective   See Exercise, Manual, and Modality Logs for complete treatment.   Pt progressing with functional strengthening with L UE.  Tolerating increased lifting and gripping with decreased overall pain.     Assessment/Plan    Cont skilled OT for strengthening and pain control.    Cont per POC           Timed:  Manual Therapy:    0     mins  13795;  Therapeutic Exercise:    10     mins  49618;  Therapeutic Activity:    10     mins  44164;     Neuromuscular Linda:    10    mins  92791;    Ultrasound:     0     mins  99496;    Electrical Stimulation:    0     mins  83967;    Untimed:  Electrical Stimulation:    0     mins  09662 ( );  Fluidotherapy:        0    mins  63020  Dry Needlin    mins  68010    Timed Treatment:   30   mins   Total Treatment:     30   mins    OT SIGNATURE: VIJAY Starr, ELVISR/L, CHT     Electronically signed    [place] LICENSE: [id number]

## 2023-11-17 ENCOUNTER — TREATMENT (OUTPATIENT)
Dept: PHYSICAL THERAPY | Facility: CLINIC | Age: 28
End: 2023-11-17
Payer: OTHER MISCELLANEOUS

## 2023-11-17 DIAGNOSIS — S63.502D SPRAIN OF LEFT WRIST, SUBSEQUENT ENCOUNTER: ICD-10-CM

## 2023-11-17 DIAGNOSIS — M25.632 STIFFNESS OF LEFT WRIST JOINT: ICD-10-CM

## 2023-11-17 DIAGNOSIS — M25.532 LEFT WRIST PAIN: Primary | ICD-10-CM

## 2023-11-17 DIAGNOSIS — S63.502A SPRAIN OF LEFT WRIST, INITIAL ENCOUNTER: ICD-10-CM

## 2023-11-17 NOTE — PROGRESS NOTES
Occupational Therapy Daily Treatment Note      Patient: Jessie Schultz   : 1995  Referring practitioner: SANDRINE Lopez  Date of Initial Visit: Type: THERAPY  Noted: 10/3/2023  Today's Date: 2023  Patient seen for 12 sessions    ICD-10-CM ICD-9-CM   1. Left wrist pain  M25.532 719.43   2. Sprain of left wrist, initial encounter  S63.502A 842.00   3. Stiffness of left wrist joint  M25.632 719.53   4. Sprain of left wrist, subsequent encounter  S63.502D V58.89     842.00          Jessie Schultz reports I had issues out of my hand yesterday.  I was lifting more than #2 at work and lifting wide and slinging boxes.  My fingers were numb and tingling.  Right now I am not tingling anymore. 1/10 pain      Objective   See Exercise, Manual, and Modality Logs for complete treatment.   Wrist flexion: 80 degrees   Wrist extension: 75 degrees   Radial deviation: 20 degrees   Ulnar deviation: 40 degrees   Supination 90 degrees   Pronation 90 degrees      0-80     0-105   0-70  0-85     0-100   0-75  0-85     0-95     0-60  0-85     0-90     0-85     Left  strength (2nd hand position) 55 lbs       2.83 SW all digits    Assessment/Plan    Pt reports she feels better since anti-inflammatory medication.  Pt still having difficulty with pushing, and lifting. Recommend continue therapy of strengthening and transition to HEP.     Cont per POC           Timed:  Manual Therapy:    0     mins  77208;  Therapeutic Exercise:    10     mins  78792;  Therapeutic Activity:    10     mins  34259;     Neuromuscular Linda:    10    mins  73629;    Ultrasound:     0     mins  85281;    Electrical Stimulation:    0     mins  40961;    Untimed:  Electrical Stimulation:    0     mins  72204 ( );  Fluidotherapy:        0    mins  65384  Dry Needlin    mins      Timed Treatment:   30   mins   Total Treatment:     30   mins    OT SIGNATURE: VIJAY Starr, OTR/L, CHT     Electronically signed    [place]  LICENSE: [id number]

## 2023-11-17 NOTE — LETTER
Progress Note  11/17/2023  Lj Dao PA    Re: Jessie Schultz  ________________________________________________________________    Ms. Jessie Schultz, has attended 12 OT sessions.  Treatment has consisted of: AROM, PROM, neuro re-ed, and functional strengthening     S: Ms. Jessie Schultz states: I had issues out of my hand yesterday.  I was lifting more than #2 at work and lifting wide and slinging boxes.  My fingers were numb and tingling.  Right now I am not tingling anymore. 1/10 pain    O:   Wrist flexion: 80 degrees   Wrist extension: 75 degrees   Radial deviation: 20 degrees   Ulnar deviation: 40 degrees   Supination 90 degrees   Pronation 90 degrees      0-80     0-105   0-70  0-85     0-100   0-75  0-85     0-95     0-60  0-85     0-90     0-85     Left  strength (2nd hand position) 55 lbs     2.83 SW all digits      A: Pt reports she feels better since anti-inflammatory medication.  Pt still having difficulty with pushing, and lifting. Recommend continue therapy of strengthening and transition to HEP.     P: Please advise after your exam.    Thank you for this referral.

## 2023-11-21 ENCOUNTER — TREATMENT (OUTPATIENT)
Dept: PHYSICAL THERAPY | Facility: CLINIC | Age: 28
End: 2023-11-21

## 2023-11-21 ENCOUNTER — OFFICE VISIT (OUTPATIENT)
Dept: ORTHOPEDIC SURGERY | Facility: CLINIC | Age: 28
End: 2023-11-21
Payer: OTHER MISCELLANEOUS

## 2023-11-21 VITALS — OXYGEN SATURATION: 98 % | SYSTOLIC BLOOD PRESSURE: 134 MMHG | DIASTOLIC BLOOD PRESSURE: 91 MMHG | HEART RATE: 83 BPM

## 2023-11-21 DIAGNOSIS — S69.92XA LEFT WRIST INJURY, INITIAL ENCOUNTER: ICD-10-CM

## 2023-11-21 DIAGNOSIS — M25.532 LEFT WRIST PAIN: Primary | ICD-10-CM

## 2023-11-21 DIAGNOSIS — S63.502D SPRAIN OF LEFT WRIST, SUBSEQUENT ENCOUNTER: ICD-10-CM

## 2023-11-21 DIAGNOSIS — M25.632 STIFFNESS OF LEFT WRIST JOINT: ICD-10-CM

## 2023-11-21 DIAGNOSIS — S63.502A SPRAIN OF LEFT WRIST, INITIAL ENCOUNTER: ICD-10-CM

## 2023-11-21 RX ORDER — DICLOFENAC SODIUM 75 MG/1
75 TABLET, DELAYED RELEASE ORAL 2 TIMES DAILY
COMMUNITY

## 2023-11-21 NOTE — PROGRESS NOTES
Chief Complaint  Follow-up of the Left Elbow and Follow-up of the Left Wrist    Subjective      Jessie Schultz presents to [facility] for follow up of Her left wrist and left elbow.  She reports on 8/24/2023 she had an injury at work.  She reports tripping over a box and fell forward landing on her left wrist and elbow.  During her last office visit on 10/31/2023 she was prescribed a steroid pack to help with pain and recommended to continue the physical therapy.  She was given a light duty restriction note for work as well.  This is a workers comp case.    Today, she reports the elbow is significantly better and isn't causing her issues anymore. She is mainly having issues with the wrist but it is also improving. She was having some numbness and tingling but it is resolved and hasn't returned. Her  has improved.     Allergies   Allergen Reactions    Sulfa Antibiotics Hives    Sulfacetamide Sodium Hives    Sulfamethoxazole Hives    Sulfamethoxazole-Trimethoprim Hives    Sulfasalazine Hives    Sulfonylureas Hives       Objective     Vital Signs:   Vitals:    11/21/23 0850   BP: 134/91   Pulse: 83   SpO2: 98%   PainSc:   5     There is no height or weight on file to calculate BMI.    I reviewed the patient's chief complaint, history of present illness, review of systems, past medical history, surgical history, family history, social history, medications, and allergy list.     REVIEW OF SYSTEMS    Constitutional: Denies fevers, chills, weight loss  Cardiovascular: Denies chest pain, shortness of breath  Skin: Denies rashes, acute skin changes  Neurologic: Denies headache, loss of consciousness  MSK: Left elbow pain, left wrist pain.     Ortho Exam  Left upper extremity: Skin is warm, dry, and intact.  No tenderness to palpation.  Full flexion and extension of the wrist and elbow.  Full pronation and supination.  Patient is able to form a full fist.  Good thumb opposition.  Sensation  intact light touch.  Distal neurovascular intact.          Imaging Results (Most Recent)       None                Assessment and Plan   Diagnoses and all orders for this visit:    1. Left wrist pain (Primary)    2. Left wrist injury, initial encounter       Patient with continued improvement.  Continue PT to completion to progress strength and ROM.  Advised to continue home exercises. Light duty work restriction written for her today. Continue icing/elevation for pain/swelling and tylenol/ibuprofen as needed.     Follow up in 6-8 weeks for reevaluation.     Tobacco Use: Low Risk  (11/21/2023)    Patient History     Smoking Tobacco Use: Never     Smokeless Tobacco Use: Never     Passive Exposure: Never     Patient reports that they are a nonsmoker; cessation education not applicable.       Follow Up   Return in about 6 weeks (around 1/2/2024).  There are no Patient Instructions on file for this visit.  Patient was given instructions and counseling regarding her condition or for health maintenance advice. Please see specific information pulled into the AVS if appropriate.

## 2023-11-21 NOTE — PROGRESS NOTES
Occupational Therapy Daily Treatment Note      Patient: Jessie Schultz   : 1995  Referring practitioner: SANDRINE Lopez  Date of Initial Visit: Type: THERAPY  Noted: 10/3/2023  Today's Date: 2023  Patient seen for 13 sessions    ICD-10-CM ICD-9-CM   1. Left wrist pain  M25.532 719.43   2. Stiffness of left wrist joint  M25.632 719.53   3. Sprain of left wrist, initial encounter  S63.502A 842.00   4. Sprain of left wrist, subsequent encounter  S63.502D V58.89     842.00          Jessie Schultz reports Update restrictions to #15 at work, continue with therapy.  No pain today.         Objective   See Exercise, Manual, and Modality Logs for complete treatment.   Progressed patient strengthening with fair tolerance this date.     Assessment/Plan  Increased pushing and pulling this date, able to calm with sensory re-ed.      Cont per POC           Timed:  Manual Therapy:    0     mins  11828;  Therapeutic Exercise:    10     mins  85600;  Therapeutic Activity:    10     mins  51891;     Neuromuscular Lnida:    0    mins  72064;    Ultrasound:     0     mins  31178;    Electrical Stimulation:    0     mins  79506;    Untimed:  Electrical Stimulation:    0     mins  18228 ( );  Fluidotherapy:        0    mins  03657  Dry Needlin    mins      Timed Treatment:   30   mins   Total Treatment:     30   mins    OT SIGNATURE: VIJAY Starr, OTR/L, CHT     Electronically signed    [place] LICENSE: [id number]

## 2023-11-27 ENCOUNTER — TREATMENT (OUTPATIENT)
Dept: PHYSICAL THERAPY | Facility: CLINIC | Age: 28
End: 2023-11-27
Payer: OTHER MISCELLANEOUS

## 2023-11-27 DIAGNOSIS — S63.502D SPRAIN OF LEFT WRIST, SUBSEQUENT ENCOUNTER: ICD-10-CM

## 2023-11-27 DIAGNOSIS — S63.502A SPRAIN OF LEFT WRIST, INITIAL ENCOUNTER: ICD-10-CM

## 2023-11-27 DIAGNOSIS — M25.532 LEFT WRIST PAIN: Primary | ICD-10-CM

## 2023-11-27 DIAGNOSIS — M25.632 STIFFNESS OF LEFT WRIST JOINT: ICD-10-CM

## 2023-11-27 PROCEDURE — 97110 THERAPEUTIC EXERCISES: CPT | Performed by: OCCUPATIONAL THERAPIST

## 2023-11-27 PROCEDURE — 97112 NEUROMUSCULAR REEDUCATION: CPT | Performed by: OCCUPATIONAL THERAPIST

## 2023-11-27 PROCEDURE — 97530 THERAPEUTIC ACTIVITIES: CPT | Performed by: OCCUPATIONAL THERAPIST

## 2023-11-27 NOTE — PROGRESS NOTES
Occupational Therapy Daily Treatment Note      Patient: Jessie Schultz   : 1995  Referring practitioner: SANDRINE Lopez  Date of Initial Visit: Type: THERAPY  Noted: 10/3/2023  Today's Date: 2023  Patient seen for 14 sessions    ICD-10-CM ICD-9-CM   1. Left wrist pain  M25.532 719.43   2. Stiffness of left wrist joint  M25.632 719.53   3. Sprain of left wrist, initial encounter  S63.502A 842.00   4. Sprain of left wrist, subsequent encounter  S63.502D V58.89     842.00          Jessie Schultz reports I used it more than I normally do, working with it.  I am having 4-4.5/10 pain.  Has not resumed updated restrictions at work.           Objective   See Exercise, Manual, and Modality Logs for complete treatment.   Pt tolerated increased resistance this date.     Assessment/Plan  Progressing as tolerated with strengthening    Cont per POC           Timed:  Manual Therapy:    0     mins  25035;  Therapeutic Exercise:    10     mins  73776;  Therapeutic Activity:    10     mins  11417;     Neuromuscular Linda:    10    mins  59754;    Ultrasound:     0     mins  90144;    Electrical Stimulation:    0     mins  25663;    Untimed:  Electrical Stimulation:    0     mins  83427 ( );  Fluidotherapy:        0    mins  21049  Dry Needlin    mins      Timed Treatment:   30   mins   Total Treatment:     30   mins    OT SIGNATURE: VIJAY Starr, OTR/L, CHT     Electronically signed    [place] LICENSE: [id number]

## 2023-11-29 ENCOUNTER — TREATMENT (OUTPATIENT)
Dept: PHYSICAL THERAPY | Facility: CLINIC | Age: 28
End: 2023-11-29
Payer: OTHER MISCELLANEOUS

## 2023-11-29 DIAGNOSIS — S63.502A SPRAIN OF LEFT WRIST, INITIAL ENCOUNTER: ICD-10-CM

## 2023-11-29 DIAGNOSIS — S63.502D SPRAIN OF LEFT WRIST, SUBSEQUENT ENCOUNTER: ICD-10-CM

## 2023-11-29 DIAGNOSIS — S69.92XA LEFT WRIST INJURY, INITIAL ENCOUNTER: ICD-10-CM

## 2023-11-29 DIAGNOSIS — M25.532 LEFT WRIST PAIN: Primary | ICD-10-CM

## 2023-11-29 DIAGNOSIS — M25.632 STIFFNESS OF LEFT WRIST JOINT: ICD-10-CM

## 2023-11-29 NOTE — PROGRESS NOTES
Occupational Therapy Daily Treatment Note      Patient: Jessie Schultz   : 1995  Referring practitioner: Bill Jones MD  Date of Initial Visit: Type: THERAPY  Noted: 10/3/2023  Today's Date: 2023  Patient seen for 15 sessions    ICD-10-CM ICD-9-CM   1. Left wrist pain  M25.532 719.43   2. Stiffness of left wrist joint  M25.632 719.53   3. Sprain of left wrist, initial encounter  S63.502A 842.00   4. Sprain of left wrist, subsequent encounter  S63.502D V58.89     842.00          Jessie Schultz reports I pushed a box at work that was heavier than before and my arm had shooting pains in it.     Objective   See Exercise, Manual, and Modality Logs for complete treatment.   Progressed patient with functional use of L UE.    Assessment/Plan    Added ulnar nerve glide to HEP    Cont per POC           Timed:  Manual Therapy:    0     mins  12124;  Therapeutic Exercise:    10     mins  85594;  Therapeutic Activity:    10     mins  25648;     Neuromuscular Linda:    10    mins  17840;    Ultrasound:     0     mins  77580;    Electrical Stimulation:    0     mins  84798;    Untimed:  Electrical Stimulation:    0     mins  06721 ( );  Fluidotherapy:        00    mins  67584  Dry Needlin    mins  73225    Timed Treatment:   30   mins   Total Treatment:     30   mins    OT SIGNATURE: VIJAY Starr, ELVISR/L, CHT     Electronically signed    [place] LICENSE: [id number]

## 2023-12-13 ENCOUNTER — TELEPHONE (OUTPATIENT)
Dept: ORTHOPEDIC SURGERY | Facility: CLINIC | Age: 28
End: 2023-12-13
Payer: COMMERCIAL

## 2023-12-13 NOTE — TELEPHONE ENCOUNTER
Relationship: [facility] INSURANCE     Best call back number: [phone]     What form or medical record are you requesting: PROGNOTES 10/31/23, 11/21/23     How would you like to receive the form or medical records (pick-up, mail, fax): FAX  If fax, what is the fax number: [phone]

## 2023-12-19 ENCOUNTER — OFFICE VISIT (OUTPATIENT)
Dept: ORTHOPEDIC SURGERY | Facility: CLINIC | Age: 28
End: 2023-12-19
Payer: OTHER MISCELLANEOUS

## 2023-12-19 VITALS
DIASTOLIC BLOOD PRESSURE: 84 MMHG | OXYGEN SATURATION: 96 % | HEART RATE: 96 BPM | BODY MASS INDEX: 35.42 KG/M2 | WEIGHT: 233.69 LBS | HEIGHT: 68 IN | SYSTOLIC BLOOD PRESSURE: 143 MMHG

## 2023-12-19 DIAGNOSIS — M25.532 LEFT WRIST PAIN: Primary | ICD-10-CM

## 2023-12-19 NOTE — PATIENT INSTRUCTIONS
Patient felt physical therapy was providing some improvement for her, however, unfortunately Worker's Comp has denied additional PT. Patient with continued pain limiting ADLs and return to full duty work. Will proceed with MRI L wrist. Follow up with results. Call with changes or concerns.    Telephone Encounter by Madalyn Alonso at 03/02/17 04:55 PM     Author:  Madalyn Alonso Service:  (none) Author Type:       Filed:  03/02/17 04:55 PM Encounter Date:  3/2/2017 Status:  Signed     :  Madalyn Alonso ()            Patient notified.Rimma verbalized understanding of information given.[KD1.1T]      Revision History        User Key Date/Time User Provider Type Action    > KD1.1 03/02/17 04:55 PM Madalyn Alonso  Sign    T - Template

## 2023-12-19 NOTE — PROGRESS NOTES
"Chief Complaint  Follow-up of the Left Wrist    Subjective      Jessie Schultz presents to [facility] for follow-up of left wrist injury sustained in fall at work on 8/24/2023.  She previously received prescription for Medrol Dosepak and referral to physical therapy.  She presents today for follow-up stating that she has been out of physical therapy since the end of November as per Worker's Comp.  She reports persistent left wrist pain.  She reports she has tolerated light duty work restrictions well, although does not feel able to progress these at this time.  She does report continued difficulties and pain with  strength.    Objective   Allergies   Allergen Reactions    Sulfa Antibiotics Hives    Sulfacetamide Sodium Hives    Sulfamethoxazole Hives    Sulfamethoxazole-Trimethoprim Hives    Sulfasalazine Hives    Sulfonylureas Hives       Vital Signs:   /84   Pulse 96   Ht 171.5 cm (67.5\")   Wt 106 kg (233 lb 11 oz)   SpO2 96%   BMI 36.06 kg/m²       Physical Exam    Constitutional: Awake, alert. Well nourished appearance.    Integumentary: Warm, dry, intact. No obvious rashes.    HENT: Atraumatic, normocephalic.   Respiratory: Non labored respirations .   Cardiovascular: Intact peripheral pulses.    Psychiatric: Normal mood and affect. A&O X3    Ortho Exam  Left wrist: Skin is warm, dry, and intact.  No tenderness to palpation overlying scaphoid.  Pain reported with wrist ROM in all planes.  She is able to form a full fist.  Good thumb opposition.  Sensation and distal neurovascular intact.    Imaging Results (Most Recent)       None                    Assessment and Plan   Problem List Items Addressed This Visit    None  Visit Diagnoses       Left wrist pain    -  Primary    Relevant Orders    MRI Wrist Left Without Contrast    Ambulatory Referral to Occupational Therapy          Follow Up   Return if symptoms worsen or fail to improve.    Tobacco Use: Low Risk  " (12/19/2023)    Patient History     Smoking Tobacco Use: Never     Smokeless Tobacco Use: Never     Passive Exposure: Never       Educated on risk of smoking. Discussed options for smoking cessation.    Patient Instructions   Patient felt physical therapy was providing some improvement for her, however, unfortunately Worker's Comp has denied additional PT. Patient with continued pain limiting ADLs and return to full duty work. Will proceed with MRI L wrist. Follow up with results. Call with changes or concerns.   Patient was given instructions and counseling regarding her condition or for health maintenance advice. Please see specific information pulled into the AVS if appropriate.

## 2023-12-27 ENCOUNTER — TELEPHONE (OUTPATIENT)
Dept: ORTHOPEDIC SURGERY | Facility: CLINIC | Age: 28
End: 2023-12-27
Payer: OTHER MISCELLANEOUS

## 2023-12-27 NOTE — TELEPHONE ENCOUNTER
Caller: MATT    Relationship: WORK COMP     Best call back number:     What form or medical record are you requesting: PROGRESS NOTES AND WORK STATUS FROM 12/19/23 AND IF PT WAS RELEASED FROM CARE    Who is requesting this form or medical record from you: WORK COMP    How would you like to receive the form or medical records (pick-up, mail, fax): FAX  If fax, what is the fax number:     Timeframe paperwork needed: ASAP

## 2024-01-15 ENCOUNTER — DOCUMENTATION (OUTPATIENT)
Dept: PHYSICAL THERAPY | Facility: CLINIC | Age: 29
End: 2024-01-15
Payer: OTHER MISCELLANEOUS

## 2024-01-15 DIAGNOSIS — G56.03 BILATERAL CARPAL TUNNEL SYNDROME: Primary | ICD-10-CM

## 2024-01-15 NOTE — PROGRESS NOTES
Discharge Summary  Discharge Summary from Occupational Therapy   [address] [place] [id number]    Patient Information  Jessie Schultz  1995    Dates OT visit: 10/3/23-11/29/23  Number of Visits: 15     Discharge Status of Patient: See MD Note dated 11/29/23    Goals: Partially Met    Visit Diagnoses:    ICD-10-CM ICD-9-CM   1. Bilateral carpal tunnel syndrome  G56.03 354.0       Discharge Plan: Continue with current home exercise program as instructed    Comments insurance did not approve continued care    Date of Discharge 1/15/24        VIJAY Starr, OTR/L, CHT  Occupational Therapist, Certified Hand therapist    Electronically Signed   [place] LICENSE: [id number]

## 2024-05-12 ENCOUNTER — HOSPITAL ENCOUNTER (EMERGENCY)
Facility: HOSPITAL | Age: 29
Discharge: HOME | End: 2024-05-12
Attending: EMERGENCY MEDICINE | Admitting: EMERGENCY MEDICINE

## 2024-05-12 ENCOUNTER — APPOINTMENT (OUTPATIENT)
Dept: CT IMAGING | Facility: HOSPITAL | Age: 29
End: 2024-05-12

## 2024-05-12 VITALS
TEMPERATURE: 97.9 F | HEART RATE: 80 BPM | RESPIRATION RATE: 18 BRPM | WEIGHT: 260.8 LBS | BODY MASS INDEX: 39.53 KG/M2 | HEIGHT: 68 IN | SYSTOLIC BLOOD PRESSURE: 113 MMHG | DIASTOLIC BLOOD PRESSURE: 72 MMHG | OXYGEN SATURATION: 96 %

## 2024-05-12 DIAGNOSIS — A08.4 VIRAL GASTROENTERITIS: Primary | ICD-10-CM

## 2024-05-12 LAB
ALBUMIN SERPL-MCNC: 4.4 G/DL (ref 3.5–5.2)
ALBUMIN/GLOB SERPL: 1.5 G/DL
ALP SERPL-CCNC: 67 U/L (ref 39–117)
ALT SERPL W P-5'-P-CCNC: 11 U/L (ref 1–33)
ANION GAP SERPL CALCULATED.3IONS-SCNC: 9.9 MMOL/L (ref 5–15)
AST SERPL-CCNC: 11 U/L (ref 1–32)
BACTERIA UR QL AUTO: ABNORMAL /HPF
BASOPHILS # BLD AUTO: 0.04 10*3/MM3 (ref 0–0.2)
BASOPHILS NFR BLD AUTO: 0.3 % (ref 0–1.5)
BILIRUB SERPL-MCNC: 0.2 MG/DL (ref 0–1.2)
BILIRUB UR QL STRIP: NEGATIVE
BUN SERPL-MCNC: 12 MG/DL (ref 6–20)
BUN/CREAT SERPL: 20.7 (ref 7–25)
CALCIUM SPEC-SCNC: 9.8 MG/DL (ref 8.6–10.5)
CHLORIDE SERPL-SCNC: 105 MMOL/L (ref 98–107)
CLARITY UR: ABNORMAL
CO2 SERPL-SCNC: 27.1 MMOL/L (ref 22–29)
COLOR UR: ABNORMAL
CREAT SERPL-MCNC: 0.58 MG/DL (ref 0.57–1)
DEPRECATED RDW RBC AUTO: 38.7 FL (ref 37–54)
EGFRCR SERPLBLD CKD-EPI 2021: 126.6 ML/MIN/1.73
EOSINOPHIL # BLD AUTO: 0.13 10*3/MM3 (ref 0–0.4)
EOSINOPHIL NFR BLD AUTO: 0.9 % (ref 0.3–6.2)
ERYTHROCYTE [DISTWIDTH] IN BLOOD BY AUTOMATED COUNT: 12.1 % (ref 12.3–15.4)
GLOBULIN UR ELPH-MCNC: 3 GM/DL
GLUCOSE SERPL-MCNC: 111 MG/DL (ref 65–99)
GLUCOSE UR STRIP-MCNC: NEGATIVE MG/DL
HCG INTACT+B SERPL-ACNC: <0.5 MIU/ML
HCT VFR BLD AUTO: 40.7 % (ref 34–46.6)
HGB BLD-MCNC: 13.8 G/DL (ref 12–15.9)
HGB UR QL STRIP.AUTO: NEGATIVE
HOLD SPECIMEN: NORMAL
HOLD SPECIMEN: NORMAL
HYALINE CASTS UR QL AUTO: ABNORMAL /LPF
IMM GRANULOCYTES # BLD AUTO: 0.06 10*3/MM3 (ref 0–0.05)
IMM GRANULOCYTES NFR BLD AUTO: 0.4 % (ref 0–0.5)
KETONES UR QL STRIP: NEGATIVE
LEUKOCYTE ESTERASE UR QL STRIP.AUTO: NEGATIVE
LIPASE SERPL-CCNC: 25 U/L (ref 13–60)
LYMPHOCYTES # BLD AUTO: 1.13 10*3/MM3 (ref 0.7–3.1)
LYMPHOCYTES NFR BLD AUTO: 7.5 % (ref 19.6–45.3)
MCH RBC QN AUTO: 29.6 PG (ref 26.6–33)
MCHC RBC AUTO-ENTMCNC: 33.9 G/DL (ref 31.5–35.7)
MCV RBC AUTO: 87.2 FL (ref 79–97)
MONOCYTES # BLD AUTO: 0.53 10*3/MM3 (ref 0.1–0.9)
MONOCYTES NFR BLD AUTO: 3.5 % (ref 5–12)
NEUTROPHILS NFR BLD AUTO: 13.23 10*3/MM3 (ref 1.7–7)
NEUTROPHILS NFR BLD AUTO: 87.4 % (ref 42.7–76)
NITRITE UR QL STRIP: NEGATIVE
NRBC BLD AUTO-RTO: 0 /100 WBC (ref 0–0.2)
PH UR STRIP.AUTO: <=5 [PH] (ref 5–8)
PLATELET # BLD AUTO: 329 10*3/MM3 (ref 140–450)
PMV BLD AUTO: 9.1 FL (ref 6–12)
POTASSIUM SERPL-SCNC: 3.9 MMOL/L (ref 3.5–5.2)
PROT SERPL-MCNC: 7.4 G/DL (ref 6–8.5)
PROT UR QL STRIP: ABNORMAL
RBC # BLD AUTO: 4.67 10*6/MM3 (ref 3.77–5.28)
RBC # UR STRIP: ABNORMAL /HPF
REF LAB TEST METHOD: ABNORMAL
SODIUM SERPL-SCNC: 142 MMOL/L (ref 136–145)
SP GR UR STRIP: 1.03 (ref 1–1.03)
SQUAMOUS #/AREA URNS HPF: ABNORMAL /HPF
UROBILINOGEN UR QL STRIP: ABNORMAL
WBC # UR STRIP: ABNORMAL /HPF
WBC NRBC COR # BLD AUTO: 15.12 10*3/MM3 (ref 3.4–10.8)
WHOLE BLOOD HOLD COAG: NORMAL
WHOLE BLOOD HOLD SPECIMEN: NORMAL

## 2024-05-12 PROCEDURE — 74177 CT ABD & PELVIS W/CONTRAST: CPT

## 2024-05-12 PROCEDURE — 96375 TX/PRO/DX INJ NEW DRUG ADDON: CPT

## 2024-05-12 PROCEDURE — 25010000002 ONDANSETRON PER 1 MG

## 2024-05-12 PROCEDURE — 84702 CHORIONIC GONADOTROPIN TEST: CPT | Performed by: EMERGENCY MEDICINE

## 2024-05-12 PROCEDURE — 85025 COMPLETE CBC W/AUTO DIFF WBC: CPT

## 2024-05-12 PROCEDURE — 80053 COMPREHEN METABOLIC PANEL: CPT | Performed by: EMERGENCY MEDICINE

## 2024-05-12 PROCEDURE — 83690 ASSAY OF LIPASE: CPT | Performed by: EMERGENCY MEDICINE

## 2024-05-12 PROCEDURE — 96374 THER/PROPH/DIAG INJ IV PUSH: CPT

## 2024-05-12 PROCEDURE — 25810000003 SODIUM CHLORIDE 0.9 % SOLUTION

## 2024-05-12 PROCEDURE — 99285 EMERGENCY DEPT VISIT HI MDM: CPT

## 2024-05-12 PROCEDURE — 81001 URINALYSIS AUTO W/SCOPE: CPT | Performed by: EMERGENCY MEDICINE

## 2024-05-12 PROCEDURE — 25510000001 IOPAMIDOL PER 1 ML: Performed by: EMERGENCY MEDICINE

## 2024-05-12 PROCEDURE — 25010000002 KETOROLAC TROMETHAMINE PER 15 MG

## 2024-05-12 RX ORDER — SODIUM CHLORIDE 0.9 % (FLUSH) 0.9 %
10 SYRINGE (ML) INJECTION AS NEEDED
Status: DISCONTINUED | OUTPATIENT
Start: 2024-05-12 | End: 2024-05-12 | Stop reason: HOSPADM

## 2024-05-12 RX ORDER — ONDANSETRON 2 MG/ML
4 INJECTION INTRAMUSCULAR; INTRAVENOUS ONCE
Status: COMPLETED | OUTPATIENT
Start: 2024-05-12 | End: 2024-05-12

## 2024-05-12 RX ORDER — KETOROLAC TROMETHAMINE 30 MG/ML
30 INJECTION, SOLUTION INTRAMUSCULAR; INTRAVENOUS ONCE
Status: COMPLETED | OUTPATIENT
Start: 2024-05-12 | End: 2024-05-12

## 2024-05-12 RX ORDER — ONDANSETRON 4 MG/1
4 TABLET, ORALLY DISINTEGRATING ORAL EVERY 8 HOURS PRN
Qty: 15 TABLET | Refills: 0 | Status: SHIPPED | OUTPATIENT
Start: 2024-05-12 | End: 2024-05-17

## 2024-05-12 RX ADMIN — ONDANSETRON 4 MG: 2 INJECTION INTRAMUSCULAR; INTRAVENOUS at 08:45

## 2024-05-12 RX ADMIN — KETOROLAC TROMETHAMINE 30 MG: 30 INJECTION, SOLUTION INTRAMUSCULAR; INTRAVENOUS at 08:45

## 2024-05-12 RX ADMIN — SODIUM CHLORIDE 1000 ML: 9 INJECTION, SOLUTION INTRAVENOUS at 08:45

## 2024-05-12 RX ADMIN — IOPAMIDOL 100 ML: 755 INJECTION, SOLUTION INTRAVENOUS at 09:22

## 2024-05-12 NOTE — DISCHARGE INSTRUCTIONS
Home to rest.  Increase your fluid intake.  Eat a bland diet and advance as tolerated.  Avoid greasy, fatty, fried, spicy foods until nausea and vomiting have resolved.  A prescription for Zofran has been sent to your pharmacy.  Please  and take as directed.    As discussed in your discharge counseling call your primary care provider and follow-up with them within the next week to discuss scheduling repeat abdominal/pelvis CT in 3 to 6 months as noted on your CT report from the ED. also schedule a CT in 12 months to follow-up for the lung nodules noted as an incidental finding on your CT.    Symptoms worsen or change in presentation return to the ED.

## 2024-05-12 NOTE — ED PROVIDER NOTES
Time: 8:02 AM EDT  Date of encounter:  5/12/2024  Independent Historian/Clinical History and Information was obtained by:   Patient    History is limited by: N/A    Chief Complaint   Patient presents with    Nausea    Vomiting    Abdominal Pain         History of Present Illness:  Patient is a 28 y.o. year old female who presents to the emergency department for evaluation of abdominal pain.  Patient reports she has been experiencing nausea vomiting and diarrhea since around midnight.  She reports her pain is intermittent and feels like a squeezing sensation.  Denies fever.    Patient Care Team  Primary Care Provider: Ai Arrington APRN    Past Medical History:     Allergies   Allergen Reactions    Sulfa Antibiotics Hives    Sulfacetamide Sodium Hives    Sulfamethoxazole Hives    Sulfamethoxazole-Trimethoprim Hives    Sulfasalazine Hives    Sulfonylureas Hives     Past Medical History:   Diagnosis Date    Allergies     Carpal tunnel syndrome     LEFT    Hyperlipidemia     Prediabetes      Past Surgical History:   Procedure Laterality Date    ADENOIDECTOMY      CARPAL TUNNEL RELEASE Right 2016    CARPAL TUNNEL RELEASE Left 7/19/2023    Procedure: CARPAL TUNNEL RELEASE;  Surgeon: Bill Jones MD;  Location: [facility] [place];  Service: Orthopedics;  Laterality: Left;    TONSILLECTOMY  2012    TYMPANOSTOMY TUBE PLACEMENT      x2     Family History   Problem Relation Age of Onset    Hypertension Mother     Diabetes Mother     Hypertension Father     Diabetes Father     Hypertension Brother     Diabetes Maternal Grandmother     Diabetes Paternal Grandfather     Malig Hyperthermia Neg Hx        Home Medications:  Prior to Admission medications    Medication Sig Start Date End Date Taking? Authorizing Provider   acetaminophen (TYLENOL) 500 MG tablet Take 1 tablet by mouth Every 6 (Six) Hours As Needed for Mild Pain.    Provider, MD Laura   brompheniramine-pseudoephedrine-DM 30-2-10 MG/5ML syrup Take 10 mL by  "mouth 4 (Four) Times a Day As Needed for Cough or Congestion. 12/1/23   Chaya Andrews APRN   fluticasone (FLONASE) 50 MCG/ACT nasal spray 2 sprays into the nostril(s) as directed by provider Daily for 30 days. 6/21/23 12/1/23  Lesa Britton APRN        Social History:   Social History     Tobacco Use    Smoking status: Never     Passive exposure: Never    Smokeless tobacco: Never   Vaping Use    Vaping status: Every Day    Substances: Flavoring    Devices: "[employer]" tank   Substance Use Topics    Alcohol use: Not Currently    Drug use: Never         Review of Systems:  Review of Systems   Constitutional: Negative.    HENT: Negative.     Eyes: Negative.    Respiratory: Negative.     Cardiovascular: Negative.    Gastrointestinal:  Positive for abdominal pain, diarrhea, nausea and vomiting.   Endocrine: Negative.    Genitourinary: Negative.    Musculoskeletal: Negative.    Skin: Negative.    Allergic/Immunologic: Negative.    Neurological: Negative.    Hematological: Negative.    Psychiatric/Behavioral: Negative.          Physical Exam:  /72   Pulse 80   Temp 97.9 °F (36.6 °C) (Oral)   Resp 18   Ht 172.7 cm (68\")   Wt 118 kg (260 lb 12.9 oz)   SpO2 96%   BMI 39.66 kg/m²         Physical Exam  Vitals and nursing note reviewed.   Constitutional:       General: She is not in acute distress.     Appearance: Normal appearance. She is not toxic-appearing.   HENT:      Head: Normocephalic and atraumatic.      Jaw: There is normal jaw occlusion.      Nose: Nose normal.      Mouth/Throat:      Mouth: Mucous membranes are moist.   Eyes:      General: Lids are normal.      Extraocular Movements: Extraocular movements intact.      Conjunctiva/sclera: Conjunctivae normal.      Pupils: Pupils are equal, round, and reactive to light.   Cardiovascular:      Rate and Rhythm: Normal rate and regular rhythm.      Pulses: Normal pulses.      Heart sounds: Normal heart sounds.   Pulmonary:      Effort: Pulmonary " effort is normal. No respiratory distress.      Breath sounds: Normal breath sounds. No wheezing or rhonchi.   Abdominal:      General: Abdomen is flat. Bowel sounds are normal.      Palpations: Abdomen is soft.      Tenderness: There is generalized abdominal tenderness. There is no right CVA tenderness, left CVA tenderness, guarding or rebound.   Musculoskeletal:         General: Normal range of motion.      Cervical back: Normal range of motion and neck supple.      Right lower leg: No edema.      Left lower leg: No edema.   Skin:     General: Skin is warm and dry.   Neurological:      Mental Status: She is alert and oriented to person, place, and time. Mental status is at baseline.   Psychiatric:         Mood and Affect: Mood normal.               Procedures:  Procedures      Medical Decision Making:      Comorbidities that affect care:    Hyperlipidemia    External Notes reviewed:    Previous Clinic Note: Patient was last seen by Mayte López PA-C for left wrist pain on 12/19/2023.      The following orders were placed and all results were independently analyzed by me:  Orders Placed This Encounter   Procedures    CT Abdomen Pelvis With Contrast    [place] Draw    Comprehensive Metabolic Panel    Lipase    Urinalysis With Microscopic If Indicated (No Culture) - Urine, Clean Catch    hCG, Quantitative, Pregnancy    CBC Auto Differential    Urinalysis, Microscopic Only - Urine, Clean Catch    NPO Diet NPO Type: Strict NPO    Undress & Gown    Insert Peripheral IV    CBC & Differential    Green Top (Gel)    Lavender Top    Gold Top - SST    Light Blue Top       Medications Given in the Emergency Department:  Medications   sodium chloride 0.9 % flush 10 mL (has no administration in time range)   sodium chloride 0.9 % bolus 1,000 mL (0 mL Intravenous Stopped 5/12/24 1116)   ketorolac (TORADOL) injection 30 mg (30 mg Intravenous Given 5/12/24 0845)   ondansetron (ZOFRAN) injection 4 mg (4 mg Intravenous Given 5/12/24  0845)   iopamidol (ISOVUE-370) 76 % injection 100 mL (100 mL Intravenous Given 5/12/24 0922)        ED Course:    The patient was initially evaluated in the triage area where orders were placed. The patient was later dispositioned by MARIA E Jorge.      The patient was advised to stay for completion of workup which includes but is not limited to communication of labs and radiological results, reassessment and plan. The patient was advised that leaving prior to disposition by a provider could result in critical findings that are not communicated to the patient.          Labs:    Lab Results (last 24 hours)       Procedure Component Value Units Date/Time    CBC & Differential [[id number]]  (Abnormal) Collected: 05/12/24 0737    Specimen: Blood Updated: 05/12/24 0745    Narrative:      The following orders were created for panel order CBC & Differential.  Procedure                               Abnormality         Status                     ---------                               -----------         ------                     CBC Auto Differential[[id number]]        Abnormal            Final result                 Please view results for these tests on the individual orders.    Comprehensive Metabolic Panel [[id number]]  (Abnormal) Collected: 05/12/24 0737    Specimen: Blood Updated: 05/12/24 0807     Glucose 111 mg/dL      BUN 12 mg/dL      Creatinine 0.58 mg/dL      Sodium 142 mmol/L      Potassium 3.9 mmol/L      Chloride 105 mmol/L      CO2 27.1 mmol/L      Calcium 9.8 mg/dL      Total Protein 7.4 g/dL      Albumin 4.4 g/dL      ALT (SGPT) 11 U/L      AST (SGOT) 11 U/L      Alkaline Phosphatase 67 U/L      Total Bilirubin 0.2 mg/dL      Globulin 3.0 gm/dL      A/G Ratio 1.5 g/dL      BUN/Creatinine Ratio 20.7     Anion Gap 9.9 mmol/L      eGFR 126.6 mL/min/1.73     Narrative:      GFR Normal >60  Chronic Kidney Disease <60  Kidney Failure <15      Lipase [[id number]]  (Normal) Collected: 05/12/24 0737     Specimen: Blood Updated: 05/12/24 0807     Lipase 25 U/L     hCG, Quantitative, Pregnancy [[id number]] Collected: 05/12/24 0737    Specimen: Blood Updated: 05/12/24 0802     HCG Quantitative <0.50 mIU/mL     Narrative:      HCG Ranges by Gestational Age    Females - non-pregnant premenopausal   </= 1mIU/mL HCG  Females - postmenopausal               </= 7mIU/mL HCG    3 Weeks       5.4   -      72 mIU/mL  4 Weeks      10.2   -     708 mIU/mL  5 Weeks       217   -   8,245 mIU/mL  6 Weeks       152   -  32,177 mIU/mL  7 Weeks     4,059   - 153,767 mIU/mL  8 Weeks    31,366   - 149,094 mIU/mL  9 Weeks    59,109   - 135,901 mIU/mL  10 Weeks   44,186   - 170,409 mIU/mL  12 Weeks   27,107   - 201,615 mIU/mL  14 Weeks   24,302   -  93,646 mIU/mL  15 Weeks   12,540   -  69,747 mIU/mL  16 Weeks    8,904   -  55,332 mIU/mL  17 Weeks    8,240   -  51,793 mIU/mL  18 Weeks    9,649   -  55,271 mIU/mL      CBC Auto Differential [[id number]]  (Abnormal) Collected: 05/12/24 0737    Specimen: Blood Updated: 05/12/24 0745     WBC 15.12 10*3/mm3      RBC 4.67 10*6/mm3      Hemoglobin 13.8 g/dL      Hematocrit 40.7 %      MCV 87.2 fL      MCH 29.6 pg      MCHC 33.9 g/dL      RDW 12.1 %      RDW-SD 38.7 fl      MPV 9.1 fL      Platelets 329 10*3/mm3      Neutrophil % 87.4 %      Lymphocyte % 7.5 %      Monocyte % 3.5 %      Eosinophil % 0.9 %      Basophil % 0.3 %      Immature Grans % 0.4 %      Neutrophils, Absolute 13.23 10*3/mm3      Lymphocytes, Absolute 1.13 10*3/mm3      Monocytes, Absolute 0.53 10*3/mm3      Eosinophils, Absolute 0.13 10*3/mm3      Basophils, Absolute 0.04 10*3/mm3      Immature Grans, Absolute 0.06 10*3/mm3      nRBC 0.0 /100 WBC     Urinalysis With Microscopic If Indicated (No Culture) - Urine, Clean Catch [[id number]]  (Abnormal) Collected: 05/12/24 0849    Specimen: Urine, Clean Catch Updated: 05/12/24 0903     Color, UA Dark Yellow     Appearance, UA Turbid     pH, UA <=5.0     Specific Gravity, UA 1.027      Glucose, UA Negative     Ketones, UA Negative     Bilirubin, UA Negative     Blood, UA Negative     Protein, UA 30 mg/dL (1+)     Leuk Esterase, UA Negative     Nitrite, UA Negative     Urobilinogen, UA 0.2 E.U./dL    Urinalysis, Microscopic Only - Urine, Clean Catch [[id number]]  (Abnormal) Collected: 05/12/24 0849    Specimen: Urine, Clean Catch Updated: 05/12/24 0903     RBC, UA 0-2 /HPF      WBC, UA 3-5 /HPF      Bacteria, UA 3+ /HPF      Squamous Epithelial Cells, UA 7-12 /HPF      Hyaline Casts, UA 7-12 /LPF      Methodology Automated Microscopy             Imaging:    CT Abdomen Pelvis With Contrast    Result Date: 5/12/2024  CT ABDOMEN PELVIS W CONTRAST-  Date of Exam: 5/12/2024 9:12 AM  Indication: Abdominal pain.  Comparison: None available.  Technique: Axial CT images were obtained of the abdomen and pelvis following the uneventful intravenous administration of iodinated contrast. Reconstructed coronal and sagittal images were also obtained. Automated exposure control and iterative construction methods were used.   Findings: In the right lower lobe on image 5 is a subpleural 0.4 cm nodule. 0.3 cm nodule in the right lower lobe on image 13 is noted. Small nodular/irregular densities are noted in the posterior left costophrenic angle measuring up to 0.4 cm.  The liver, spleen, pancreas and adrenal glands appear unremarkable. The kidneys appear normal.  Mildly prominent bilateral inguinal lymph nodes are noted measuring up to 1.0 cm in short axis on the right and a 0.9 cm in short axis on the left. There are several subcentimeter mesenteric lymph nodes noted in the left upper quadrant measuring up to 0.8 cm. Mildly enlarged right lower quadrant mesenteric lymph nodes measure 1.2 cm in short axis. No free fluid is seen in the abdomen or pelvis.  The uterus and bilateral ovaries appear normal. No acute bowel abnormality is seen. The lack of oral contrast limits evaluation of the bowel.  No focal osseous lesion  is seen.      Impression: 1.  Multiple incidentally detected pulmonary nodules less than 6 mm. If the patient is felt to be at increased risk of neoplasm, follow-up chest CT in 12 months could be performed to further evaluate. 2.  Multiple prominent lymph nodes noted, as described above. Correlate clinically for mesenteric adenitis or mesenteric panniculitis. Lymphoma can rarely present in this manner. Follow-up abdomen pelvis CT in 3 to 6 months could be considered to reevaluate.     Electronically Signed By-Shelton Linn MD On:5/12/2024 11:33 AM         Differential Diagnosis and Discussion:      Abdominal Pain: Based on the patient's signs and symptoms, I considered abdominal aortic aneurysm, small bowel obstruction, pancreatitis, acute cholecystitis, acute appendecitis, peptic ulcer disease, gastritis, colitis, endocrine disorders, irritable bowel syndrome and other differential diagnosis an etiology of the patient's abdominal pain.    All labs were reviewed and interpreted by me.  CT scan radiology impression was interpreted by me.    MDM  Number of Diagnoses or Management Options  Viral gastroenteritis  Diagnosis management comments: The patient comes to the ED for evaluation of vomiting.  The patient´s CBC that was reviewed and interpreted by me shows no abnormalities of critical concern. Of note, there is no anemia requiring a blood transfusion and the platelet count is acceptable.  The patient´s CMP that was reviewed and interpretted by me shows no abnormalities of critical concern. Of note, the patient´s sodium and potassium are acceptable. The patient´s liver enzymes are unremarkable. The patient´s renal function (creatinine) is preserved. The patient has a normal anion gap.  Emesis is much improved in the ED. The patient was given antiemetics in the ED. The patient is resting comfortably and feels better, is alert and in no distress. Repeat examination is unremarkable and benign; in particular, there's  no discomfort at McBurney's point. The history, exam, diagnostic testing, and current condition does not suggest acute appendicitis, bowel instruction, acute cholecystitis, bowel perforation, major gastrointestinal bleeding, severe diverticulitis, abdominal aortic aneurysm, mesenteric ischemia, volvulus, sepsis, or other significant pathology that warrants further testing, continued ED treatment, admission, for surgical evaluation at this point. The vital signs have been stable. Bloodwork performed shows no signs of acute renal failure. The patient does not have uncontrollable pain, intractable vomiting, or other significant symptoms. The patient is now able to tolerate po intake in the ED and has passed a po challenge. The patient's condition is stable and appropriate for discharge from the emergency department.  The patient was found to have a incidental finding of reactive lymph nodes and lung nodules on CT. The patient was notified of these findings and advised to follow up for a repeat examination and imaging. Patient understands these findings and will follow up as described in the discharge instructions.       Amount and/or Complexity of Data Reviewed  Clinical lab tests: reviewed  Decide to obtain previous medical records or to obtain history from someone other than the patient: yes         Patient Care Considerations:    SEPSIS was considered but is NOT present in the emergency department as SIRS criteria is not present. ANTIBIOTICS: I considered prescribing antibiotics as an outpatient however no bacterial focus of infection was found.      Consultants/Shared Management Plan:    None    Social Determinants of Health:    Patient is independent, reliable, and has access to care.       Disposition and Care Coordination:    Discharged: The patient is suitable and stable for discharge with no need for consideration of admission.    I have explained the patient´s condition, diagnoses and treatment plan based on  the information available to me at this time. I have answered questions and addressed any concerns. The patient has a good  understanding of the patient´s diagnosis, condition, and treatment plan as can be expected at this point. The vital signs have been stable. The patient´s condition is stable and appropriate for discharge from the emergency department.      The patient will pursue further outpatient evaluation with the primary care physician or other designated or consulting physician as outlined in the discharge instructions. They are agreeable to this plan of care and follow-up instructions have been explained in detail. The patient has received these instructions in written format and has expressed an understanding of the discharge instructions. The patient is aware that any significant change in condition or worsening of symptoms should prompt an immediate return to this or the closest emergency department or call to 1.  I have explained discharge medications and the need for follow up with the patient/caretakers. This was also printed in the discharge instructions. Patient was discharged with the following medications and follow up:      Medication List        New Prescriptions      ondansetron ODT 4 MG disintegrating tablet  Commonly known as: ZOFRAN-ODT  Take 1 tablet by mouth Every 8 (Eight) Hours As Needed for Nausea or Vomiting for up to 5 days.               Where to Get Your Medications        These medications were sent to [facility] #[id number] - [place], [place] - [address] - [phone]  - [phone]   [address] [place] [address]      Hours: 24-hours Phone: [phone]   ondansetron ODT 4 MG disintegrating tablet      Ai Arrington APRN  [address] [address] [phone]    Call   For follow-up of CT results       Final diagnoses:   Viral gastroenteritis        ED Disposition       ED Disposition   Discharge    Condition   Stable     Comment   --               This medical record created using voice recognition software.             Emily Slater, APRN  05/12/24 0967

## 2024-05-12 NOTE — Clinical Note
[facility] EMERGENCY ROOM  [address] [place] [address]  Phone: [phone]  Fax: [phone]    Jessie Schultz was seen and treated in our emergency department on 5/12/2024.  She may return to work on 05/14/2024.         Thank you for choosing [facility].    Emily Slater APRN

## 2024-09-11 ENCOUNTER — LAB (OUTPATIENT)
Dept: LAB | Facility: HOSPITAL | Age: 29
End: 2024-09-11
Payer: COMMERCIAL

## 2024-09-11 ENCOUNTER — OFFICE VISIT (OUTPATIENT)
Dept: FAMILY MEDICINE CLINIC | Facility: CLINIC | Age: 29
End: 2024-09-11
Payer: COMMERCIAL

## 2024-09-11 VITALS
OXYGEN SATURATION: 99 % | HEART RATE: 74 BPM | TEMPERATURE: 99.1 F | HEIGHT: 68 IN | BODY MASS INDEX: 39.56 KG/M2 | DIASTOLIC BLOOD PRESSURE: 76 MMHG | SYSTOLIC BLOOD PRESSURE: 132 MMHG | WEIGHT: 261 LBS

## 2024-09-11 DIAGNOSIS — Z11.59 NEED FOR HEPATITIS C SCREENING TEST: ICD-10-CM

## 2024-09-11 DIAGNOSIS — T78.40XD ALLERGY, SUBSEQUENT ENCOUNTER: ICD-10-CM

## 2024-09-11 DIAGNOSIS — Z00.00 ANNUAL PHYSICAL EXAM: Primary | ICD-10-CM

## 2024-09-11 DIAGNOSIS — Z13.29 SCREENING FOR THYROID DISORDER: ICD-10-CM

## 2024-09-11 DIAGNOSIS — Z3A.11 11 WEEKS GESTATION OF PREGNANCY: ICD-10-CM

## 2024-09-11 DIAGNOSIS — Z13.220 SCREENING FOR LIPID DISORDERS: ICD-10-CM

## 2024-09-11 DIAGNOSIS — E66.01 CLASS 3 SEVERE OBESITY WITHOUT SERIOUS COMORBIDITY WITH BODY MASS INDEX (BMI) OF 50.0 TO 59.9 IN ADULT, UNSPECIFIED OBESITY TYPE: ICD-10-CM

## 2024-09-11 DIAGNOSIS — Z00.00 ANNUAL PHYSICAL EXAM: ICD-10-CM

## 2024-09-11 LAB
ALBUMIN SERPL-MCNC: 4.1 G/DL (ref 3.5–5.2)
ALBUMIN/GLOB SERPL: 1.5 G/DL
ALP SERPL-CCNC: 52 U/L (ref 39–117)
ALT SERPL W P-5'-P-CCNC: 12 U/L (ref 1–33)
ANION GAP SERPL CALCULATED.3IONS-SCNC: 12 MMOL/L (ref 5–15)
AST SERPL-CCNC: 12 U/L (ref 1–32)
BASOPHILS # BLD AUTO: 0.02 10*3/MM3 (ref 0–0.2)
BASOPHILS NFR BLD AUTO: 0.2 % (ref 0–1.5)
BILIRUB SERPL-MCNC: 0.3 MG/DL (ref 0–1.2)
BUN SERPL-MCNC: 11 MG/DL (ref 6–20)
BUN/CREAT SERPL: 22.4 (ref 7–25)
CALCIUM SPEC-SCNC: 10 MG/DL (ref 8.6–10.5)
CHLORIDE SERPL-SCNC: 106 MMOL/L (ref 98–107)
CHOLEST SERPL-MCNC: 171 MG/DL (ref 0–200)
CO2 SERPL-SCNC: 21 MMOL/L (ref 22–29)
CREAT SERPL-MCNC: 0.49 MG/DL (ref 0.57–1)
DEPRECATED RDW RBC AUTO: 37.5 FL (ref 37–54)
EGFRCR SERPLBLD CKD-EPI 2021: 131 ML/MIN/1.73
EOSINOPHIL # BLD AUTO: 0.16 10*3/MM3 (ref 0–0.4)
EOSINOPHIL NFR BLD AUTO: 1.9 % (ref 0.3–6.2)
ERYTHROCYTE [DISTWIDTH] IN BLOOD BY AUTOMATED COUNT: 12.1 % (ref 12.3–15.4)
GLOBULIN UR ELPH-MCNC: 2.7 GM/DL
GLUCOSE SERPL-MCNC: 80 MG/DL (ref 65–99)
HCT VFR BLD AUTO: 36.9 % (ref 34–46.6)
HCV AB SER QL: NORMAL
HDLC SERPL-MCNC: 43 MG/DL (ref 40–60)
HGB BLD-MCNC: 12.7 G/DL (ref 12–15.9)
IMM GRANULOCYTES # BLD AUTO: 0.05 10*3/MM3 (ref 0–0.05)
IMM GRANULOCYTES NFR BLD AUTO: 0.6 % (ref 0–0.5)
LDLC SERPL CALC-MCNC: 110 MG/DL (ref 0–100)
LDLC/HDLC SERPL: 2.53 {RATIO}
LYMPHOCYTES # BLD AUTO: 1.87 10*3/MM3 (ref 0.7–3.1)
LYMPHOCYTES NFR BLD AUTO: 21.9 % (ref 19.6–45.3)
MCH RBC QN AUTO: 29.9 PG (ref 26.6–33)
MCHC RBC AUTO-ENTMCNC: 34.4 G/DL (ref 31.5–35.7)
MCV RBC AUTO: 86.8 FL (ref 79–97)
MONOCYTES # BLD AUTO: 0.51 10*3/MM3 (ref 0.1–0.9)
MONOCYTES NFR BLD AUTO: 6 % (ref 5–12)
NEUTROPHILS NFR BLD AUTO: 5.94 10*3/MM3 (ref 1.7–7)
NEUTROPHILS NFR BLD AUTO: 69.4 % (ref 42.7–76)
NRBC BLD AUTO-RTO: 0 /100 WBC (ref 0–0.2)
PLATELET # BLD AUTO: 248 10*3/MM3 (ref 140–450)
PMV BLD AUTO: 9.4 FL (ref 6–12)
POTASSIUM SERPL-SCNC: 4 MMOL/L (ref 3.5–5.2)
PROT SERPL-MCNC: 6.8 G/DL (ref 6–8.5)
RBC # BLD AUTO: 4.25 10*6/MM3 (ref 3.77–5.28)
SODIUM SERPL-SCNC: 139 MMOL/L (ref 136–145)
TRIGL SERPL-MCNC: 96 MG/DL (ref 0–150)
TSH SERPL DL<=0.05 MIU/L-ACNC: 1.16 UIU/ML (ref 0.27–4.2)
VLDLC SERPL-MCNC: 18 MG/DL (ref 5–40)
WBC NRBC COR # BLD AUTO: 8.55 10*3/MM3 (ref 3.4–10.8)

## 2024-09-11 PROCEDURE — 80050 GENERAL HEALTH PANEL: CPT

## 2024-09-11 PROCEDURE — 99395 PREV VISIT EST AGE 18-39: CPT

## 2024-09-11 PROCEDURE — 80061 LIPID PANEL: CPT

## 2024-09-11 PROCEDURE — 2014F MENTAL STATUS ASSESS: CPT

## 2024-09-11 PROCEDURE — 1126F AMNT PAIN NOTED NONE PRSNT: CPT

## 2024-09-11 PROCEDURE — 86803 HEPATITIS C AB TEST: CPT

## 2024-09-11 PROCEDURE — 36415 COLL VENOUS BLD VENIPUNCTURE: CPT

## 2024-09-11 RX ORDER — ECHINACEA PURPUREA EXTRACT 125 MG
1 TABLET ORAL AS NEEDED
Qty: 44 ML | Refills: 12 | Status: SHIPPED | OUTPATIENT
Start: 2024-09-11

## 2024-09-11 RX ORDER — PRENATAL VIT NO.126/IRON/FOLIC 28MG-0.8MG
TABLET ORAL DAILY
COMMUNITY

## 2024-09-11 NOTE — PROGRESS NOTES
"Chief Complaint  Annual Exam (Verified at [employer] with US on the 20th of August )    Subjective        Jesise Schultz presents to [facility]  History of Present Illness  Jessie is here today to go over medications and get labs done. She is 11 weeks 3 days pregnant and will be seeing OBGYN next Friday. She is still using flonase every once in awhile. I have advised saline nasal rinse is better. Avoid ibuprofen and use tylenol very sparingly.       Objective   Vital Signs:  /76 (BP Location: Left arm, Patient Position: Sitting, Cuff Size: Adult)   Pulse 74   Temp 99.1 °F (37.3 °C)   Ht 172.7 cm (68\")   Wt 118 kg (261 lb)   SpO2 99%   BMI 39.68 kg/m²   Estimated body mass index is 39.68 kg/m² as calculated from the following:    Height as of this encounter: 172.7 cm (68\").    Weight as of this encounter: 118 kg (261 lb).          Physical Exam  Constitutional:       Appearance: Normal appearance.   HENT:      Nose: Nose normal.      Mouth/Throat:      Mouth: Mucous membranes are moist.   Cardiovascular:      Rate and Rhythm: Normal rate and regular rhythm.      Pulses: Normal pulses.      Heart sounds: Normal heart sounds.   Pulmonary:      Effort: Pulmonary effort is normal.      Breath sounds: Normal breath sounds.   Skin:     General: Skin is warm and dry.   Neurological:      General: No focal deficit present.      Mental Status: She is alert and oriented to person, place, and time.   Psychiatric:         Mood and Affect: Mood normal.         Behavior: Behavior normal.        Result Review :                Assessment and Plan   Diagnoses and all orders for this visit:    1. Annual physical exam (Primary)  -     CBC w AUTO Differential; Future  -     Comprehensive metabolic panel; Future    2. Need for hepatitis C screening test  -     Hepatitis C Antibody; Future    3. Screening for lipid disorders  -     Lipid panel; Future    4. Screening for thyroid " disorder  -     TSH; Future    5. Class 3 severe obesity without serious comorbidity with body mass index (BMI) of 50.0 to 59.9 in adult, unspecified obesity type  Assessment & Plan:  Patient's (Body mass index is 39.68 kg/m².) indicates that they are morbidly/severely obese (BMI > 40 or > 35 with obesity - related health condition) with health conditions that include none . Weight is unchanged. BMI  is above average; BMI management plan is completed. We discussed portion control and increasing exercise.       6. 11 weeks gestation of pregnancy  Comments:  11w3d    7. Allergy, subsequent encounter  -     sodium chloride 0.65 % nasal spray; 1 spray into the nostril(s) as directed by provider As Needed for Congestion.  Dispense: 44 mL; Refill: 12             Follow Up   Return in about 3 months (around 12/11/2024), or if symptoms worsen or fail to improve.  Patient was given instructions and counseling regarding her condition or for health maintenance advice. Please see specific information pulled into the AVS if appropriate.

## 2024-09-11 NOTE — ASSESSMENT & PLAN NOTE
Patient's (Body mass index is 39.68 kg/m².) indicates that they are morbidly/severely obese (BMI > 40 or > 35 with obesity - related health condition) with health conditions that include none . Weight is unchanged. BMI  is above average; BMI management plan is completed. We discussed portion control and increasing exercise.

## 2024-09-17 ENCOUNTER — TELEPHONE (OUTPATIENT)
Dept: FAMILY MEDICINE CLINIC | Facility: CLINIC | Age: 29
End: 2024-09-17
Payer: COMMERCIAL

## 2024-09-18 ENCOUNTER — HOSPITAL ENCOUNTER (EMERGENCY)
Facility: HOSPITAL | Age: 29
Discharge: HOME | End: 2024-09-18
Attending: EMERGENCY MEDICINE | Admitting: EMERGENCY MEDICINE
Payer: COMMERCIAL

## 2024-09-18 VITALS
SYSTOLIC BLOOD PRESSURE: 125 MMHG | RESPIRATION RATE: 18 BRPM | OXYGEN SATURATION: 100 % | BODY MASS INDEX: 39.23 KG/M2 | DIASTOLIC BLOOD PRESSURE: 72 MMHG | TEMPERATURE: 98.7 F | HEART RATE: 89 BPM | WEIGHT: 258.82 LBS | HEIGHT: 68 IN

## 2024-09-18 DIAGNOSIS — J06.9 UPPER RESPIRATORY TRACT INFECTION, UNSPECIFIED TYPE: Primary | ICD-10-CM

## 2024-09-18 DIAGNOSIS — Z34.91 FIRST TRIMESTER PREGNANCY: ICD-10-CM

## 2024-09-18 LAB
FLUAV SUBTYP SPEC NAA+PROBE: NOT DETECTED
FLUBV RNA ISLT QL NAA+PROBE: NOT DETECTED
RSV RNA NPH QL NAA+NON-PROBE: NOT DETECTED
S PYO AG THROAT QL: NEGATIVE
SARS-COV-2 RNA RESP QL NAA+PROBE: NOT DETECTED

## 2024-09-18 PROCEDURE — 99283 EMERGENCY DEPT VISIT LOW MDM: CPT

## 2024-09-18 PROCEDURE — 87637 SARSCOV2&INF A&B&RSV AMP PRB: CPT

## 2024-09-18 PROCEDURE — 87081 CULTURE SCREEN ONLY: CPT | Performed by: EMERGENCY MEDICINE

## 2024-09-18 PROCEDURE — 87880 STREP A ASSAY W/OPTIC: CPT

## 2024-09-18 RX ORDER — GUAIFENESIN 600 MG/1
1200 TABLET, EXTENDED RELEASE ORAL 2 TIMES DAILY
Qty: 20 TABLET | Refills: 0 | Status: SHIPPED | OUTPATIENT
Start: 2024-09-18

## 2024-09-20 ENCOUNTER — INITIAL PRENATAL (OUTPATIENT)
Dept: OBSTETRICS AND GYNECOLOGY | Facility: CLINIC | Age: 29
End: 2024-09-20
Payer: COMMERCIAL

## 2024-09-20 ENCOUNTER — PATIENT ROUNDING (BHMG ONLY) (OUTPATIENT)
Dept: OBSTETRICS AND GYNECOLOGY | Facility: CLINIC | Age: 29
End: 2024-09-20
Payer: COMMERCIAL

## 2024-09-20 VITALS
DIASTOLIC BLOOD PRESSURE: 80 MMHG | WEIGHT: 264 LBS | SYSTOLIC BLOOD PRESSURE: 123 MMHG | BODY MASS INDEX: 41.44 KG/M2 | HEIGHT: 67 IN

## 2024-09-20 DIAGNOSIS — O99.210 OBESITY AFFECTING PREGNANCY, ANTEPARTUM, UNSPECIFIED OBESITY TYPE: ICD-10-CM

## 2024-09-20 DIAGNOSIS — Z34.00 SUPERVISION OF NORMAL FIRST PREGNANCY, ANTEPARTUM: Primary | ICD-10-CM

## 2024-09-20 LAB
ABO GROUP BLD: NORMAL
AMPHET+METHAMPHET UR QL: NEGATIVE
B-HCG UR QL: POSITIVE
BACTERIA SPEC AEROBE CULT: NORMAL
BARBITURATES UR QL SCN: NEGATIVE
BASOPHILS # BLD AUTO: 0.03 10*3/MM3 (ref 0–0.2)
BASOPHILS NFR BLD AUTO: 0.3 % (ref 0–1.5)
BENZODIAZ UR QL SCN: NEGATIVE
BLD GP AB SCN SERPL QL: NEGATIVE
CANNABINOIDS SERPL QL: NEGATIVE
COCAINE UR QL: NEGATIVE
DEPRECATED RDW RBC AUTO: 40.4 FL (ref 37–54)
EOSINOPHIL # BLD AUTO: 0.19 10*3/MM3 (ref 0–0.4)
EOSINOPHIL NFR BLD AUTO: 1.9 % (ref 0.3–6.2)
ERYTHROCYTE [DISTWIDTH] IN BLOOD BY AUTOMATED COUNT: 12.3 % (ref 12.3–15.4)
EXPIRATION DATE: ABNORMAL
FENTANYL UR-MCNC: NEGATIVE NG/ML
GLUCOSE UR STRIP-MCNC: NEGATIVE MG/DL
HBA1C MFR BLD: 5 % (ref 4.8–5.6)
HBV SURFACE AG SERPL QL IA: NORMAL
HCT VFR BLD AUTO: 38.6 % (ref 34–46.6)
HCV AB SER QL: NORMAL
HGB BLD-MCNC: 12.7 G/DL (ref 12–15.9)
IMM GRANULOCYTES # BLD AUTO: 0.09 10*3/MM3 (ref 0–0.05)
IMM GRANULOCYTES NFR BLD AUTO: 0.9 % (ref 0–0.5)
INTERNAL NEGATIVE CONTROL: NEGATIVE
INTERNAL POSITIVE CONTROL: ABNORMAL
LYMPHOCYTES # BLD AUTO: 2 10*3/MM3 (ref 0.7–3.1)
LYMPHOCYTES NFR BLD AUTO: 20.4 % (ref 19.6–45.3)
Lab: ABNORMAL
MCH RBC QN AUTO: 29.7 PG (ref 26.6–33)
MCHC RBC AUTO-ENTMCNC: 32.9 G/DL (ref 31.5–35.7)
MCV RBC AUTO: 90.2 FL (ref 79–97)
METHADONE UR QL SCN: NEGATIVE
MONOCYTES # BLD AUTO: 0.49 10*3/MM3 (ref 0.1–0.9)
MONOCYTES NFR BLD AUTO: 5 % (ref 5–12)
NEUTROPHILS NFR BLD AUTO: 7.02 10*3/MM3 (ref 1.7–7)
NEUTROPHILS NFR BLD AUTO: 71.5 % (ref 42.7–76)
NRBC BLD AUTO-RTO: 0 /100 WBC (ref 0–0.2)
OPIATES UR QL: NEGATIVE
OXYCODONE UR QL SCN: NEGATIVE
PLATELET # BLD AUTO: 262 10*3/MM3 (ref 140–450)
PMV BLD AUTO: 8.9 FL (ref 6–12)
PROT UR STRIP-MCNC: NEGATIVE MG/DL
RBC # BLD AUTO: 4.28 10*6/MM3 (ref 3.77–5.28)
RH BLD: POSITIVE
RPR SER QL: NORMAL
WBC NRBC COR # BLD AUTO: 9.82 10*3/MM3 (ref 3.4–10.8)

## 2024-09-20 PROCEDURE — 80307 DRUG TEST PRSMV CHEM ANLYZR: CPT | Performed by: NURSE PRACTITIONER

## 2024-09-20 PROCEDURE — 80081 OBSTETRIC PANEL INC HIV TSTG: CPT | Performed by: NURSE PRACTITIONER

## 2024-09-20 PROCEDURE — 83020 HEMOGLOBIN ELECTROPHORESIS: CPT | Performed by: NURSE PRACTITIONER

## 2024-09-20 PROCEDURE — G0123 SCREEN CERV/VAG THIN LAYER: HCPCS | Performed by: NURSE PRACTITIONER

## 2024-09-20 PROCEDURE — 83036 HEMOGLOBIN GLYCOSYLATED A1C: CPT | Performed by: NURSE PRACTITIONER

## 2024-09-20 PROCEDURE — 86803 HEPATITIS C AB TEST: CPT | Performed by: NURSE PRACTITIONER

## 2024-09-20 PROCEDURE — 87624 HPV HI-RISK TYP POOLED RSLT: CPT | Performed by: NURSE PRACTITIONER

## 2024-09-20 PROCEDURE — 87661 TRICHOMONAS VAGINALIS AMPLIF: CPT | Performed by: NURSE PRACTITIONER

## 2024-09-20 PROCEDURE — 87086 URINE CULTURE/COLONY COUNT: CPT | Performed by: NURSE PRACTITIONER

## 2024-09-20 PROCEDURE — 87591 N.GONORRHOEAE DNA AMP PROB: CPT | Performed by: NURSE PRACTITIONER

## 2024-09-20 PROCEDURE — 87491 CHLMYD TRACH DNA AMP PROBE: CPT | Performed by: NURSE PRACTITIONER

## 2024-09-21 LAB
BACTERIA SPEC AEROBE CULT: NORMAL
HIV 1+2 AB+HIV1 P24 AG SERPL QL IA: NORMAL

## 2024-09-22 LAB — RUBV IGG SERPL IA-ACNC: 1.79 INDEX

## 2024-09-23 ENCOUNTER — REFERRAL TRIAGE (OUTPATIENT)
Dept: LABOR AND DELIVERY | Facility: HOSPITAL | Age: 29
End: 2024-09-23
Payer: COMMERCIAL

## 2024-09-23 DIAGNOSIS — O99.891 ASYMPTOMATIC BACTERIURIA IN PREGNANCY: Primary | ICD-10-CM

## 2024-09-23 DIAGNOSIS — R82.71 ASYMPTOMATIC BACTERIURIA IN PREGNANCY: Primary | ICD-10-CM

## 2024-09-23 LAB
HGB A MFR BLD ELPH: 97.7 % (ref 96.4–98.8)
HGB A2 MFR BLD ELPH: 2.3 % (ref 1.8–3.2)
HGB F MFR BLD ELPH: 0 % (ref 0–2)
HGB FRACT BLD-IMP: NORMAL
HGB S MFR BLD ELPH: 0 %

## 2024-09-23 RX ORDER — NITROFURANTOIN 25; 75 MG/1; MG/1
100 CAPSULE ORAL 2 TIMES DAILY
Qty: 14 CAPSULE | Refills: 0 | Status: SHIPPED | OUTPATIENT
Start: 2024-09-23 | End: 2024-09-30

## 2024-09-25 ENCOUNTER — HOSPITAL ENCOUNTER (EMERGENCY)
Facility: HOSPITAL | Age: 29
Discharge: HOME | End: 2024-09-25
Attending: EMERGENCY MEDICINE | Admitting: EMERGENCY MEDICINE
Payer: COMMERCIAL

## 2024-09-25 VITALS
OXYGEN SATURATION: 98 % | TEMPERATURE: 98.5 F | SYSTOLIC BLOOD PRESSURE: 100 MMHG | HEIGHT: 67 IN | HEART RATE: 107 BPM | BODY MASS INDEX: 41.52 KG/M2 | DIASTOLIC BLOOD PRESSURE: 62 MMHG | WEIGHT: 264.55 LBS | RESPIRATION RATE: 18 BRPM

## 2024-09-25 DIAGNOSIS — J32.9 SINUSITIS, UNSPECIFIED CHRONICITY, UNSPECIFIED LOCATION: Primary | ICD-10-CM

## 2024-09-25 PROCEDURE — 87880 STREP A ASSAY W/OPTIC: CPT | Performed by: EMERGENCY MEDICINE

## 2024-09-25 PROCEDURE — 87637 SARSCOV2&INF A&B&RSV AMP PRB: CPT | Performed by: EMERGENCY MEDICINE

## 2024-09-25 PROCEDURE — 99283 EMERGENCY DEPT VISIT LOW MDM: CPT

## 2024-09-25 PROCEDURE — 87081 CULTURE SCREEN ONLY: CPT | Performed by: EMERGENCY MEDICINE

## 2024-09-25 NOTE — ED TRIAGE NOTES
Pt arrives to ED from home with complaints of left ear pain, coughing, and having yellow drainage from nose.. pt was seen here last week for the same symptoms with symptoms getting worse.. pt is 13 weeks pregnant

## 2024-09-25 NOTE — ED PROVIDER NOTES
Time: 9:34 AM EDT  Date of encounter:  9/25/2024  Independent Historian/Clinical History and Information was obtained by:   Patient and Family    History is limited by: N/A    Chief Complaint: Sinus congestion, drainage      History of Present Illness:  Patient is a 29 y.o. year old female who presents to the emergency department for evaluation of sinus congestion with drainage and left ear discomfort.  Patient reports she has had symptoms for approximately 1 week was seen in our emergency department last week for similar symptoms placed on Mucinex with minimal relief.  Denies fever but is pregnant.      Patient Care Team  Primary Care Provider: Ai Arrington APRN    Past Medical History:     Allergies   Allergen Reactions    Sulfa Antibiotics Hives    Sulfacetamide Sodium Hives    Sulfamethoxazole Hives    Sulfamethoxazole-Trimethoprim Hives    Sulfasalazine Hives    Sulfonylureas Hives     Past Medical History:   Diagnosis Date    Allergies     Carpal tunnel syndrome     LEFT    Hyperlipidemia     Prediabetes      Past Surgical History:   Procedure Laterality Date    ADENOIDECTOMY      CARPAL TUNNEL RELEASE Right 2016    CARPAL TUNNEL RELEASE Left 7/19/2023    Procedure: CARPAL TUNNEL RELEASE;  Surgeon: Bill Jones MD;  Location: [facility];  Service: Orthopedics;  Laterality: Left;    TONSILLECTOMY  2012    TYMPANOSTOMY TUBE PLACEMENT      x2     Family History   Problem Relation Age of Onset    Hypertension Father     Diabetes Father     Hypertension Mother     Diabetes Mother     Hypertension Brother     Diabetes Paternal Grandfather     Diabetes Maternal Grandmother     Deep vein thrombosis Other     Colon cancer Other     Malig Hyperthermia Neg Hx     Miscarriages / Stillbirths Neg Hx     Mental retardation Neg Hx     Mental illness Neg Hx     Learning disabilities Neg Hx     Kidney disease Neg Hx     Hyperlipidemia Neg Hx     Heart disease Neg Hx     Hearing loss Neg Hx     Early death Neg Hx      Drug abuse Neg Hx     Depression Neg Hx     COPD Neg Hx     Cancer Neg Hx     Bleeding Disorder Neg Hx     Birth defects Neg Hx     Asthma Neg Hx     Arthritis Neg Hx     Alcohol abuse Neg Hx     Breast cancer Neg Hx     Ovarian cancer Neg Hx     Uterine cancer Neg Hx     Melanoma Neg Hx     Prostate cancer Neg Hx        Home Medications:  Prior to Admission medications    Medication Sig Start Date End Date Taking? Authorizing Provider   acetaminophen (TYLENOL) 500 MG tablet Take 1 tablet by mouth Every 6 (Six) Hours As Needed for Mild Pain.    Provider, MD Laura   fluticasone (FLONASE) 50 MCG/ACT nasal spray 2 sprays into the nostril(s) as directed by provider Daily for 30 days. 6/21/23 9/11/24  Lesa Britton APRN   guaiFENesin (MUCINEX) 600 MG 12 hr tablet Take 2 tablets by mouth 2 (Two) Times a Day. 9/18/24   Babak Salamanca DO   nitrofurantoin, macrocrystal-monohydrate, (Macrobid) 100 MG capsule Take 1 capsule by mouth 2 (Two) Times a Day for 7 days. 9/23/24 9/30/24  Sergio Bob APRN   prenatal vitamin (prenatal, CLASSIC, vitamin) tablet Take  by mouth Daily.    Provider, MD Laura   sodium chloride 0.65 % nasal spray 1 spray into the nostril(s) as directed by provider As Needed for Congestion. 9/11/24   Ai Arrington APRN        Social History:   Social History     Tobacco Use    Smoking status: Never     Passive exposure: Never    Smokeless tobacco: Never   Vaping Use    Vaping status: Every Day    Substances: Nicotine, Flavoring    Devices: Disposable   Substance Use Topics    Alcohol use: Not Currently    Drug use: Never         Review of Systems:  Review of Systems   Constitutional:  Negative for chills and fever.   HENT:  Positive for ear pain, sinus pressure and sinus pain. Negative for congestion, rhinorrhea and sore throat.    Eyes:  Negative for pain and visual disturbance.   Respiratory:  Negative for apnea, cough, chest tightness and shortness of breath.   ambulatory "  Cardiovascular:  Negative for chest pain and palpitations.   Gastrointestinal:  Negative for abdominal pain, diarrhea, nausea and vomiting.   Genitourinary:  Negative for difficulty urinating and dysuria.   Musculoskeletal:  Negative for joint swelling and myalgias.   Skin:  Negative for color change.   Neurological:  Negative for seizures and headaches.   Psychiatric/Behavioral: Negative.     All other systems reviewed and are negative.       Physical Exam:  /62   Pulse 107   Temp 98.5 °F (36.9 °C) (Oral)   Resp 18   Ht 170.2 cm (67\")   Wt 120 kg (264 lb 8.8 oz)   LMP 06/26/2024   SpO2 98%   BMI 41.43 kg/m²     Physical Exam  Vitals and nursing note reviewed.   Constitutional:       General: She is not in acute distress.     Appearance: Normal appearance. She is not toxic-appearing.   HENT:      Head: Normocephalic and atraumatic.      Jaw: There is normal jaw occlusion.      Left Ear: Tympanic membrane, ear canal and external ear normal.      Nose: Congestion present.      Mouth/Throat:      Mouth: Mucous membranes are moist.   Eyes:      General: Lids are normal.      Extraocular Movements: Extraocular movements intact.      Conjunctiva/sclera: Conjunctivae normal.      Pupils: Pupils are equal, round, and reactive to light.   Cardiovascular:      Rate and Rhythm: Normal rate and regular rhythm.      Pulses: Normal pulses.      Heart sounds: Normal heart sounds.   Pulmonary:      Effort: Pulmonary effort is normal. No respiratory distress.      Breath sounds: Normal breath sounds. No wheezing or rhonchi.   Abdominal:      General: Abdomen is flat.      Palpations: Abdomen is soft.      Tenderness: There is no abdominal tenderness. There is no guarding or rebound.   Musculoskeletal:         General: Normal range of motion.      Cervical back: Normal range of motion and neck supple.      Right lower leg: No edema.      Left lower leg: No edema.   Skin:     General: Skin is warm and dry. "   Neurological:      Mental Status: She is alert and oriented to person, place, and time. Mental status is at baseline.   Psychiatric:         Mood and Affect: Mood normal.                  Procedures:  Procedures      Medical Decision Making:      Comorbidities that affect care:    Pregnancy    External Notes reviewed:    None      The following orders were placed and all results were independently analyzed by me:  Orders Placed This Encounter   Procedures    COVID-19, FLU A/B, RSV PCR 1 HR TAT - Swab, Nasopharynx    Rapid Strep A Screen - Swab, Throat    Beta Strep Culture, Throat - Swab, Throat       Medications Given in the Emergency Department:  Medications - No data to display     ED Course:         Labs:    Lab Results (last 24 hours)       Procedure Component Value Units Date/Time    COVID-19, FLU A/B, RSV PCR 1 HR TAT - Swab, Nasopharynx [[id number]]  (Normal) Collected: 09/25/24 0924    Specimen: Swab from Nasopharynx Updated: 09/25/24 1008     COVID19 Not Detected     Influenza A PCR Not Detected     Influenza B PCR Not Detected     RSV, PCR Not Detected    Narrative:      Fact sheet for providers: https://www.fda.gov/media/394420/download    Fact sheet for patients: https://www.fda.gov/media/240803/download    Test performed by PCR.    Rapid Strep A Screen - Swab, Throat [[id number]]  (Normal) Collected: 09/25/24 0924    Specimen: Swab from Throat Updated: 09/25/24 0944     Strep A Ag Negative    Beta Strep Culture, Throat - Swab, Throat [[id number]] Collected: 09/25/24 0924    Specimen: Swab from Throat Updated: 09/25/24 0944             Imaging:    No Radiology Exams Resulted Within Past 24 Hours      Differential Diagnosis and Discussion:    Viral syndrome: Differential diagnosis includes but is not limited to influenza, common cold, COVID-19, RSV, adenovirus, enteroviruses, herpes virus, hepatitis virus, measles, mumps, rubella, dengue fever, and possible bacterial infection.    All labs were reviewed  and interpreted by me.    MDM  Number of Diagnoses or Management Options  Sinusitis, unspecified chronicity, unspecified location  Diagnosis management comments: In summary this is a 29-year-old female patient, pregnant, who presents to the emergency department for evaluation of sinus pressure, discharge, congestion and ear pain.  Strep test independent reviewed interpreted by me is unremarkable and negative.  COVID-19, influenza, RSV testing all independently reviewed and interpreted by me are negative.  Patient will be discharged home with prescription for Augmentin.  She is otherwise well-appearing in no acute distress.  Very strict return to ER and follow-up instructions have been provided to the patient.                         Patient Care Considerations:    CHEST X-RAY: I considered ordering a chest x-ray however no respiratory distress      Consultants/Shared Management Plan:    None    Social Determinants of Health:    Patient is independent, reliable, and has access to care.       Disposition and Care Coordination:    Discharged: The patient is suitable and stable for discharge with no need for consideration of admission.    I have explained the patient´s condition, diagnoses and treatment plan based on the information available to me at this time. I have answered questions and addressed any concerns. The patient has a good  understanding of the patient´s diagnosis, condition, and treatment plan as can be expected at this point. The vital signs have been stable. The patient´s condition is stable and appropriate for discharge from the emergency department.      The patient will pursue further outpatient evaluation with the primary care physician or other designated or consulting physician as outlined in the discharge instructions. They are agreeable to this plan of care and follow-up instructions have been explained in detail. The patient has received these instructions in written format and has expressed  an understanding of the discharge instructions. The patient is aware that any significant change in condition or worsening of symptoms should prompt an immediate return to this or the closest emergency department or call to 911.  I have explained discharge medications and the need for follow up with the patient/caretakers. This was also printed in the discharge instructions. Patient was discharged with the following medications and follow up:      Medication List        New Prescriptions      amoxicillin-clavulanate 875-125 MG per tablet  Commonly known as: AUGMENTIN  Take 1 tablet by mouth 2 (Two) Times a Day.               Where to Get Your Medications        These medications were sent to [facility]/pharmacy #[id number] - [place], [place] - [address] - [phone]  - [phone]   [address] [place] [address]      Hours: 24-hours Phone: [phone]   amoxicillin-clavulanate 875-125 MG per tablet      Ai Arrington APRN  [address] [facility]  [address] [place] [id number] [phone]    In 1 week         Final diagnoses:   Sinusitis, unspecified chronicity, unspecified location        ED Disposition       ED Disposition   Discharge    Condition   Stable    Comment   --               This medical record created using voice recognition software.             Charles Calderon MD  09/25/24 3003

## 2024-09-27 LAB
BACTERIA SPEC AEROBE CULT: NORMAL
C TRACH RRNA CVX QL NAA+PROBE: NEGATIVE
CYTOLOGIST CVX/VAG CYTO: NORMAL
CYTOLOGY CVX/VAG DOC CYTO: NORMAL
CYTOLOGY CVX/VAG DOC THIN PREP: NORMAL
DX ICD CODE: NORMAL
HPV GENOTYPE REFLEX: NORMAL
HPV I/H RISK 4 DNA CVX QL PROBE+SIG AMP: NEGATIVE
Lab: NORMAL
N GONORRHOEA RRNA CVX QL NAA+PROBE: NEGATIVE
OTHER STN SPEC: NORMAL
STAT OF ADQ CVX/VAG CYTO-IMP: NORMAL
T VAGINALIS RRNA SPEC QL NAA+PROBE: NEGATIVE

## 2024-10-04 ENCOUNTER — OFFICE VISIT (OUTPATIENT)
Dept: FAMILY MEDICINE CLINIC | Facility: CLINIC | Age: 29
End: 2024-10-04
Payer: COMMERCIAL

## 2024-10-04 VITALS
SYSTOLIC BLOOD PRESSURE: 122 MMHG | OXYGEN SATURATION: 100 % | WEIGHT: 268.6 LBS | DIASTOLIC BLOOD PRESSURE: 71 MMHG | BODY MASS INDEX: 42.16 KG/M2 | HEIGHT: 67 IN | HEART RATE: 96 BPM | TEMPERATURE: 98.4 F

## 2024-10-04 DIAGNOSIS — N39.0 FREQUENT UTI: Primary | ICD-10-CM

## 2024-10-04 DIAGNOSIS — Z53.21 PATIENT LEFT WITHOUT BEING SEEN: ICD-10-CM

## 2024-10-04 LAB
BILIRUB BLD-MCNC: NEGATIVE MG/DL
CLARITY, POC: CLEAR
COLOR UR: YELLOW
EXPIRATION DATE: NORMAL
GLUCOSE UR STRIP-MCNC: NEGATIVE MG/DL
KETONES UR QL: NEGATIVE
LEUKOCYTE EST, POC: NEGATIVE
Lab: NORMAL
NITRITE UR-MCNC: NEGATIVE MG/ML
PH UR: 7 [PH] (ref 5–8)
PROT UR STRIP-MCNC: NEGATIVE MG/DL
RBC # UR STRIP: NEGATIVE /UL
SP GR UR: 1.02 (ref 1–1.03)
UROBILINOGEN UR QL: NORMAL

## 2024-10-04 NOTE — PROGRESS NOTES
The patient left the office before care was provided and did not complete the visit  because she needed to leave by 1030 .

## 2024-10-04 NOTE — PROGRESS NOTES
Chief Complaint     Nasal Congestion (X3wks ), Cough, Ear Fullness, and Difficulty Urinating (Itching and burning )    History of Present Illness     Jessie Schultz is a 29 y.o. female who presents to [facility] for evaluation of ***.        ***     History      Past Medical History:   Diagnosis Date    Allergies     Carpal tunnel syndrome     LEFT    Hyperlipidemia     Prediabetes        Past Surgical History:   Procedure Laterality Date    ADENOIDECTOMY      CARPAL TUNNEL RELEASE Right 2016    CARPAL TUNNEL RELEASE Left 7/19/2023    Procedure: CARPAL TUNNEL RELEASE;  Surgeon: Bill Jones MD;  Location: [facility] OR [facility] [place];  Service: Orthopedics;  Laterality: Left;    TONSILLECTOMY  2012    TYMPANOSTOMY TUBE PLACEMENT      x2       Family History   Problem Relation Age of Onset    Hypertension Father     Diabetes Father     Hypertension Mother     Diabetes Mother     Hypertension Brother     Diabetes Paternal Grandfather     Diabetes Maternal Grandmother     Deep vein thrombosis Other     Colon cancer Other     Malig Hyperthermia Neg Hx     Miscarriages / Stillbirths Neg Hx     Mental retardation Neg Hx     Mental illness Neg Hx     Learning disabilities Neg Hx     Kidney disease Neg Hx     Hyperlipidemia Neg Hx     Heart disease Neg Hx     Hearing loss Neg Hx     Early death Neg Hx     Drug abuse Neg Hx     Depression Neg Hx     COPD Neg Hx     Cancer Neg Hx     Bleeding Disorder Neg Hx     Birth defects Neg Hx     Asthma Neg Hx     Arthritis Neg Hx     Alcohol abuse Neg Hx     Breast cancer Neg Hx     Ovarian cancer Neg Hx     Uterine cancer Neg Hx     Melanoma Neg Hx     Prostate cancer Neg Hx         Current Medications        Current Outpatient Medications:     acetaminophen (TYLENOL) 500 MG tablet, Take 1 tablet by mouth Every 6 (Six) Hours As Needed for Mild Pain., Disp: , Rfl:     prenatal vitamin (prenatal, CLASSIC, vitamin) tablet, Take  by mouth Daily., Disp: , Rfl:      "amoxicillin-clavulanate (AUGMENTIN) 875-125 MG per tablet, Take 1 tablet by mouth 2 (Two) Times a Day., Disp: 14 tablet, Rfl: 0    fluticasone (FLONASE) 50 MCG/ACT nasal spray, 2 sprays into the nostril(s) as directed by provider Daily for 30 days., Disp: 16 g, Rfl: 6    guaiFENesin (MUCINEX) 600 MG 12 hr tablet, Take 2 tablets by mouth 2 (Two) Times a Day. (Patient not taking: Reported on 10/4/2024), Disp: 20 tablet, Rfl: 0    sodium chloride 0.65 % nasal spray, 1 spray into the nostril(s) as directed by provider As Needed for Congestion. (Patient not taking: Reported on 10/4/2024), Disp: 44 mL, Rfl: 12     Allergies     Allergies   Allergen Reactions    Sulfa Antibiotics Hives    Sulfacetamide Sodium Hives    Sulfamethoxazole Hives    Sulfamethoxazole-Trimethoprim Hives    Sulfasalazine Hives    Sulfonylureas Hives       Social History       Social History     Social History Narrative    Not on file       Immunizations     Immunization:  Immunization History   Administered Date(s) Administered    Covid-19 (Pfizer) Gray Cap Monovalent 02/12/2022    Flu Vaccine Intradermal Quad 18-64YR 02/12/2022    HPV Quadrivalent 08/17/2007    Influenza Seasonal Injectable 02/12/2022    Tdap 08/17/2007, 11/29/2014          Objective     Objective     Vital Signs:   /71 (BP Location: Left arm, Patient Position: Sitting, Cuff Size: Adult)   Pulse 96   Temp 98.4 °F (36.9 °C)   Ht 170.2 cm (67\")   Wt 122 kg (268 lb 9.6 oz)   SpO2 100%   BMI 42.07 kg/m²       Physical Exam    Results    The following data was reviewed by: Halle Blackwell MA on 10/04/2024:    {Ambulatory Labs:34550}  {Data Reviewed:69368}       Assessment and Plan        Assessment and Plan {CC Problem List  Visit Diagnosis  ROS  Review (Popup)  Health Maintenance  Quality  BestPractice  Medications  SmartSets  SnapShot Encounters  Media :23}   Diagnoses and all orders for this visit:    1. Frequent UTI (Primary)  -     POCT urinalysis " dipstick, automated            {Time Spent (Optional):80277}  Follow Up        Follow Up {Instructions Charge Capture  Follow-up Communications :23}  No follow-ups on file.  Patient was given instructions and counseling regarding her condition or for health maintenance advice. Please see specific information pulled into the AVS if appropriate.

## 2024-10-13 ENCOUNTER — HOSPITAL ENCOUNTER (EMERGENCY)
Facility: HOSPITAL | Age: 29
Discharge: HOME | End: 2024-10-13
Attending: EMERGENCY MEDICINE | Admitting: EMERGENCY MEDICINE
Payer: OTHER MISCELLANEOUS

## 2024-10-13 VITALS
TEMPERATURE: 98.4 F | RESPIRATION RATE: 20 BRPM | DIASTOLIC BLOOD PRESSURE: 74 MMHG | SYSTOLIC BLOOD PRESSURE: 119 MMHG | OXYGEN SATURATION: 100 % | HEART RATE: 78 BPM

## 2024-10-13 DIAGNOSIS — M54.6 ACUTE LEFT-SIDED THORACIC BACK PAIN: Primary | ICD-10-CM

## 2024-10-13 PROCEDURE — 99283 EMERGENCY DEPT VISIT LOW MDM: CPT

## 2024-10-13 RX ORDER — ACETAMINOPHEN 500 MG
1000 TABLET ORAL ONCE
Status: COMPLETED | OUTPATIENT
Start: 2024-10-13 | End: 2024-10-13

## 2024-10-13 RX ADMIN — ACETAMINOPHEN 1000 MG: 500 TABLET ORAL at 13:30

## 2024-10-13 NOTE — ED PROVIDER NOTES
"Time: 1:23 PM EDT  Date of encounter:  10/13/2024  Independent Historian/Clinical History and Information was obtained by:   Patient    History is limited by: N/A    Chief Complaint: Back pain      History of Present Illness:  Patient is a 29 y.o. year old female who presents to the emergency department for evaluation of sudden onset left-sided back pain that occurred while she was at work today.  Patient reports she was reaching into a bin with her left arm outstretched when she felt a sudden \"pop\" and pain that radiates from left side of her neck down to her left lower back.  Patient reports she is able to ambulate but has pain with walking for an extended amount of time.  Patient also reports mild and intermittent numbness and tingling in the left hand.  Patient has full range of motion of the left upper extremity and neck.  No saddle anesthesia or numbness and tingling in the lower extremities.  Patient denies previous injury of the back.  Patient also reports she is approximately 14-15 weeks pregnant.      Patient Care Team  Primary Care Provider: Ai Arrington APRN    Past Medical History:     Allergies   Allergen Reactions    Sulfa Antibiotics Hives    Sulfacetamide Sodium Hives    Sulfamethoxazole Hives    Sulfamethoxazole-Trimethoprim Hives    Sulfasalazine Hives    Sulfonylureas Hives     Past Medical History:   Diagnosis Date    Allergies     Carpal tunnel syndrome     LEFT    Hyperlipidemia     Prediabetes      Past Surgical History:   Procedure Laterality Date    ADENOIDECTOMY      CARPAL TUNNEL RELEASE Right 2016    CARPAL TUNNEL RELEASE Left 7/19/2023    Procedure: CARPAL TUNNEL RELEASE;  Surgeon: Bill Jones MD;  [place] [facility] [place] [facility] [place]  Service: [place]opedics;  Laterality: Left;    TONSILLECTOMY  2012    TYMPANOSTOMY TUBE PLACEMENT      x2     Family History   Problem Relation Age of Onset    Hypertension Father     Diabetes Father     Hypertension Mother     Diabetes Mother     " Hypertension Brother     Diabetes Paternal Grandfather     Diabetes Maternal Grandmother     Deep vein thrombosis Other     Colon cancer Other     Malig Hyperthermia Neg Hx     Miscarriages / Stillbirths Neg Hx     Mental retardation Neg Hx     Mental illness Neg Hx     Learning disabilities Neg Hx     Kidney disease Neg Hx     Hyperlipidemia Neg Hx     Heart disease Neg Hx     Hearing loss Neg Hx     Early death Neg Hx     Drug abuse Neg Hx     Depression Neg Hx     COPD Neg Hx     Cancer Neg Hx     Bleeding Disorder Neg Hx     Birth defects Neg Hx     Asthma Neg Hx     Arthritis Neg Hx     Alcohol abuse Neg Hx     Breast cancer Neg Hx     Ovarian cancer Neg Hx     Uterine cancer Neg Hx     Melanoma Neg Hx     Prostate cancer Neg Hx        Home Medications:  Prior to Admission medications    Medication Sig Start Date End Date Taking? Authorizing Provider   acetaminophen (TYLENOL) 500 MG tablet Take 1 tablet by mouth Every 6 (Six) Hours As Needed for Mild Pain.    Provider, MD Laura   fluticasone (FLONASE) 50 MCG/ACT nasal spray 2 sprays into the nostril(s) as directed by provider Daily for 30 days. 6/21/23 9/11/24  Lesa Britton APRN   guaiFENesin (MUCINEX) 600 MG 12 hr tablet Take 2 tablets by mouth 2 (Two) Times a Day.  Patient not taking: Reported on 10/4/2024 9/18/24   Babak Salamanca,    prenatal vitamin (prenatal, CLASSIC, vitamin) tablet Take  by mouth Daily.    Provider, MD Laura   sodium chloride 0.65 % nasal spray 1 spray into the nostril(s) as directed by provider As Needed for Congestion.  Patient not taking: Reported on 10/4/2024 9/11/24   Ai Arrington APRN   amoxicillin-clavulanate (AUGMENTIN) 875-125 MG per tablet Take 1 tablet by mouth 2 (Two) Times a Day. 9/25/24 10/13/24  Charles Calderon MD        Social History:   Social History     Tobacco Use    Smoking status: Never     Passive exposure: Never    Smokeless tobacco: Never   Vaping Use    Vaping status: Former     Substances: Nicotine, Flavoring    Devices: Disposable   Substance Use Topics    Alcohol use: Not Currently    Drug use: Never         Review of Systems:  Review of Systems   Constitutional:  Negative for chills and fever.   Gastrointestinal:  Negative for abdominal pain, nausea and vomiting.        Negative for bowel incontinence   Genitourinary:  Negative for dysuria, flank pain and hematuria.        Negative for bladder incontinence   Musculoskeletal:  Positive for back pain.   Neurological:  Negative for weakness and numbness.        Negative for saddle anesthesia   All other systems reviewed and are negative.       Physical Exam:  /74 (BP Location: Left arm, Patient Position: Sitting)   Pulse 78   Temp 98.4 °F (36.9 °C)   Resp 20   LMP 06/26/2024   SpO2 100%     Physical Exam  Vitals and nursing note reviewed.   Constitutional:       Appearance: Normal appearance. She is not ill-appearing or toxic-appearing.   HENT:      Head: Normocephalic.      Nose: Nose normal.   Eyes:      Extraocular Movements: Extraocular movements intact.      Conjunctiva/sclera: Conjunctivae normal.      Pupils: Pupils are equal, round, and reactive to light.   Cardiovascular:      Rate and Rhythm: Normal rate.      Pulses: Normal pulses.   Pulmonary:      Effort: Pulmonary effort is normal.   Abdominal:      General: Abdomen is flat. There is no distension.      Palpations: Abdomen is soft.   Musculoskeletal:      Cervical back: Normal range of motion and neck supple. Tenderness present. No swelling, deformity or crepitus. Normal range of motion.      Thoracic back: Tenderness present. No swelling, deformity or bony tenderness. Normal range of motion.      Lumbar back: Tenderness present. No swelling, deformity or bony tenderness. Normal range of motion. Positive left straight leg raise test.   Skin:     General: Skin is warm and dry.      Capillary Refill: Capillary refill takes less than 2 seconds.   Neurological:       General: No focal deficit present.      Mental Status: She is alert and oriented to person, place, and time.   Psychiatric:         Mood and Affect: Mood normal.            Procedures:  Procedures      Medical Decision Making:      Comorbidities that affect care:    Obesity, Pregnancy    External Notes reviewed:    Previous Clinic Note: Family medicine office visit from 10/4/2024      The following orders were placed and all results were independently analyzed by me:  No orders of the defined types were placed in this encounter.      Medications Given in the Emergency Department:  Medications   acetaminophen (TYLENOL) tablet 1,000 mg (1,000 mg Oral Given 10/13/24 1330)        ED Course:         Labs:    Lab Results (last 24 hours)       ** No results found for the last 24 hours. **             Imaging:    No Radiology Exams Resulted Within Past 24 Hours      Differential Diagnosis and Discussion:    Back Pain: The patient presents with back pain. My differential diagnosis includes but is not limited to acute spinal epidural abscess, acute spinal epidural bleed, cauda equina syndrome, abdominal aortic aneurysm, aortic dissection, kidney stone, pyelonephritis, musculoskeletal back pain, spinal fracture, and osteoarthritis.         MDM     The patient's symptoms are consistent with musculoskeletal back pain. The patient is now resting comfortably, feels better, is alert, talkative, interactive and in no distress. The repeat examination is unremarkable and benign. The patient is neurologically intact and is ambulatory in the ED. The patient has no fever, no bowel or bladder incontinence, no saddle anesthesia, and is otherwise alert and well appearing. The history, physical exam, and diagnostics (if any) do not suggest the presence of acute spinal epidural abscess, acute spinal epidural bleed, cauda equina syndrome, abdominal aortic aneurysm, aortic dissection or other process requiring further testing, treatment or  consultation in the emergency department. The vital signs have been stable. The patient's condition is stable and appropriate for discharge. The patient will pursue further outpatient evaluation with the primary care physician or other designated for consulting position as indicated in the discharge instructions.                Patient Care Considerations:    Considered ordering x-rays of the lumbar, thoracic, cervical spine, however patient has full range of motion of her extremities, cervical spine and lumbar spine.  Patient is also approximately 15 weeks pregnant.  Discussed risks and benefits of imaging and patient agrees to defer imaging at this time.      Consultants/Shared Management Plan:    SHARED VISIT: I have discussed the case with my supervising physician, Dr. Slaughter who states he does not recommend imaging as patient has full range of motion and she is currently pregnant.  Also recommends Tylenol for pain and follow-up with PCP. The substantive portion of the medical decision was made by the attesting physician who made or approve the management plan and will take responsibility for the patient.  Clinical findings were discussed and ultimate disposition was made in consult with supervising physician.    Social Determinants of Health:    Patient is independent, reliable, and has access to care.       Disposition and Care Coordination:    Discharged: The patient is suitable and stable for discharge with no need for consideration of admission.    I have explained the patient´s condition, diagnoses and treatment plan based on the information available to me at this time. I have answered questions and addressed any concerns. The patient has a good  understanding of the patient´s diagnosis, condition, and treatment plan as can be expected at this point. The vital signs have been stable. The patient´s condition is stable and appropriate for discharge from the emergency department.      The patient will  pursue further outpatient evaluation with the primary care physician or other designated or consulting physician as outlined in the discharge instructions. They are agreeable to this plan of care and follow-up instructions have been explained in detail. The patient has received these instructions in written format and has expressed an understanding of the discharge instructions. The patient is aware that any significant change in condition or worsening of symptoms should prompt an immediate return to this or the closest emergency department or call to 911.  I have explained discharge medications and the need for follow up with the patient/caretakers. This was also printed in the discharge instructions. Patient was discharged with the following medications and follow up:      Medication List      No changes were made to your prescriptions during this visit.      Ai Arrington, MARIA E  [address] [address]  [phone]    Call in 1 day         Final diagnoses:   Acute left-sided thoracic back pain        ED Disposition       ED Disposition   Discharge    Condition   Stable    Comment   --               This medical record created using voice recognition software.             Kylah Huang APRN  10/13/24 [id number]

## 2024-10-13 NOTE — DISCHARGE INSTRUCTIONS
Avoid strenuous activity or heavy lifting, pushing, or pulling for the next several days.  Make sure you contact your PCP in the morning and request referral for physical therapy.  You may continue to take Tylenol for your pain.  Due to your current pregnancy and it is not advised to take anti-inflammatories such as ibuprofen.  You may try alternating ice and heat to your sore areas to see what helps relieve your pain.  You may also try using a topical ointment such as Biofreeze or IcyHot.  Follow-up with Mauro in the morning.

## 2024-10-13 NOTE — ED PROVIDER NOTES
SHARED VISIT NOTE:    Patient is 29 y.o. year old female that presents to the ED for evaluation of back pain.     Physical Exam    ED Course:    /74 (BP Location: Left arm, Patient Position: Sitting)   Pulse 78   Temp 98.4 °F (36.9 °C)   Resp 20   LMP 06/26/2024   SpO2 100%   Results for orders placed or performed in visit on 10/04/24   POCT urinalysis dipstick, automated    Collection Time: 10/04/24 10:13 AM    Specimen: Urine   Result Value Ref Range    Color Yellow Yellow, Straw, Dark Yellow, Sheila    Clarity, UA Clear Clear    Specific Gravity  1.020 1.005 - 1.030    pH, Urine 7.0 5.0 - 8.0    Leukocytes Negative Negative    Nitrite, UA Negative Negative    Protein, POC Negative Negative mg/dL    Glucose, UA Negative Negative mg/dL    Ketones, UA Negative Negative    Urobilinogen, UA Normal Normal, 0.2 E.U./dL    Bilirubin Negative Negative    Blood, UA Negative Negative    Lot Number [id number]     Expiration Date 6/30/25      Medications - No data to display  No results found.    MDM:    Procedures              SHARED VISIT ATTESTATION:    This visit was performed by both myself and an APC.  I performed the substantive portion of the medical decision making. The management plan was made or approved by me, and I take responsibility for patient management.           Earline Slaughter MD  13:16 EDT  10/13/24         Earline Slaughter MD  10/13/24 [id number]

## 2024-10-13 NOTE — ED NOTES
OCCUPATIONAL MED CLOSED TODAY. EMPLOYER NOT UNDER CONTRACT WITH [facility] FOR DRUG SCREENS. ASK PT TO CALL COMPANY EMPLOYED BY, PT DECLINED, OFFERED DRUG SCREEN UNSUPERVISED AND WITHOUT CHAIN OF CUSTODY, PT DECLINED. GIVE PT ADDRESS AND PHONE #OF OCCUPATIONAL HEALTH IN [place] THAT WAS OPENED, PT DECLINED.  sTATES WILL FOLLOW UP IN AM HERE LOCALLY.

## 2024-10-19 ENCOUNTER — HOSPITAL ENCOUNTER (EMERGENCY)
Facility: HOSPITAL | Age: 29
Discharge: HOME | End: 2024-10-19
Attending: EMERGENCY MEDICINE
Payer: COMMERCIAL

## 2024-10-19 VITALS
WEIGHT: 270.06 LBS | SYSTOLIC BLOOD PRESSURE: 128 MMHG | HEIGHT: 68 IN | RESPIRATION RATE: 20 BRPM | DIASTOLIC BLOOD PRESSURE: 70 MMHG | TEMPERATURE: 98 F | HEART RATE: 80 BPM | OXYGEN SATURATION: 99 % | BODY MASS INDEX: 40.93 KG/M2

## 2024-10-19 DIAGNOSIS — K59.00 CONSTIPATION DURING PREGNANCY IN SECOND TRIMESTER: Primary | ICD-10-CM

## 2024-10-19 DIAGNOSIS — O99.612 CONSTIPATION DURING PREGNANCY IN SECOND TRIMESTER: Primary | ICD-10-CM

## 2024-10-19 LAB
ALBUMIN SERPL-MCNC: 3.7 G/DL (ref 3.5–5.2)
ALBUMIN/GLOB SERPL: 1.1 G/DL
ALP SERPL-CCNC: 44 U/L (ref 39–117)
ALT SERPL W P-5'-P-CCNC: 11 U/L (ref 1–33)
ANION GAP SERPL CALCULATED.3IONS-SCNC: 12 MMOL/L (ref 5–15)
AST SERPL-CCNC: 11 U/L (ref 1–32)
BASOPHILS # BLD AUTO: 0.03 10*3/MM3 (ref 0–0.2)
BASOPHILS NFR BLD AUTO: 0.3 % (ref 0–1.5)
BILIRUB SERPL-MCNC: <0.2 MG/DL (ref 0–1.2)
BUN SERPL-MCNC: 8 MG/DL (ref 6–20)
BUN/CREAT SERPL: 18.6 (ref 7–25)
CALCIUM SPEC-SCNC: 9.1 MG/DL (ref 8.6–10.5)
CHLORIDE SERPL-SCNC: 103 MMOL/L (ref 98–107)
CO2 SERPL-SCNC: 22 MMOL/L (ref 22–29)
CREAT SERPL-MCNC: 0.43 MG/DL (ref 0.57–1)
DEPRECATED RDW RBC AUTO: 41.9 FL (ref 37–54)
EGFRCR SERPLBLD CKD-EPI 2021: 135.2 ML/MIN/1.73
EOSINOPHIL # BLD AUTO: 0.14 10*3/MM3 (ref 0–0.4)
EOSINOPHIL NFR BLD AUTO: 1.3 % (ref 0.3–6.2)
ERYTHROCYTE [DISTWIDTH] IN BLOOD BY AUTOMATED COUNT: 13.2 % (ref 12.3–15.4)
GLOBULIN UR ELPH-MCNC: 3.3 GM/DL
GLUCOSE SERPL-MCNC: 91 MG/DL (ref 65–99)
HCG INTACT+B SERPL-ACNC: NORMAL MIU/ML
HCT VFR BLD AUTO: 34.7 % (ref 34–46.6)
HGB BLD-MCNC: 11.8 G/DL (ref 12–15.9)
HOLD SPECIMEN: NORMAL
HOLD SPECIMEN: NORMAL
IMM GRANULOCYTES # BLD AUTO: 0.14 10*3/MM3 (ref 0–0.05)
IMM GRANULOCYTES NFR BLD AUTO: 1.3 % (ref 0–0.5)
LIPASE SERPL-CCNC: 27 U/L (ref 13–60)
LYMPHOCYTES # BLD AUTO: 2.07 10*3/MM3 (ref 0.7–3.1)
LYMPHOCYTES NFR BLD AUTO: 18.7 % (ref 19.6–45.3)
MCH RBC QN AUTO: 29.9 PG (ref 26.6–33)
MCHC RBC AUTO-ENTMCNC: 34 G/DL (ref 31.5–35.7)
MCV RBC AUTO: 87.8 FL (ref 79–97)
MONOCYTES # BLD AUTO: 0.5 10*3/MM3 (ref 0.1–0.9)
MONOCYTES NFR BLD AUTO: 4.5 % (ref 5–12)
NEUTROPHILS NFR BLD AUTO: 73.9 % (ref 42.7–76)
NEUTROPHILS NFR BLD AUTO: 8.2 10*3/MM3 (ref 1.7–7)
NRBC BLD AUTO-RTO: 0 /100 WBC (ref 0–0.2)
PLATELET # BLD AUTO: 241 10*3/MM3 (ref 140–450)
PMV BLD AUTO: 9.1 FL (ref 6–12)
POTASSIUM SERPL-SCNC: 3.8 MMOL/L (ref 3.5–5.2)
PROT SERPL-MCNC: 7 G/DL (ref 6–8.5)
RBC # BLD AUTO: 3.95 10*6/MM3 (ref 3.77–5.28)
SODIUM SERPL-SCNC: 137 MMOL/L (ref 136–145)
WBC NRBC COR # BLD AUTO: 11.08 10*3/MM3 (ref 3.4–10.8)
WHOLE BLOOD HOLD COAG: NORMAL
WHOLE BLOOD HOLD SPECIMEN: NORMAL

## 2024-10-19 PROCEDURE — 99284 EMERGENCY DEPT VISIT MOD MDM: CPT

## 2024-10-19 PROCEDURE — 84702 CHORIONIC GONADOTROPIN TEST: CPT | Performed by: EMERGENCY MEDICINE

## 2024-10-19 PROCEDURE — 83690 ASSAY OF LIPASE: CPT | Performed by: EMERGENCY MEDICINE

## 2024-10-19 PROCEDURE — 80053 COMPREHEN METABOLIC PANEL: CPT | Performed by: EMERGENCY MEDICINE

## 2024-10-19 PROCEDURE — 85025 COMPLETE CBC W/AUTO DIFF WBC: CPT | Performed by: EMERGENCY MEDICINE

## 2024-10-19 RX ORDER — LACTULOSE 10 G/15ML
20 SOLUTION ORAL DAILY PRN
Qty: 150 ML | Refills: 0 | Status: SHIPPED | OUTPATIENT
Start: 2024-10-19

## 2024-10-19 RX ORDER — SODIUM CHLORIDE 0.9 % (FLUSH) 0.9 %
10 SYRINGE (ML) INJECTION AS NEEDED
Status: DISCONTINUED | OUTPATIENT
Start: 2024-10-19 | End: 2024-10-19 | Stop reason: HOSPADM

## 2024-10-19 RX ORDER — LACTULOSE 10 G/15ML
20 SOLUTION ORAL ONCE
Status: DISCONTINUED | OUTPATIENT
Start: 2024-10-19 | End: 2024-10-19

## 2024-10-19 RX ORDER — BISACODYL 10 MG
10 SUPPOSITORY, RECTAL RECTAL DAILY
Qty: 5 SUPPOSITORY | Refills: 0 | Status: SHIPPED | OUTPATIENT
Start: 2024-10-19 | End: 2024-10-24

## 2024-10-19 RX ADMIN — DOCUSATE SODIUM 1 ENEMA: 283 LIQUID RECTAL at 17:07

## 2024-10-19 NOTE — ED PROVIDER NOTES
Time: 4:27 PM EDT  Date of encounter:  10/19/2024  Independent Historian/Clinical History and Information was obtained by:   Patient    History is limited by: N/A    Chief Complaint: Constipation      History of Present Illness:  Patient is a 29 y.o. year old female who presents to the emergency department for evaluation of constipation, abdominal discomfort.  Patient reports she has not had a bowel movement since yesterday.  She reports she has taken 3 doses of a stool softener.  Patient reports normally she has bowel movements twice a day.  Patient also reports she is approximately 14 weeks pregnant.  Patient denies nausea or vomiting, denies urinary symptoms, denies vaginal bleeding or pelvic pain.  Patient has no abdominal tenderness, reports feeling of fullness and decreased appetite.      Patient Care Team  Primary Care Provider: Ai Arrington APRN    Past Medical History:     Allergies   Allergen Reactions    Sulfa Antibiotics Hives    Sulfacetamide Sodium Hives    Sulfamethoxazole Hives    Sulfamethoxazole-Trimethoprim Hives    Sulfasalazine Hives    Sulfonylureas Hives     Past Medical History:   Diagnosis Date    Allergies     Carpal tunnel syndrome     LEFT    Hyperlipidemia     Prediabetes      Past Surgical History:   Procedure Laterality Date    ADENOIDECTOMY      CARPAL TUNNEL RELEASE Right 2016    CARPAL TUNNEL RELEASE Left 7/19/2023    Procedure: CARPAL TUNNEL RELEASE;  Surgeon: Bill Jones MD;  [place] [facility] [place] [facility] [place]  Service: [place]opedics;  Laterality: Left;    TONSILLECTOMY  2012    TYMPANOSTOMY TUBE PLACEMENT      x2     Family History   Problem Relation Age of Onset    Hypertension Father     Diabetes Father     Hypertension Mother     Diabetes Mother     Hypertension Brother     Diabetes Paternal Grandfather     Diabetes Maternal Grandmother     Deep vein thrombosis Other     Colon cancer Other     Malig Hyperthermia Neg Hx     Miscarriages / Stillbirths Neg Hx     Mental  retardation Neg Hx     Mental illness Neg Hx     Learning disabilities Neg Hx     Kidney disease Neg Hx     Hyperlipidemia Neg Hx     Heart disease Neg Hx     Hearing loss Neg Hx     Early death Neg Hx     Drug abuse Neg Hx     Depression Neg Hx     COPD Neg Hx     Cancer Neg Hx     Bleeding Disorder Neg Hx     Birth defects Neg Hx     Asthma Neg Hx     Arthritis Neg Hx     Alcohol abuse Neg Hx     Breast cancer Neg Hx     Ovarian cancer Neg Hx     Uterine cancer Neg Hx     Melanoma Neg Hx     Prostate cancer Neg Hx        Home Medications:  Prior to Admission medications    Medication Sig Start Date End Date Taking? Authorizing Provider   acetaminophen (TYLENOL) 500 MG tablet Take 1 tablet by mouth Every 6 (Six) Hours As Needed for Mild Pain.    Provider, MD Laura   fluticasone (FLONASE) 50 MCG/ACT nasal spray 2 sprays into the nostril(s) as directed by provider Daily for 30 days. 6/21/23 9/11/24  Lesa Britton APRN   guaiFENesin (MUCINEX) 600 MG 12 hr tablet Take 2 tablets by mouth 2 (Two) Times a Day.  Patient not taking: Reported on 10/4/2024 9/18/24   Babak Salamanca DO   prenatal vitamin (prenatal, CLASSIC, vitamin) tablet Take  by mouth Daily.    Provider, MD Laura   sodium chloride 0.65 % nasal spray 1 spray into the nostril(s) as directed by provider As Needed for Congestion.  Patient not taking: Reported on 10/4/2024 9/11/24   Ai Arrington APRN        Social History:   Social History     Tobacco Use    Smoking status: Never     Passive exposure: Never    Smokeless tobacco: Never   Vaping Use    Vaping status: Former    Substances: Nicotine, Flavoring    Devices: Disposable   Substance Use Topics    Alcohol use: Not Currently    Drug use: Never         Review of Systems:  Review of Systems   Constitutional:  Negative for fever.   HENT:  Negative for sore throat.    Eyes: Negative.    Respiratory:  Negative for cough and shortness of breath.    Cardiovascular:  Negative for chest pain.  "  Gastrointestinal:  Positive for constipation. Negative for abdominal pain, diarrhea and vomiting.   Genitourinary:  Negative for dysuria.   Musculoskeletal:  Negative for neck pain.   Skin:  Negative for rash.   Allergic/Immunologic: Negative.    Neurological:  Negative for weakness, numbness and headaches.   Hematological: Negative.    Psychiatric/Behavioral: Negative.     All other systems reviewed and are negative.       Physical Exam:  /73   Pulse 84   Temp 98.3 °F (36.8 °C) (Oral)   Resp 18   Ht 171.5 cm (67.5\")   Wt 123 kg (270 lb 1 oz)   LMP 06/26/2024   SpO2 99%   BMI 41.67 kg/m²     Physical Exam  Vitals and nursing note reviewed.   Constitutional:       General: She is not in acute distress.     Appearance: Normal appearance. She is not toxic-appearing.   HENT:      Head: Normocephalic and atraumatic.      Jaw: There is normal jaw occlusion.   Eyes:      General: Lids are normal.      Extraocular Movements: Extraocular movements intact.      Conjunctiva/sclera: Conjunctivae normal.      Pupils: Pupils are equal, round, and reactive to light.   Cardiovascular:      Rate and Rhythm: Normal rate and regular rhythm.      Pulses: Normal pulses.      Heart sounds: Normal heart sounds.   Pulmonary:      Effort: Pulmonary effort is normal. No respiratory distress.      Breath sounds: Normal breath sounds. No wheezing or rhonchi.   Abdominal:      General: Abdomen is flat.      Palpations: Abdomen is soft.      Tenderness: There is no abdominal tenderness. There is no guarding or rebound.   Musculoskeletal:         General: Normal range of motion.      Cervical back: Normal range of motion and neck supple.      Right lower leg: No edema.      Left lower leg: No edema.   Skin:     General: Skin is warm and dry.   Neurological:      Mental Status: She is alert and oriented to person, place, and time. Mental status is at baseline.   Psychiatric:         Mood and Affect: Mood normal.          "       Procedures:  Procedures      Medical Decision Making:      Comorbidities that affect care:    Obesity, Pregnancy    External Notes reviewed:    Previous Clinic Note: Family medicine office visit from 9/11/2024 patient was seen for annual exam      The following orders were placed and all results were independently analyzed by me:  Orders Placed This Encounter   Procedures    [place] Draw    Comprehensive Metabolic Panel    Lipase    Urinalysis With Microscopic If Indicated (No Culture) - Urine, Clean Catch    hCG, Quantitative, Pregnancy    CBC Auto Differential    NPO Diet NPO Type: Strict NPO    Undress & Gown    Insert Peripheral IV    CBC & Differential    Green Top (Gel)    Lavender Top    Gold Top - SST    Light Blue Top       Medications Given in the Emergency Department:  Medications   sodium chloride 0.9 % flush 10 mL (has no administration in time range)   docusate sodium (ENEMEEZ) enema 1 enema (1 enema Rectal Given 10/19/24 1707)        ED Course:    ED Course as of 10/19/24 1919   Sat Oct 19, 2024   1901 Patient states she is feeling some movement in her bowels and abdominal cramping after receiving the Enemeez, had a very small amount of bowel movement.  Patient states she wishes to be discharged home to attempt to have further bowel movements. [RM]      ED Course User Index  [RM] Kylah Huang APRN       Labs:    Lab Results (last 24 hours)       Procedure Component Value Units Date/Time    CBC & Differential [[id number]]  (Abnormal) Collected: 10/19/24 1556    Specimen: Blood Updated: 10/19/24 1615    Narrative:      The following orders were created for panel order CBC & Differential.  Procedure                               Abnormality         Status                     ---------                               -----------         ------                     CBC Auto Differential[[id number]]        Abnormal            Final result                 Please view results for these tests on the  individual orders.    Comprehensive Metabolic Panel [[id number]]  (Abnormal) Collected: 10/19/24 1556    Specimen: Blood Updated: 10/19/24 1655     Glucose 91 mg/dL      BUN 8 mg/dL      Creatinine 0.43 mg/dL      Sodium 137 mmol/L      Potassium 3.8 mmol/L      Chloride 103 mmol/L      CO2 22.0 mmol/L      Calcium 9.1 mg/dL      Total Protein 7.0 g/dL      Albumin 3.7 g/dL      ALT (SGPT) 11 U/L      AST (SGOT) 11 U/L      Alkaline Phosphatase 44 U/L      Total Bilirubin <0.2 mg/dL      Globulin 3.3 gm/dL      A/G Ratio 1.1 g/dL      BUN/Creatinine Ratio 18.6     Anion Gap 12.0 mmol/L      eGFR 135.2 mL/min/1.73     Narrative:      GFR Normal >60  Chronic Kidney Disease <60  Kidney Failure <15      Lipase [[id number]]  (Normal) Collected: 10/19/24 1556    Specimen: Blood Updated: 10/19/24 1655     Lipase 27 U/L     hCG, Quantitative, Pregnancy [[id number]] Collected: 10/19/24 1556    Specimen: Blood Updated: 10/19/24 1713     HCG Quantitative 12,377.00 mIU/mL     Narrative:      HCG Ranges by Gestational Age    Females - non-pregnant premenopausal   </= 1mIU/mL HCG  Females - postmenopausal               </= 7mIU/mL HCG    3 Weeks       5.4   -      72 mIU/mL  4 Weeks      10.2   -     708 mIU/mL  5 Weeks       217   -   8,245 mIU/mL  6 Weeks       152   -  32,177 mIU/mL  7 Weeks     4,059   - 153,767 mIU/mL  8 Weeks    31,366   - 149,094 mIU/mL  9 Weeks    59,109   - 135,901 mIU/mL  10 Weeks   44,186   - 170,409 mIU/mL  12 Weeks   27,107   - 201,615 mIU/mL  14 Weeks   24,302   -  93,646 mIU/mL  15 Weeks   12,540   -  69,747 mIU/mL  16 Weeks    8,904   -  55,332 mIU/mL  17 Weeks    8,240   -  51,793 mIU/mL  18 Weeks    9,649   -  55,271 mIU/mL      CBC Auto Differential [[id number]]  (Abnormal) Collected: 10/19/24 1556    Specimen: Blood Updated: 10/19/24 1615     WBC 11.08 10*3/mm3      RBC 3.95 10*6/mm3      Hemoglobin 11.8 g/dL      Hematocrit 34.7 %      MCV 87.8 fL      MCH 29.9 pg      MCHC 34.0 g/dL      RDW  13.2 %      RDW-SD 41.9 fl      MPV 9.1 fL      Platelets 241 10*3/mm3      Neutrophil % 73.9 %      Lymphocyte % 18.7 %      Monocyte % 4.5 %      Eosinophil % 1.3 %      Basophil % 0.3 %      Immature Grans % 1.3 %      Neutrophils, Absolute 8.20 10*3/mm3      Lymphocytes, Absolute 2.07 10*3/mm3      Monocytes, Absolute 0.50 10*3/mm3      Eosinophils, Absolute 0.14 10*3/mm3      Basophils, Absolute 0.03 10*3/mm3      Immature Grans, Absolute 0.14 10*3/mm3      nRBC 0.0 /100 WBC              Imaging:    No Radiology Exams Resulted Within Past 24 Hours      Differential Diagnosis and Discussion:    Abdominal Pain: Based on the patient's signs and symptoms, I considered abdominal aortic aneurysm, small bowel obstruction, pancreatitis, acute cholecystitis, acute appendecitis, peptic ulcer disease, gastritis, colitis, endocrine disorders, irritable bowel syndrome and other differential diagnosis an etiology of the patient's abdominal pain.        MDM     Patient presented to the ED with complaints of constipation with last bowel movement reported to be yesterday.  Patient states she has taken 3 doses of a stool softener.  She reports she believes she may have eaten too much food.  Patient has no abdominal tenderness, no vomiting.  Patient was unable to have a significant bowel movement after Enemeez was administered.  Patient states she wishes to be discharged home to try taking lactulose and bisacodyl suppository.  Gave patient strict return precautions and advised her to follow-up with her OB/GYN, also counseled patient to increase the amount of physical activity as well as the amount of fiber and water in her diet.                Patient Care Considerations:    CT ABDOMEN AND PELVIS: I considered ordering a CT scan of the abdomen and pelvis however no abdominal tenderness, no significant abdominal pain.  Also considered ordering abdominal x-ray, however patient declines due to risks of radiation exposure to the  fetus.      Consultants/Shared Management Plan:    None    Social Determinants of Health:    Patient is independent, reliable, and has access to care.       Disposition and Care Coordination:    Discharged: The patient is suitable and stable for discharge with no need for consideration of admission.    I have explained the patient´s condition, diagnoses and treatment plan based on the information available to me at this time. I have answered questions and addressed any concerns. The patient has a good  understanding of the patient´s diagnosis, condition, and treatment plan as can be expected at this point. The vital signs have been stable. The patient´s condition is stable and appropriate for discharge from the emergency department.      The patient will pursue further outpatient evaluation with the primary care physician or other designated or consulting physician as outlined in the discharge instructions. They are agreeable to this plan of care and follow-up instructions have been explained in detail. The patient has received these instructions in written format and has expressed an understanding of the discharge instructions. The patient is aware that any significant change in condition or worsening of symptoms should prompt an immediate return to this or the closest emergency department or call to 911.  I have explained discharge medications and the need for follow up with the patient/caretakers. This was also printed in the discharge instructions. Patient was discharged with the following medications and follow up:      Medication List        New Prescriptions      bisacodyl 10 MG suppository  Commonly known as: DULCOLAX  Insert 1 suppository into the rectum Daily for 5 days.     lactulose 10 GM/15ML solution  Commonly known as: CHRONULAC  Take 30 mL by mouth Daily As Needed (constipation) for up to 5 doses.               Where to Get Your Medications        These medications were sent to [facility]/pharmacy #[id number] -  [place], [place] - [address] - [phone]  - [phone] FX  [address] [place] [address]      Hours: 24-hours Phone: [phone]   bisacodyl 10 MG suppository  lactulose 10 GM/15ML solution      Ai Arrington APRN  [address] [id number] [phone]    Call in 2 days         Final diagnoses:   Constipation during pregnancy in second trimester        ED Disposition       ED Disposition   Discharge    Condition   Stable    Comment   --               This medical record created using voice recognition software.             Kylah Huang APRN  10/19/24 1920

## 2024-10-19 NOTE — DISCHARGE INSTRUCTIONS
Please be sure to increase the amount of fiber in your diet.  Also increase your water intake.  You should also increase your physical activity, try going for a walk daily.  Follow-up with your primary care provider as well as your OB/GYN.  Return to emergency department for worsening symptoms including worsening abdominal pain or vomiting.

## 2024-10-21 NOTE — PROGRESS NOTES
Routine Prenatal Visit     Subjective  Jessie Schultz is a 29 y.o.  at 17w0d here for her routine OB visit.   She is taking her prenatal vitamins.Reports no loss of fluid or vaginal bleeding. Patient doing well without any complaints.Did not have dating US performed.   Pregnancy is complicated by:     Patient Active Problem List   Diagnosis    Arthritis    Back pain    Bilateral carpal tunnel syndrome    Class 3 severe obesity without serious comorbidity with body mass index (BMI) of 50.0 to 59.9 in adult    ETD (eustachian tube dysfunction)    Insulin resistance    Seasonal allergies    Numbness    Pain of right upper extremity    Supervision of normal first pregnancy, antepartum    Obesity affecting pregnancy, antepartum         OB History    Para Term  AB Living   1             SAB IAB Ectopic Molar Multiple Live Births                    # Outcome Date GA Lbr Stevenson/2nd Weight Sex Type Anes PTL Lv   1 Current                    ROS:   General ROS: negative for - chills or fatigue  Genito-Urinary ROS: negative for  change in urinary stream, vaginal discharge     Objective  Physical Exam:   Vitals:    10/23/24 0903   BP: 110/72       Uterine Size: pannus  FHT: 110-160 BPM    General appearance - alert, well appearing, and in no distress  Abdomen- Soft, Gravid uterus, non-tender to palpation  Extremeties: negative swelling       Assessment/Plan:   Diagnoses and all orders for this visit:    1. Supervision of normal first pregnancy, antepartum (Primary)  Assessment & Plan:  DONOVAN finalized: 25 per LMP, dating scan ordered but not done  >>confirm dates with anatomy US    Optional testing NIPS,CF/SMA,AFP:  NIPS ordered     COVID: Fully vaccinated  Tdap:  RSV:  Flu: recommended 24    Rhogam:  1hr Glucola:  FU TPA w glucola:  ? Desires Sterilization:    Anatomy US:ordered  FU US:    PROBLEM LIST/PLAN:   Maternal obesity - plan early 1hGTT, ASA 81 mg daily at 12-14 weeks, ANFS    Orders:  -      POC Urinalysis Dipstick  -     Gestational Diabetes Screen 1 Hour; Future  -     US Ob 14 + Weeks Single or First Gestation; Future  -     Quetakranthi Mastma Prenatal Test: Chromosomes 13, 18, 21, X & Y: Triploidy 22Q.11.2 Deletion - Blood,; Future    2. Obesity affecting pregnancy, antepartum, unspecified obesity type          Counseling:   Second trimester precautions  Round Ligament Pain:  The uterus has several ligaments which provide support and keep the uterus in place. As the  uterus grows these ligaments are pulled and stretched which often causes sharp stabbing like pain in the inguinal area.   You may find a pregnancy support band helpful. Changing positions may also help. Yoga is a great way to cope with round ligament and low back pain in pregnancy.    Massage may also help with low back pain   Things to Consider at this Point in your Pregnancy:  Some women experience swelling in their feet during pregnancy. Compression stockings may help  Drink plenty of water and stay active   Make sure you are eating frequent small meals, nuts are a wonderful snack to keep with you            Return in about 4 weeks (around 11/20/2024) for Routine OB visit.      We have gone over prenatal care to include the timing and content of visits. I informed her how to contact the office and/or on call person in the event of any problems and encouraged her to do so when she feels it is necessary.  We then spent time answering her questions which she indicated were answered to her satisfaction.    Roselia Dutton DO  10/23/2024 09:49 EDT

## 2024-10-23 ENCOUNTER — ROUTINE PRENATAL (OUTPATIENT)
Dept: OBSTETRICS AND GYNECOLOGY | Facility: CLINIC | Age: 29
End: 2024-10-23
Payer: COMMERCIAL

## 2024-10-23 VITALS — WEIGHT: 267.4 LBS | DIASTOLIC BLOOD PRESSURE: 72 MMHG | BODY MASS INDEX: 41.26 KG/M2 | SYSTOLIC BLOOD PRESSURE: 110 MMHG

## 2024-10-23 DIAGNOSIS — Z34.00 SUPERVISION OF NORMAL FIRST PREGNANCY, ANTEPARTUM: Primary | ICD-10-CM

## 2024-10-23 DIAGNOSIS — O99.210 OBESITY AFFECTING PREGNANCY, ANTEPARTUM, UNSPECIFIED OBESITY TYPE: ICD-10-CM

## 2024-10-23 LAB
GLUCOSE 1H P GLC SERPL-MCNC: 106 MG/DL (ref 65–139)
GLUCOSE UR STRIP-MCNC: NEGATIVE MG/DL
PROT UR STRIP-MCNC: NEGATIVE MG/DL

## 2024-10-23 PROCEDURE — 82950 GLUCOSE TEST: CPT | Performed by: STUDENT IN AN ORGANIZED HEALTH CARE EDUCATION/TRAINING PROGRAM

## 2024-10-23 NOTE — ASSESSMENT & PLAN NOTE
DONOVAN finalized: 4/2/25 per LMP, dating scan ordered but not done  >>confirm dates with anatomy US    Optional testing NIPS,CF/SMA,AFP:  NIPS ordered     COVID: Fully vaccinated  Tdap:  RSV:  Flu: recommended 9/20/24    Rhogam:  1hr Glucola:  FU TPA w glucola:  ? Desires Sterilization:    Anatomy US:ordered  FU US:    PROBLEM LIST/PLAN:   Maternal obesity - plan early 1hGTT, ASA 81 mg daily at 12-14 weeks, ANFS

## 2024-10-30 ENCOUNTER — TELEPHONE (OUTPATIENT)
Dept: OBSTETRICS AND GYNECOLOGY | Facility: CLINIC | Age: 29
End: 2024-10-30

## 2024-10-30 LAB
Lab: NORMAL
NTRA FETAL FRACTION: NORMAL
NTRA GENDER OF FETUS: NORMAL
NTRA MONOSOMY X AGE-BASED RISK TEXT: NORMAL
NTRA MONOSOMY X RESULT TEXT: NORMAL
NTRA MONOSOMY X RISK SCORE TEXT: NORMAL
NTRA TRIPLOIDY RESULT TEXT: NORMAL
NTRA TRISOMY 13 AGE-BASED RISK TEXT: NORMAL
NTRA TRISOMY 13 RESULT TEXT: NORMAL
NTRA TRISOMY 13 RISK SCORE TEXT: NORMAL
NTRA TRISOMY 18 AGE-BASED RISK TEXT: NORMAL
NTRA TRISOMY 18 RESULT TEXT: NORMAL
NTRA TRISOMY 18 RISK SCORE TEXT: NORMAL
NTRA TRISOMY 21 AGE-BASED RISK TEXT: NORMAL
NTRA TRISOMY 21 RESULT TEXT: NORMAL
NTRA TRISOMY 21 RISK SCORE TEXT: NORMAL

## 2024-10-30 NOTE — TELEPHONE ENCOUNTER
Spoke with patient and gave her results/recommendations that were requested. No follow up needed at this time.

## 2024-10-30 NOTE — TELEPHONE ENCOUNTER
Caller: Jessie Schultz    Relationship: Self    Best call back number: [phone] (home)       Caller requesting test results: YES    What test was performed: UA, GTT,     When was the test performed: 10/23/24    Where was the test performed: OFFICE    Additional notes: WOULD LIKE SOMEONE TO CALL HER WITH RESULTS

## 2024-10-31 ENCOUNTER — TELEPHONE (OUTPATIENT)
Dept: OBSTETRICS AND GYNECOLOGY | Facility: CLINIC | Age: 29
End: 2024-10-31

## 2024-10-31 NOTE — TELEPHONE ENCOUNTER
Caller: Jessie Schultz    Relationship to patient: Self    Best call back number: [phone]    Chief complaint: WILL BE OUT OF TOWN THE 20TH     Type of visit: OB AND U/S     Requested date: 11/9-15TH    If rescheduling, when is the original appointment: 11/20

## 2024-11-12 NOTE — PROGRESS NOTES
Routine Prenatal Visit     Subjective  Jessie Schultz is a 29 y.o.  at 20w1d here for her routine OB visit.   She is taking her prenatal vitamins.Reports no loss of fluid or vaginal bleeding. Patient doing well without any complaints. Pregnancy is complicated by:     Patient Active Problem List   Diagnosis    Arthritis    Back pain    Bilateral carpal tunnel syndrome    Class 3 severe obesity without serious comorbidity with body mass index (BMI) of 50.0 to 59.9 in adult    ETD (eustachian tube dysfunction)    Insulin resistance    Seasonal allergies    Numbness    Pain of right upper extremity    Supervision of normal first pregnancy, antepartum    Obesity affecting pregnancy, antepartum         OB History    Para Term  AB Living   1             SAB IAB Ectopic Molar Multiple Live Births                    # Outcome Date GA Lbr Stevenson/2nd Weight Sex Type Anes PTL Lv   1 Current                    ROS:    General ROS: negative for - chills or fatigue  Genito-Urinary ROS: negative for  change in urinary stream, vaginal discharge     Objective  Physical Exam:    Vitals:    24 1411   BP: 118/77       Uterine Size: not examined, US today  FHT: 110-160 BPM     General appearance - alert, well appearing, and in no distress  Abdomen- Soft, Gravid uterus, non-tender to palpation  Extremeties: negative swelling       Assessment/Plan:   Diagnoses and all orders for this visit:    1. Supervision of normal first pregnancy, antepartum (Primary)  Assessment & Plan:  DONOVAN finalized: 25 per LMP, dating scan ordered but not done  >>dating confirmed with anatomy and ACOG    Optional testing NIPS,CF/SMA,AFP:  NIPS neg XX    COVID: Fully vaccinated  Tdap:  RSV:  Flu: recommended 24    Rhogam:  1hr Glucola:  FU TPA w glucola:  ? Desires Sterilization:    Anatomy US:  EFW 92%, AC 84%, transverse, anterior grade 1 placenta, limited normal anatomy, female, suboptimal heart, spine and kidney views  FU  US:    PROBLEM LIST/PLAN:   Maternal obesity - normal early 1hGTT, ASA 81 mg daily at 12-14 weeks, ANFS    Orders:  -     POC Urinalysis Dipstick  -     US Ob 14 + Weeks Single or First Gestation; Future    2. Obesity affecting pregnancy, antepartum, unspecified obesity type          Counseling:   Second trimester precautions  Round Ligament Pain:  The uterus has several ligaments which provide support and keep the uterus in place. As the  uterus grows these ligaments are pulled and stretched which often causes sharp stabbing like pain in the inguinal area.   You may find a pregnancy support band helpful. Changing positions may also help. Yoga is a great way to cope with round ligament and low back pain in pregnancy.    Massage may also help with low back pain   Things to Consider at this Point in your Pregnancy:  Some women experience swelling in their feet during pregnancy. Compression stockings may help  Drink plenty of water and stay active   Make sure you are eating frequent small meals, nuts are a wonderful snack to keep with you            Return in about 4 weeks (around 12/12/2024) for Routine OB visit.      We have gone over prenatal care to include the timing and content of visits. I informed her how to contact the office and/or on call person in the event of any problems and encouraged her to do so when she feels it is necessary.  We then spent time answering her questions which she indicated were answered to her satisfaction.    Roselia Dutton,   11/15/2024 08:30 EST

## 2024-11-14 ENCOUNTER — ROUTINE PRENATAL (OUTPATIENT)
Dept: OBSTETRICS AND GYNECOLOGY | Facility: CLINIC | Age: 29
End: 2024-11-14
Payer: COMMERCIAL

## 2024-11-14 ENCOUNTER — PATIENT OUTREACH (OUTPATIENT)
Dept: LABOR AND DELIVERY | Facility: HOSPITAL | Age: 29
End: 2024-11-14
Payer: COMMERCIAL

## 2024-11-14 VITALS — BODY MASS INDEX: 41.82 KG/M2 | SYSTOLIC BLOOD PRESSURE: 118 MMHG | DIASTOLIC BLOOD PRESSURE: 77 MMHG | WEIGHT: 271 LBS

## 2024-11-14 DIAGNOSIS — O99.210 OBESITY AFFECTING PREGNANCY, ANTEPARTUM, UNSPECIFIED OBESITY TYPE: ICD-10-CM

## 2024-11-14 DIAGNOSIS — Z34.00 SUPERVISION OF NORMAL FIRST PREGNANCY, ANTEPARTUM: Primary | ICD-10-CM

## 2024-11-14 LAB
GLUCOSE UR STRIP-MCNC: NEGATIVE MG/DL
PROT UR STRIP-MCNC: NEGATIVE MG/DL

## 2024-11-14 NOTE — OUTREACH NOTE
[facility]  Enrollment    Current Estimated Gestational Age: 20w1d    Questions/Answers      Flowsheet Row Responses   Would like to participate? Yes        [facility]  Intake    Current Estimated Gestational Age: 20w1d    Intake Assessment      Flowsheet Row Responses   Best Method for Contacting Cell   Currently Employed Yes  [getting ready to start job at [employer]]   Able to keep appointments as scheduled Yes   Gender(s) and Name(s) Girl - Sonia   Resources Presently Utilizing: WIC (Women, Infant, Children), HANDS   Maternal Warning Signs Provided   Other: Provided   Other Education WIC Benefits, Insurance benefits/Incentives, HANDS            Learning Assessment      Flowsheet Row Responses   Relationship Patient, Family   Does the learner have any barriers to learning? No Barriers   What is the preferred language of the learner for medical teaching? English   Is an  required? No            Pediatrician:   FOB:   Feeding Plan: breast feeding    No current concerns with food, housing or transportation.  Encouraged to reach out for any questions, needs or concerns.    Tobacco, Alcohol, and Drug History     reports that she has never smoked. She has never been exposed to tobacco smoke. She has never used smokeless tobacco.   reports that she does not currently use alcohol.   reports no history of drug use.    Ai Trevizo RN  Maternity Nurse Navigator    11/14/2024, 13:51 EST

## 2024-11-15 NOTE — ASSESSMENT & PLAN NOTE
DONOVAN finalized: 4/2/25 per LMP, dating scan ordered but not done  >>dating confirmed with anatomy and ACOG    Optional testing NIPS,CF/SMA,AFP:  NIPS neg XX    COVID: Fully vaccinated  Tdap:  RSV:  Flu: recommended 9/20/24    Rhogam:  1hr Glucola:  FU TPA w glucola:  ? Desires Sterilization:    Anatomy US: 11/14 EFW 92%, AC 84%, transverse, anterior grade 1 placenta, limited normal anatomy, female, suboptimal heart, spine and kidney views  FU US:    PROBLEM LIST/PLAN:   Maternal obesity - normal early 1hGTT, ASA 81 mg daily at 12-14 weeks, ANFS

## 2024-12-11 ENCOUNTER — ROUTINE PRENATAL (OUTPATIENT)
Dept: OBSTETRICS AND GYNECOLOGY | Facility: CLINIC | Age: 29
End: 2024-12-11
Payer: COMMERCIAL

## 2024-12-11 ENCOUNTER — PATIENT OUTREACH (OUTPATIENT)
Dept: LABOR AND DELIVERY | Facility: HOSPITAL | Age: 29
End: 2024-12-11
Payer: COMMERCIAL

## 2024-12-11 VITALS — BODY MASS INDEX: 44.13 KG/M2 | DIASTOLIC BLOOD PRESSURE: 86 MMHG | WEIGHT: 286 LBS | SYSTOLIC BLOOD PRESSURE: 139 MMHG

## 2024-12-11 DIAGNOSIS — Z23 INFLUENZA VACCINE ADMINISTERED: ICD-10-CM

## 2024-12-11 DIAGNOSIS — Z34.00 SUPERVISION OF NORMAL FIRST PREGNANCY, ANTEPARTUM: Primary | ICD-10-CM

## 2024-12-11 DIAGNOSIS — Z23 FLU VACCINE NEED: ICD-10-CM

## 2024-12-11 DIAGNOSIS — O99.210 OBESITY AFFECTING PREGNANCY, ANTEPARTUM, UNSPECIFIED OBESITY TYPE: ICD-10-CM

## 2024-12-11 PROBLEM — S82.892A ANKLE FRACTURE, LEFT: Status: RESOLVED | Noted: 2024-12-11 | Resolved: 2024-12-11

## 2024-12-11 PROBLEM — J30.2 SEASONAL ALLERGIES: Status: RESOLVED | Noted: 2022-08-24 | Resolved: 2024-12-11

## 2024-12-11 PROBLEM — M79.601 PAIN OF RIGHT UPPER EXTREMITY: Status: RESOLVED | Noted: 2023-01-26 | Resolved: 2024-12-11

## 2024-12-11 LAB
DEPRECATED RDW RBC AUTO: 36.2 FL (ref 37–54)
ERYTHROCYTE [DISTWIDTH] IN BLOOD BY AUTOMATED COUNT: 11.3 % (ref 12.3–15.4)
GLUCOSE 1H P GLC SERPL-MCNC: 103 MG/DL (ref 65–139)
GLUCOSE UR STRIP-MCNC: NEGATIVE MG/DL
HCT VFR BLD AUTO: 35.9 % (ref 34–46.6)
HGB BLD-MCNC: 12.3 G/DL (ref 12–15.9)
MCH RBC QN AUTO: 30.3 PG (ref 26.6–33)
MCHC RBC AUTO-ENTMCNC: 34.3 G/DL (ref 31.5–35.7)
MCV RBC AUTO: 88.4 FL (ref 79–97)
PLATELET # BLD AUTO: 259 10*3/MM3 (ref 140–450)
PMV BLD AUTO: 9.6 FL (ref 6–12)
PROT UR STRIP-MCNC: NEGATIVE MG/DL
RBC # BLD AUTO: 4.06 10*6/MM3 (ref 3.77–5.28)
WBC NRBC COR # BLD AUTO: 11.18 10*3/MM3 (ref 3.4–10.8)

## 2024-12-11 PROCEDURE — 85027 COMPLETE CBC AUTOMATED: CPT | Performed by: OBSTETRICS & GYNECOLOGY

## 2024-12-11 PROCEDURE — 82950 GLUCOSE TEST: CPT | Performed by: OBSTETRICS & GYNECOLOGY

## 2024-12-11 NOTE — OUTREACH NOTE
[facility]  Check-In    Current Estimated Gestational Age: 24w0d      Questions/Answers      Flowsheet Row Responses   Best Method for Contacting Cell   Demographics Reviewed Yes   Currently Employed Yes   Able to keep appointments as scheduled Yes   Baby Active/Feeling Fetal Movemen Yes   How are you presently feeling? Lower pelvic pain/cramping   Resource/Environmental Concerns None   Do you have any questions related to your care experience, your pregnancy, plans for delivery, any concerns, etc? No            Concerns about lower pelvic pain since last night. Constant. Discussed round ligament pain and encouraged to be evaluated if pain becomes worse. No other questions,  needs or concerns.       Ai Trevizo RN  Maternity Nurse Navigator    12/11/2024, 11:08 EST

## 2024-12-11 NOTE — ASSESSMENT & PLAN NOTE
Reviewed today's follow-up anatomy.  The anatomy is normal.  The EFW is at the 96 percentile.  Will consider repeating growth ultrasound in the third trimester.  STEFANIA is normal.  1 hour GTT today  Continue prenatal vitamins  Fetal kick counts   labor warnings  Flu vaccine today

## 2024-12-11 NOTE — PROGRESS NOTES
[facility]  OB Follow Up Visit    CC: Routine obstetrical visit    Prenatal care complicated by:  Patient Active Problem List   Diagnosis    Arthritis    Back pain    Bilateral carpal tunnel syndrome    Class 3 severe obesity without serious comorbidity with body mass index (BMI) of 50.0 to 59.9 in adult    ETD (eustachian tube dysfunction)    Insulin resistance    Numbness    Supervision of normal first pregnancy, antepartum    Obesity affecting pregnancy, antepartum     Subjective:   Jessie Schultz is a 29 y.o.  24w0d patient being seen today for her obstetrical follow up visit. The patient has: No complaints, No leaking fluid, No vaginal bleeding, No contractions, Adequate FM    History: Past medical and surgical history, medications, allergies, social history, and obstetrical history all reviewed and updated.    Objective:    Urine glucose/protein - See OB flow sheet      /86   Wt 130 kg (286 lb)   LMP 2024   BMI 44.13 kg/m²     General exam: Comfortable, NAD  FHR: 161 BPM   Uterine Size:  26 cm  Pelvic Exam: No    Assessment and Plan:  Diagnoses and all orders for this visit:    1. Supervision of normal first pregnancy, antepartum (Primary)  Overview:  DONOVAN finalized: 25 per LMP, dating scan ordered but not done  >>dating confirmed with anatomy and ACOG    Optional testing NIPS,CF/SMA,AFP:  NIPS neg XX    COVID: Fully vaccinated  Tdap:  RSV:  Flu: Done 24    Anatomy US:  EFW 92%, AC 84%, transverse, anterior grade 1 placenta, limited normal anatomy, female, suboptimal heart, spine and kidney views  FU US: 2024: the anatomy is normal.  The EFW is at the 96 percentile.  Will consider repeating growth ultrasound in the third trimester.  STEFANIA is normal.       Assessment & Plan:  Reviewed today's follow-up anatomy.  The anatomy is normal.  The EFW is at the 96 percentile.  Will consider repeating growth ultrasound in the third trimester.  STEFANIA is normal.  1  hour GTT today  Continue prenatal vitamins  Fetal kick counts   labor warnings  Flu vaccine today    Orders:  -     POC Urinalysis Dipstick  -     CBC (No Diff); Future  -     Gestational Diabetes Screen 1 Hour; Future    2. Obesity affecting pregnancy, antepartum, unspecified obesity type  Overview:  Plan early 1hGTT  ASA 81 mg at 12-14 weeks  ANFS      3. Flu vaccine need  -     Fluzone >6mos ([id number])      24w0d  Reassuring pregnancy progress    Counseling: OB precautions, leaking, VB, jyoti killian vs PTL/Labor  FKC    Questions answered    Return in about 4 weeks (around 2025) for Recheck.    Vitaliy Modi MD  2024

## 2024-12-25 ENCOUNTER — HOSPITAL ENCOUNTER (EMERGENCY)
Facility: HOSPITAL | Age: 29
Discharge: LEFT BEFORE DISCHARGE | End: 2024-12-25
Attending: EMERGENCY MEDICINE | Admitting: EMERGENCY MEDICINE
Payer: COMMERCIAL

## 2024-12-25 ENCOUNTER — HOSPITAL ENCOUNTER (OUTPATIENT)
Facility: HOSPITAL | Age: 29
Discharge: HOME | End: 2024-12-26
Attending: OBSTETRICS & GYNECOLOGY | Admitting: OBSTETRICS & GYNECOLOGY
Payer: COMMERCIAL

## 2024-12-25 VITALS
WEIGHT: 293 LBS | SYSTOLIC BLOOD PRESSURE: 126 MMHG | DIASTOLIC BLOOD PRESSURE: 72 MMHG | HEIGHT: 67 IN | BODY MASS INDEX: 45.99 KG/M2 | HEART RATE: 104 BPM | OXYGEN SATURATION: 99 % | RESPIRATION RATE: 18 BRPM | TEMPERATURE: 100.3 F

## 2024-12-25 PROCEDURE — G0463 HOSPITAL OUTPT CLINIC VISIT: HCPCS

## 2024-12-25 PROCEDURE — 99211 OFF/OP EST MAY X REQ PHY/QHP: CPT

## 2024-12-26 ENCOUNTER — HOSPITAL ENCOUNTER (EMERGENCY)
Facility: HOSPITAL | Age: 29
Discharge: HOME | End: 2024-12-26
Attending: EMERGENCY MEDICINE
Payer: COMMERCIAL

## 2024-12-26 VITALS
TEMPERATURE: 97.8 F | OXYGEN SATURATION: 99 % | RESPIRATION RATE: 16 BRPM | HEART RATE: 90 BPM | SYSTOLIC BLOOD PRESSURE: 113 MMHG | WEIGHT: 288.36 LBS | HEIGHT: 67 IN | DIASTOLIC BLOOD PRESSURE: 67 MMHG | BODY MASS INDEX: 45.26 KG/M2

## 2024-12-26 VITALS
OXYGEN SATURATION: 95 % | TEMPERATURE: 98.4 F | SYSTOLIC BLOOD PRESSURE: 112 MMHG | HEART RATE: 102 BPM | RESPIRATION RATE: 18 BRPM | DIASTOLIC BLOOD PRESSURE: 64 MMHG

## 2024-12-26 DIAGNOSIS — Z34.92 SECOND TRIMESTER PREGNANCY: ICD-10-CM

## 2024-12-26 DIAGNOSIS — R11.2 NAUSEA VOMITING AND DIARRHEA: Primary | ICD-10-CM

## 2024-12-26 DIAGNOSIS — R19.7 NAUSEA VOMITING AND DIARRHEA: Primary | ICD-10-CM

## 2024-12-26 LAB
ALBUMIN SERPL-MCNC: 3.6 G/DL (ref 3.5–5.2)
ALBUMIN/GLOB SERPL: 1.2 G/DL
ALP SERPL-CCNC: 60 U/L (ref 39–117)
ALT SERPL W P-5'-P-CCNC: 10 U/L (ref 1–33)
ANION GAP SERPL CALCULATED.3IONS-SCNC: 13 MMOL/L (ref 5–15)
AST SERPL-CCNC: 11 U/L (ref 1–32)
BACTERIA UR QL AUTO: ABNORMAL /HPF
BASOPHILS # BLD AUTO: 0.05 10*3/MM3 (ref 0–0.2)
BASOPHILS NFR BLD AUTO: 0.5 % (ref 0–1.5)
BILIRUB BLD-MCNC: NEGATIVE MG/DL
BILIRUB SERPL-MCNC: 0.2 MG/DL (ref 0–1.2)
BILIRUB UR QL STRIP: NEGATIVE
BUN SERPL-MCNC: 11 MG/DL (ref 6–20)
BUN/CREAT SERPL: 27.5 (ref 7–25)
CALCIUM SPEC-SCNC: 8.9 MG/DL (ref 8.6–10.5)
CHLORIDE SERPL-SCNC: 103 MMOL/L (ref 98–107)
CLARITY UR: CLEAR
CLARITY, POC: ABNORMAL
CO2 SERPL-SCNC: 21 MMOL/L (ref 22–29)
COLOR UR: ABNORMAL
COLOR UR: YELLOW
CREAT SERPL-MCNC: 0.4 MG/DL (ref 0.57–1)
DEPRECATED RDW RBC AUTO: 38.5 FL (ref 37–54)
EGFRCR SERPLBLD CKD-EPI 2021: 137.6 ML/MIN/1.73
EOSINOPHIL # BLD AUTO: 0.04 10*3/MM3 (ref 0–0.4)
EOSINOPHIL NFR BLD AUTO: 0.4 % (ref 0.3–6.2)
ERYTHROCYTE [DISTWIDTH] IN BLOOD BY AUTOMATED COUNT: 12.2 % (ref 12.3–15.4)
GLOBULIN UR ELPH-MCNC: 3.1 GM/DL
GLUCOSE SERPL-MCNC: 103 MG/DL (ref 65–99)
GLUCOSE UR STRIP-MCNC: NEGATIVE MG/DL
GLUCOSE UR STRIP-MCNC: NEGATIVE MG/DL
HCG INTACT+B SERPL-ACNC: NORMAL MIU/ML
HCT VFR BLD AUTO: 34.5 % (ref 34–46.6)
HGB BLD-MCNC: 11.6 G/DL (ref 12–15.9)
HGB UR QL STRIP.AUTO: NEGATIVE
HOLD SPECIMEN: NORMAL
HOLD SPECIMEN: NORMAL
HYALINE CASTS UR QL AUTO: ABNORMAL /LPF
IMM GRANULOCYTES # BLD AUTO: 0.15 10*3/MM3 (ref 0–0.05)
IMM GRANULOCYTES NFR BLD AUTO: 1.5 % (ref 0–0.5)
KETONES UR QL STRIP: ABNORMAL
KETONES UR QL: NEGATIVE
LEUKOCYTE EST, POC: NEGATIVE
LEUKOCYTE ESTERASE UR QL STRIP.AUTO: ABNORMAL
LIPASE SERPL-CCNC: 31 U/L (ref 13–60)
LYMPHOCYTES # BLD AUTO: 0.6 10*3/MM3 (ref 0.7–3.1)
LYMPHOCYTES NFR BLD AUTO: 5.8 % (ref 19.6–45.3)
MCH RBC QN AUTO: 29 PG (ref 26.6–33)
MCHC RBC AUTO-ENTMCNC: 33.6 G/DL (ref 31.5–35.7)
MCV RBC AUTO: 86.3 FL (ref 79–97)
MONOCYTES # BLD AUTO: 0.27 10*3/MM3 (ref 0.1–0.9)
MONOCYTES NFR BLD AUTO: 2.6 % (ref 5–12)
NEUTROPHILS NFR BLD AUTO: 89.2 % (ref 42.7–76)
NEUTROPHILS NFR BLD AUTO: 9.2 10*3/MM3 (ref 1.7–7)
NITRITE UR QL STRIP: NEGATIVE
NITRITE UR-MCNC: NEGATIVE MG/ML
NRBC BLD AUTO-RTO: 0 /100 WBC (ref 0–0.2)
PH UR STRIP.AUTO: 8.5 [PH] (ref 5–8)
PH UR: 7 [PH] (ref 5–8)
PLATELET # BLD AUTO: 225 10*3/MM3 (ref 140–450)
PMV BLD AUTO: 9 FL (ref 6–12)
POTASSIUM SERPL-SCNC: 3.8 MMOL/L (ref 3.5–5.2)
PROT SERPL-MCNC: 6.7 G/DL (ref 6–8.5)
PROT UR QL STRIP: ABNORMAL
PROT UR STRIP-MCNC: ABNORMAL MG/DL
RBC # BLD AUTO: 4 10*6/MM3 (ref 3.77–5.28)
RBC # UR STRIP: ABNORMAL /HPF
RBC # UR STRIP: NEGATIVE /UL
REF LAB TEST METHOD: ABNORMAL
SODIUM SERPL-SCNC: 137 MMOL/L (ref 136–145)
SP GR UR STRIP: 1.03 (ref 1–1.03)
SP GR UR: 1.02 (ref 1–1.03)
SQUAMOUS #/AREA URNS HPF: ABNORMAL /HPF
UROBILINOGEN UR QL STRIP: ABNORMAL
UROBILINOGEN UR QL: ABNORMAL
WBC # UR STRIP: ABNORMAL /HPF
WBC NRBC COR # BLD AUTO: 10.31 10*3/MM3 (ref 3.4–10.8)
WHOLE BLOOD HOLD COAG: NORMAL
WHOLE BLOOD HOLD SPECIMEN: NORMAL

## 2024-12-26 PROCEDURE — 59025 FETAL NON-STRESS TEST: CPT

## 2024-12-26 PROCEDURE — 25810000003 SODIUM CHLORIDE 0.9 % SOLUTION: Performed by: EMERGENCY MEDICINE

## 2024-12-26 PROCEDURE — 81002 URINALYSIS NONAUTO W/O SCOPE: CPT | Performed by: OBSTETRICS & GYNECOLOGY

## 2024-12-26 PROCEDURE — 84702 CHORIONIC GONADOTROPIN TEST: CPT | Performed by: EMERGENCY MEDICINE

## 2024-12-26 PROCEDURE — 87086 URINE CULTURE/COLONY COUNT: CPT | Performed by: EMERGENCY MEDICINE

## 2024-12-26 PROCEDURE — 85025 COMPLETE CBC W/AUTO DIFF WBC: CPT | Performed by: EMERGENCY MEDICINE

## 2024-12-26 PROCEDURE — 83690 ASSAY OF LIPASE: CPT | Performed by: EMERGENCY MEDICINE

## 2024-12-26 PROCEDURE — 25010000002 ONDANSETRON PER 1 MG: Performed by: EMERGENCY MEDICINE

## 2024-12-26 PROCEDURE — 80053 COMPREHEN METABOLIC PANEL: CPT | Performed by: EMERGENCY MEDICINE

## 2024-12-26 PROCEDURE — 96374 THER/PROPH/DIAG INJ IV PUSH: CPT

## 2024-12-26 PROCEDURE — 63710000001 ONDANSETRON ODT 4 MG TABLET DISPERSIBLE: Performed by: OBSTETRICS & GYNECOLOGY

## 2024-12-26 PROCEDURE — 81001 URINALYSIS AUTO W/SCOPE: CPT | Performed by: EMERGENCY MEDICINE

## 2024-12-26 PROCEDURE — G0463 HOSPITAL OUTPT CLINIC VISIT: HCPCS

## 2024-12-26 PROCEDURE — 99283 EMERGENCY DEPT VISIT LOW MDM: CPT

## 2024-12-26 RX ORDER — ONDANSETRON 4 MG/1
4 TABLET, ORALLY DISINTEGRATING ORAL ONCE
Status: COMPLETED | OUTPATIENT
Start: 2024-12-26 | End: 2024-12-26

## 2024-12-26 RX ORDER — ONDANSETRON 2 MG/ML
4 INJECTION INTRAMUSCULAR; INTRAVENOUS ONCE
Status: COMPLETED | OUTPATIENT
Start: 2024-12-26 | End: 2024-12-26

## 2024-12-26 RX ORDER — ONDANSETRON 8 MG/1
8 TABLET, FILM COATED ORAL EVERY 8 HOURS PRN
Qty: 20 TABLET | Refills: 0 | Status: SHIPPED | OUTPATIENT
Start: 2024-12-26

## 2024-12-26 RX ORDER — SODIUM CHLORIDE 0.9 % (FLUSH) 0.9 %
10 SYRINGE (ML) INJECTION AS NEEDED
Status: DISCONTINUED | OUTPATIENT
Start: 2024-12-26 | End: 2024-12-26 | Stop reason: HOSPADM

## 2024-12-26 RX ADMIN — ONDANSETRON 4 MG: 2 INJECTION INTRAMUSCULAR; INTRAVENOUS at 08:36

## 2024-12-26 RX ADMIN — ONDANSETRON 4 MG: 4 TABLET, ORALLY DISINTEGRATING ORAL at 01:05

## 2024-12-26 RX ADMIN — SODIUM CHLORIDE 1000 ML: 9 INJECTION, SOLUTION INTRAVENOUS at 08:36

## 2024-12-26 NOTE — OBED NOTES
[facility]  Obstetric History and Physical    Chief Complaint   Patient presents with    Abdominal Pain    Vomiting       Subjective     Patient is a 29 y.o. female  currently at 26w1d, who presents with complaint of lower abdominal pain, nausea and vomiting.  She reports having 2 episodes of vomiting.  Positive fetal movements.  She denies any loss of fluid or vaginal bleeding.    PNC provided by:  [facility]. Her prenatal care is benign.  Her previous obstetric/gynecological history is noted for is non-contributory.    The following portions of the patients history were reviewed and updated as appropriate: current medications, allergies, past medical history, past surgical history, past family history, and past social history .       Prenatal Information:  Prenatal Results       Initial Prenatal Labs       Test Value Reference Range Date Time    Hemoglobin  11.8 g/dL 12.0 - 15.9 10/19/24 1556       12.7 g/dL 12.0 - 15.9 24 1005       12.7 g/dL 12.0 - 15.9 24 0915    Hematocrit  34.7 % 34.0 - 46.6 10/19/24 1556       38.6 % 34.0 - 46.6 24 1005       36.9 % 34.0 - 46.6 24 0915    Platelets  241 10*3/mm3 140 - 450 10/19/24 1556       262 10*3/mm3 140 - 450 24 1005       248 10*3/mm3 140 - 450 24 0915    Rubella IgG  1.79 index Immune >0.99 24 1005    Hepatitis B SAg  Non-Reactive  Non-Reactive 24 1005    Hepatitis C Ab  Non-Reactive  Non-Reactive 24 1005       Non-Reactive  Non-Reactive 24 0915    RPR  Non-Reactive  Non-Reactive 24 1005    T. Pallidum Ab         ABO  A   24 1005    Rh  Positive   24 1005    Antibody Screen  Negative   24 1005    HIV  Non-Reactive  Non-Reactive 24 1005    Urine Culture  >100,000 CFU/mL Mixed Vania Isolated   24 1005    Gonorrhea  Negative  Negative 24 1005    Chlamydia  Negative  Negative 24 1005    TSH  1.160 uIU/mL 0.270 - 4.200 24 0915    HgB A1c   5.00 % 4.80 - 5.60  09/20/24 1005    Varicella IgG        Hemoglobinopathy Fractionation  Comment   09/20/24 1005    Hemoglobinopathy (genetic testing)        Cystic fibrosis         Spinal muscular atrophy        Fragile X                  Fetal testing        Test Value Reference Range Date Time    NIPT        MSAFP        AFP-4                  2nd and 3rd Trimester       Test Value Reference Range Date Time    Hemoglobin (repeated)  12.3 g/dL 12.0 - 15.9 12/11/24 1003    Hematocrit (repeated)  35.9 % 34.0 - 46.6 12/11/24 1003    Platelets   259 10*3/mm3 140 - 450 12/11/24 1003       241 10*3/mm3 140 - 450 10/19/24 1556       262 10*3/mm3 140 - 450 09/20/24 1005       248 10*3/mm3 140 - 450 09/11/24 0915    1 hour GTT   103 mg/dL 65 - 139 12/11/24 1003       106 mg/dL 65 - 139 10/23/24 1004    Antibody Screen (repeated)        3rd TM syphilis scrn (repeated)  RPR         3rd TM syphilis scrn (repeated) TP-Ab        3rd TM syphilis screen TB-Ab (FTA)        Syphilis cascade test TP-Ab (EIA)        Syphilis cascade TPPA        GTT Fasting        GTT 1 Hr        GTT 2 Hr        GTT 3 Hr        Group B Strep                  Other testing        Test Value Reference Range Date Time    Parvo IgG         CMV IgG                   Drug Screening       Test Value Reference Range Date Time    Amphetamine Screen        Barbiturate Screen  Negative  Negative 09/20/24 1005    Benzodiazepine Screen  Negative  Negative 09/20/24 1005    Methadone Screen  Negative  Negative 09/20/24 1005    Phencyclidine Screen        Opiates Screen  Negative  Negative 09/20/24 1005    THC Screen  Negative  Negative 09/20/24 1005    Cocaine Screen  Negative  Negative 09/20/24 1005    Propoxyphene Screen        Buprenorphine Screen        Methamphetamine Screen        Oxycodone Screen  Negative  Negative 09/20/24 1005    Tricyclic Antidepressants Screen                  Legend    ^: Historical                          External Prenatal Results       Pregnancy  Outside Results - Transcribed From Office Records - See Scanned Records For Details       Test Value Date Time    ABO  A  09/20/24 1005    Rh  Positive  09/20/24 1005    Antibody Screen  Negative  09/20/24 1005    Varicella IgG       Rubella  1.79 index 09/20/24 1005    Hgb  12.3 g/dL 12/11/24 1003       11.8 g/dL 10/19/24 1556       12.7 g/dL 09/20/24 1005       12.7 g/dL 09/11/24 0915    Hct  35.9 % 12/11/24 1003       34.7 % 10/19/24 1556       38.6 % 09/20/24 1005       36.9 % 09/11/24 0915    HgB A1c   5.00 % 09/20/24 1005    1h GTT  103 mg/dL 12/11/24 1003       106 mg/dL 10/23/24 1004    3h GTT Fasting       3h GTT 1 hour       3h GTT 2 hour       3h GTT 3 hour        Gonorrhea (discrete)  Negative  09/20/24 1005    Chlamydia (discrete)  Negative  09/20/24 1005    RPR  Non-Reactive  09/20/24 1005    Syphils cascade: TP-Ab (FTA)       TP-Ab       TP-Ab (EIA)       TPPA       HBsAg  Non-Reactive  09/20/24 1005    Herpes Simplex Virus PCR       Herpes Simplex VIrus Culture       HIV  Non-Reactive  09/20/24 1005    Hep C RNA Quant PCR       Hep C Antibody  Non-Reactive  09/20/24 1005       Non-Reactive  09/11/24 0915    AFP       NIPT       Cystic Fibrosis (Queta)       Cystic Fibroisis        Spinal Muscular atrophy       Fragile X       Group B Strep       GBS Susceptibility to Clindamycin       GBS Susceptibility to Erythromycin       Fetal Fibronectin       Genetic Testing, Maternal Blood                 Drug Screening       Test Value Date Time    Urine Drug Screen       Amphetamine Screen       Barbiturate Screen  Negative  09/20/24 1005    Benzodiazepine Screen  Negative  09/20/24 1005    Methadone Screen  Negative  09/20/24 1005    Phencyclidine Screen       Opiates Screen  Negative  09/20/24 1005    THC Screen  Negative  09/20/24 1005    Cocaine Screen       Propoxyphene Screen       Buprenorphine Screen       Methamphetamine Screen       Oxycodone Screen  Negative  09/20/24 1005    Tricyclic  Antidepressants Screen                 Legend    ^: Historical                             Past OB History:     OB History    Para Term  AB Living   1 0 0 0 0 0   SAB IAB Ectopic Molar Multiple Live Births   0 0 0 0 0 0      # Outcome Date GA Lbr Stevenson/2nd Weight Sex Type Anes PTL Lv   1 Current                Past Medical History: Past Medical History:   Diagnosis Date    Allergies     Ankle fracture, left 2024    Carpal tunnel syndrome     LEFT    Hyperlipidemia     Prediabetes       Past Surgical History Past Surgical History:   Procedure Laterality Date    ADENOIDECTOMY      CARPAL TUNNEL RELEASE Right 2016    CARPAL TUNNEL RELEASE Left 2023    Procedure: CARPAL TUNNEL RELEASE;  Surgeon: Bill Jones MD;  Location: [facility] OR [facility] [place];  Service: Orthopedics;  Laterality: Left;    TONSILLECTOMY  2012    TYMPANOSTOMY TUBE PLACEMENT      x2      Family History: Family History   Problem Relation Age of Onset    Hypertension Father     Diabetes Father     Hypertension Mother     Diabetes Mother     Hypertension Brother     Diabetes Paternal Grandfather     Diabetes Maternal Grandmother     Deep vein thrombosis Other     Colon cancer Other     Malig Hyperthermia Neg Hx     Miscarriages / Stillbirths Neg Hx     Mental retardation Neg Hx     Mental illness Neg Hx     Learning disabilities Neg Hx     Kidney disease Neg Hx     Hyperlipidemia Neg Hx     Heart disease Neg Hx     Hearing loss Neg Hx     Early death Neg Hx     Drug abuse Neg Hx     Depression Neg Hx     COPD Neg Hx     Cancer Neg Hx     Bleeding Disorder Neg Hx     Birth defects Neg Hx     Asthma Neg Hx     Arthritis Neg Hx     Alcohol abuse Neg Hx     Breast cancer Neg Hx     Ovarian cancer Neg Hx     Uterine cancer Neg Hx     Melanoma Neg Hx     Prostate cancer Neg Hx       Social History:  reports that she has never smoked. She has never been exposed to tobacco smoke. She has never used smokeless tobacco.   reports that she does not  currently use alcohol.   reports no history of drug use.        General ROS: Pertinent items are noted in HPI    Objective       Vital Signs Range for the last 24 hours  Temperature: Temp:  [98.4 °F (36.9 °C)-100.3 °F (37.9 °C)] 98.4 °F (36.9 °C)   Temp Source: Temp src: Oral   BP: BP: (112-131)/(64-72) 112/64   Pulse: Heart Rate:  [] 102   Respirations: Resp:  [18] 18   SPO2: SpO2:  [95 %-99 %] 95 %   O2 Amount (l/min):     O2 Devices Device (Oxygen Therapy): room air   Weight: Weight:  [134 kg (294 lb 12.1 oz)] 134 kg (294 lb 12.1 oz)     Physical Examination: General appearance - alert, well appearing, and in no distress  Mental status - alert, oriented to person, place, and time  Chest - clear to auscultation, no wheezes, rales or rhonchi, symmetric air entry  Heart - normal rate, regular rhythm, normal S1, S2, no murmurs, rubs, clicks or gallops  Abdomen - soft, nontender, nondistended, no masses or organomegaly  Pelvic - normal external genitalia, vulva, vagina, cervix, uterus and adnexa  Back exam - full range of motion, no tenderness, palpable spasm or pain on motion  Neurological - alert, oriented, normal speech, no focal findings or movement disorder noted  Extremities - peripheral pulses normal, no pedal edema, no clubbing or cyanosis  Skin - normal coloration and turgor, no rashes, no suspicious skin lesions noted    Presentation: Unknown   Cervix: Exam by: Method: sterile vaginal exam performed   Dilation: Cervical Dilation (cm): 0   Effacement: Cervical Effacement: 0   Station:         Fetal Heart Rate Assessment   Method: Fetal HR Assessment Method: external   Beats/min: Fetal HR (beats/min): 145   Baseline: Fetal HR Baseline: normal range   Variability: Fetal HR Variability: moderate (amplitude range 6 to 25 bpm)   Accels: Fetal HR Accelerations: episodic, greater than/equal to 10 bpm (32 wks gest or less), lasts at least 10 seconds (32 wks gest or less)   Decels: Fetal HR Decelerations: absent          Uterine Assessment   Method: Method: external tocotransducer, palpation   Frequency (min):     Ctx Count in 10 min:     Duration:     Intensity: Contraction Intensity: no contractions       Crawford Units:       GBS is unknown      Assessment & Plan     Nausea and vomiting      Assessment:  1.  Intrauterine pregnancy at 26w1d gestation with reassuring fetal status.    2.  IUP at 26 weeks estimate gestational age with nausea and vomiting.  Patient received Zofran and reported feeling better.  No evidence for  labor.    Plan:  Discharge home  Increase p.o. fluids   labor precautions      Scott Matthews MD  2024  04:49 EST

## 2024-12-26 NOTE — NURSING NOTE
G1, 26 weeks, presented with complaints of constant lower abd pain since 1800, vomited twice. Denies contractions, leaking, or bleeding. States fetal movement positive. Admitted to triage 2 for evaluation and monitoring. External monitors applied. Abdomen soft, non-tender to palpation. SVE closed/thick/high.  Dr Matthews notified of the above, will come down and evaluate.

## 2024-12-26 NOTE — DISCHARGE INSTR - ACTIVITY
Activity as tolerated   Attempt 1: Called patient, did not answer. Mailbox full, was unable to leave voice message.

## 2024-12-26 NOTE — Clinical Note
[facility] EMERGENCY ROOM  [address] [place] [address]  Phone: [phone]  Fax: [phone]    Jessie Schultz was seen and treated in our emergency department on 12/26/2024.  She may return to work on 12/30/2024.  Seen in ER on 12/26        Thank you for choosing [facility].    Anton Slater, DO

## 2024-12-26 NOTE — DISCHARGE INSTRUCTIONS
Rest at home.  Take the Zofran prescription as needed for nausea and vomiting.  You may take it every 6 hours if necessary.  Clear liquid diet.  Ensure adequate hydration.  Consider electrolyte replacement type drinks such as Gatorade or Pedialyte.  Advance diet very slowly as tolerated.  Follow-up with your primary care provider in 1 week if symptoms or not better.  Follow-up with your OB/GYN as scheduled.  Return to the ER for intractable vomiting, concerns for dehydration, development of severe abdominal pain, rectal bleeding, or any other concerns issues that may arise.

## 2024-12-26 NOTE — NON STRESS TEST
Obstetrical Non-stress Test Interpretation     Name:  Jessie Schultz  MRN: [id number]    29 y.o. female  at 26w1d    Indication: abd pain, vomiting, diarrhea      Fetal Assessment  Fetal Movement: active  Fetal HR Assessment Method: external  Fetal HR (beats/min): 145  Fetal HR Baseline: normal range  Fetal HR Variability: moderate (amplitude range 6 to 25 bpm)  Fetal HR Accelerations: episodic, greater than/equal to 10 bpm (32 wks gest or less), lasts at least 10 seconds (32 wks gest or less)  Fetal HR Decelerations: absent  Sinusoidal Pattern Present: absent    /64   Pulse 102   Temp 98.4 °F (36.9 °C) (Oral)   Resp 18   LMP 2024   SpO2 95%     Reason for test: OB Triage  Date of Test: 2024  Time frame of test: 30 min  RN NST Interpretation:  appropriate for gestational age      Katty Irene RN  2024  01:19 EST

## 2024-12-26 NOTE — ED PROVIDER NOTES
Time: 10:43 AM EST  Date of encounter:  12/26/2024  Independent Historian/Clinical History and Information was obtained by:   Patient  Chief Complaint: Nausea, vomiting, diarrhea    History is limited by: N/A    History of Present Illness:  Patient is a 29 y.o. year old female who presents to the emergency department for evaluation of nausea, vomiting, and diarrhea.  Symptoms began yesterday around 6:30 PM.  She reports has continued and progressed all night.  Patient also reports that her mother and father are sick with the same symptoms.  Additionally, the patient is pregnant at 25 weeks.  She is G1, P0.  Patient also reports that she has developed a rash 30 mL of the night on her face and neck area.  Patient states that she mostly noticed under her eyelids.    HPI    Patient Care Team  Primary Care Provider: Ai Arrington APRN    Past Medical History:     Allergies   Allergen Reactions    Sulfa Antibiotics Hives    Sulfacetamide Sodium Hives    Sulfamethoxazole Hives    Sulfamethoxazole-Trimethoprim Hives    Sulfasalazine Hives    Sulfonylureas Hives     Past Medical History:   Diagnosis Date    Allergies     Ankle fracture, left 12/11/2024    Carpal tunnel syndrome     LEFT    Hyperlipidemia     Prediabetes      Past Surgical History:   Procedure Laterality Date    ADENOIDECTOMY      CARPAL TUNNEL RELEASE Right 2016    CARPAL TUNNEL RELEASE Left 7/19/2023    Procedure: CARPAL TUNNEL RELEASE;  Surgeon: Bill Jones MD;  Location: [facility] [place];  Service: Orthopedics;  Laterality: Left;    TONSILLECTOMY  2012    TYMPANOSTOMY TUBE PLACEMENT      x2     Family History   Problem Relation Age of Onset    Hypertension Father     Diabetes Father     Hypertension Mother     Diabetes Mother     Hypertension Brother     Diabetes Paternal Grandfather     Diabetes Maternal Grandmother     Deep vein thrombosis Other     Colon cancer Other     Malig Hyperthermia Neg Hx     Miscarriages / Stillbirths Neg Hx     Mental  retardation Neg Hx     Mental illness Neg Hx     Learning disabilities Neg Hx     Kidney disease Neg Hx     Hyperlipidemia Neg Hx     Heart disease Neg Hx     Hearing loss Neg Hx     Early death Neg Hx     Drug abuse Neg Hx     Depression Neg Hx     COPD Neg Hx     Cancer Neg Hx     Bleeding Disorder Neg Hx     Birth defects Neg Hx     Asthma Neg Hx     Arthritis Neg Hx     Alcohol abuse Neg Hx     Breast cancer Neg Hx     Ovarian cancer Neg Hx     Uterine cancer Neg Hx     Melanoma Neg Hx     Prostate cancer Neg Hx        Home Medications:  Prior to Admission medications    Medication Sig Start Date End Date Taking? Authorizing Provider   acetaminophen (TYLENOL) 500 MG tablet Take 1 tablet by mouth Every 6 (Six) Hours As Needed for Mild Pain.    Provider, MD Laura   fluticasone (FLONASE) 50 MCG/ACT nasal spray 2 sprays into the nostril(s) as directed by provider Daily for 30 days. 6/21/23 9/11/24  Lesa Britton APRN   lactulose (CHRONULAC) 10 GM/15ML solution Take 30 mL by mouth Daily As Needed (constipation) for up to 5 doses. 10/19/24   Kylah Huang APRN   prenatal vitamin (prenatal, CLASSIC, vitamin) tablet Take  by mouth Daily.    Provider, MD Laura   sodium chloride 0.65 % nasal spray 1 spray into the nostril(s) as directed by provider As Needed for Congestion.  Patient not taking: Reported on 10/4/2024 9/11/24   Ai Arrington APRN        Social History:   Social History     Tobacco Use    Smoking status: Never     Passive exposure: Never    Smokeless tobacco: Never   Vaping Use    Vaping status: Former    Substances: Nicotine, Flavoring    Devices: Disposable   Substance Use Topics    Alcohol use: Not Currently    Drug use: Never         Review of Systems:  Review of Systems   Constitutional:  Negative for chills and fever.   HENT:  Negative for congestion, ear pain and sore throat.    Eyes:  Negative for pain.   Respiratory:  Negative for cough, chest tightness and shortness of breath.  "   Cardiovascular:  Negative for chest pain.   Gastrointestinal:  Positive for diarrhea, nausea and vomiting. Negative for abdominal pain.   Genitourinary:  Negative for flank pain and hematuria.   Musculoskeletal:  Negative for joint swelling.   Skin:  Positive for rash. Negative for pallor.   Neurological:  Negative for seizures and headaches.   All other systems reviewed and are negative.       Physical Exam:  /62   Pulse 115   Temp 97.8 °F (36.6 °C) (Oral)   Resp 16   Ht 170.2 cm (67\")   Wt 131 kg (288 lb 5.8 oz)   LMP 06/26/2024   SpO2 100%   BMI 45.16 kg/m²     Physical Exam    Vital signs were reviewed under triage note.  General appearance - Patient appears well-developed and well-nourished.  Patient is in no acute distress.  Head - Normocephalic, atraumatic.    Pupils - Equal, round, reactive to light.  Extraocular muscles are intact.  Conjunctiva is clear.  Nasal - Normal inspection.  No evidence of trauma or epistaxis.  Tympanic membranes - Gray, intact without erythema or retractions.  Oral mucosa - Pink and moist without lesions or erythema.  Uvula is midline.  Chest wall - Atraumatic.  Chest wall is nontender.  There are no vesicular rashes noted.  Neck - Supple.  Trachea was midline.  There is no palpable lymphadenopathy or thyromegaly.  There are no meningeal signs  Lungs - Clear to auscultation and percussion bilaterally.  Heart - Regular rate and rhythm without any murmurs, clicks, or gallops.  Abdomen - Soft.  Bowel sounds are present.  There is no palpable tenderness.  There is no rebound, guarding, or rigidity.  There is a palpable gravid uterus above the umbilicus.  Fetal heart tones were in the 160s.  There are no pulsatile masses.  Back - Spine is straight and midline.  There is no CVA tenderness.  Extremities - Intact x4 with full range of motion.  There is no palpable edema.  Pulses are intact x4 and equal.  Neurologic - Patient is awake, alert, and oriented x3.  Cranial " nerves II through XII are grossly intact.  Motor and sensory functions grossly intact.  Cerebellar function was normal.  Integument - There are is a petechial rash involving the patient's face and neck area.  There are no petechia or purpura lesions noted.  There are no vesicular lesions noted.           Procedures:  Procedures      Medical Decision Making:      Comorbidities that affect care:    Actively pregnant, hyperlipidemia, prediabetes    External Notes reviewed:    Previous Clinic Note: Clinic note by Dr. Matthews earlier this a.m. in labor and delivery was reviewed by me.      The following orders were placed and all results were independently analyzed by me:  Orders Placed This Encounter   Procedures    Urine Culture - Urine,    [place] Draw    Comprehensive Metabolic Panel    Lipase    Urinalysis With Microscopic If Indicated (No Culture) - Urine, Clean Catch    hCG, Quantitative, Pregnancy    CBC Auto Differential    Urinalysis, Microscopic Only - Urine, Clean Catch    NPO Diet NPO Type: Strict NPO    Undress & Gown    Monitor fetal heart tones    Insert Peripheral IV    CBC & Differential    Green Top (Gel)    Lavender Top    Gold Top - SST    Light Blue Top       Medications Given in the Emergency Department:  Medications   sodium chloride 0.9 % flush 10 mL (has no administration in time range)   sodium chloride 0.9 % bolus 1,000 mL (0 mL Intravenous Stopped 12/26/24 0931)   ondansetron (ZOFRAN) injection 4 mg (4 mg Intravenous Given 12/26/24 0836)        ED Course:     The patient was seen and evaluated in the ED by me.  The above history and physical examination was performed as documented.  Diagnostic data was obtained.  Results reviewed.  Findings were discussed with the patient.  The patient's petechial rash is most likely secondary to her vomiting.  It is only located in her head and neck.  I did discuss this with her and explained that she has any development below her neck that she is to  return to the ER.  Patient has no other acute physical findings.  Patient was given IV fluids and antiemetics.  This was then followed with a p.o. challenge which she has had no further vomiting.  Patient we discharged home with ODT Zofran.  I have also ordered a urine culture on the patient's urinalysis.  This will need to be followed up if it returns back positive she will need to be treated due to her being pregnant.    Labs:    Lab Results (last 24 hours)       Procedure Component Value Units Date/Time    POC Urinalysis Dipstick [[id number]]  (Abnormal) Collected: 12/26/24 0037    Specimen: Urine Updated: 12/26/24 0038     Color Sheila     Clarity, UA Cloudy     Glucose, UA Negative mg/dL      Bilirubin Negative     Ketones, UA Negative     Specific Gravity  1.025     Blood, UA Negative     pH, Urine 7.0     Protein,  mg/dL mg/dL      Urobilinogen, UA 0.2 E.U./dL     Leukocytes Negative     Nitrite, UA Negative    CBC & Differential [[id number]]  (Abnormal) Collected: 12/26/24 0733    Specimen: Blood Updated: 12/26/24 0754    Narrative:      The following orders were created for panel order CBC & Differential.  Procedure                               Abnormality         Status                     ---------                               -----------         ------                     CBC Auto Differential[[id number]]        Abnormal            Final result                 Please view results for these tests on the individual orders.    Comprehensive Metabolic Panel [[id number]]  (Abnormal) Collected: 12/26/24 0733    Specimen: Blood Updated: 12/26/24 0814     Glucose 103 mg/dL      BUN 11 mg/dL      Creatinine 0.40 mg/dL      Sodium 137 mmol/L      Potassium 3.8 mmol/L      Chloride 103 mmol/L      CO2 21.0 mmol/L      Calcium 8.9 mg/dL      Total Protein 6.7 g/dL      Albumin 3.6 g/dL      ALT (SGPT) 10 U/L      AST (SGOT) 11 U/L      Alkaline Phosphatase 60 U/L      Total Bilirubin 0.2 mg/dL      Globulin 3.1  gm/dL      A/G Ratio 1.2 g/dL      BUN/Creatinine Ratio 27.5     Anion Gap 13.0 mmol/L      eGFR 137.6 mL/min/1.73     Narrative:      GFR Categories in Chronic Kidney Disease (CKD)      GFR Category          GFR (mL/min/1.73)    Interpretation  G1                     90 or greater         Normal or high (1)  G2                      60-89                Mild decrease (1)  G3a                   45-59                Mild to moderate decrease  G3b                   30-44                Moderate to severe decrease  G4                    15-29                Severe decrease  G5                    14 or less           Kidney failure          (1)In the absence of evidence of kidney disease, neither GFR category G1 or G2 fulfill the criteria for CKD.    eGFR calculation 2021 CKD-EPI creatinine equation, which does not include race as a factor    Lipase [[id number]]  (Normal) Collected: 12/26/24 0733    Specimen: Blood Updated: 12/26/24 0814     Lipase 31 U/L     hCG, Quantitative, Pregnancy [[id number]] Collected: 12/26/24 0733    Specimen: Blood Updated: 12/26/24 0825     HCG Quantitative 10,076.00 mIU/mL     Narrative:      HCG Ranges by Gestational Age    Females - non-pregnant premenopausal   </= 1mIU/mL HCG  Females - postmenopausal               </= 7mIU/mL HCG    3 Weeks       5.4   -      72 mIU/mL  4 Weeks      10.2   -     708 mIU/mL  5 Weeks       217   -   8,245 mIU/mL  6 Weeks       152   -  32,177 mIU/mL  7 Weeks     4,059   - 153,767 mIU/mL  8 Weeks    31,366   - 149,094 mIU/mL  9 Weeks    59,109   - 135,901 mIU/mL  10 Weeks   44,186   - 170,409 mIU/mL  12 Weeks   27,107   - 201,615 mIU/mL  14 Weeks   24,302   -  93,646 mIU/mL  15 Weeks   12,540   -  69,747 mIU/mL  16 Weeks    8,904   -  55,332 mIU/mL  17 Weeks    8,240   -  51,793 mIU/mL  18 Weeks    9,649   -  55,271 mIU/mL      CBC Auto Differential [[id number]]  (Abnormal) Collected: 12/26/24 0733    Specimen: Blood Updated: 12/26/24 0754     WBC 10.31  10*3/mm3      RBC 4.00 10*6/mm3      Hemoglobin 11.6 g/dL      Hematocrit 34.5 %      MCV 86.3 fL      MCH 29.0 pg      MCHC 33.6 g/dL      RDW 12.2 %      RDW-SD 38.5 fl      MPV 9.0 fL      Platelets 225 10*3/mm3      Neutrophil % 89.2 %      Lymphocyte % 5.8 %      Monocyte % 2.6 %      Eosinophil % 0.4 %      Basophil % 0.5 %      Immature Grans % 1.5 %      Neutrophils, Absolute 9.20 10*3/mm3      Lymphocytes, Absolute 0.60 10*3/mm3      Monocytes, Absolute 0.27 10*3/mm3      Eosinophils, Absolute 0.04 10*3/mm3      Basophils, Absolute 0.05 10*3/mm3      Immature Grans, Absolute 0.15 10*3/mm3      nRBC 0.0 /100 WBC     Urinalysis With Microscopic If Indicated (No Culture) - Urine, Clean Catch [[id number]]  (Abnormal) Collected: 12/26/24 0752    Specimen: Urine, Clean Catch Updated: 12/26/24 0809     Color, UA Yellow     Appearance, UA Clear     pH, UA 8.5     Specific Gravity, UA 1.027     Glucose, UA Negative     Ketones, UA Trace     Bilirubin, UA Negative     Blood, UA Negative     Protein, UA 30 mg/dL (1+)     Leuk Esterase, UA Trace     Nitrite, UA Negative     Urobilinogen, UA 0.2 E.U./dL    Urinalysis, Microscopic Only - Urine, Clean Catch [[id number]]  (Abnormal) Collected: 12/26/24 0752    Specimen: Urine, Clean Catch Updated: 12/26/24 0809     RBC, UA 0-2 /HPF      WBC, UA 3-5 /HPF      Bacteria, UA 2+ /HPF      Squamous Epithelial Cells, UA 3-6 /HPF      Hyaline Casts, UA 0-2 /LPF      Methodology Automated Microscopy    Urine Culture - Urine, Urine, Clean Catch [[id number]] Collected: 12/26/24 0752    Specimen: Urine, Clean Catch Updated: 12/26/24 0919             Imaging:    No Radiology Exams Resulted Within Past 24 Hours      Differential Diagnosis and Discussion:    Diarrhea: Differential diagnosis includes but is not limited to malabsorption syndrome, bacterial infection, carcinoid syndrome, pancreatic hypersecretion, viral infection, celiac sprue, Crohn's disease, ulcerative colitis, ischemic  colitis, colitis, hypermotility, and irritable bowel syndrome.  Rash: Differential diagnosis includes but is not limited to sepsis, cellulitis, [place] Spotted Fever, meningitis, meningococcemia, Varicella, Strep infection, dermatitis, allergic reaction, Lyme disease, and toxic shock syndrome.  Vomiting: Differential diagnosis includes but is not limited to migraine, labyrinthine disorders, psychogenic, metabolic and endocrine causes, peptic ulcer, gastric outlet obstruction, gastritis, gastroenteritis, appendicitis, intestinal obstruction, paralytic ileus, food poisoning, cholecystitis, acute hepatitis, acute pancreatitis, acute febrile illness, and myocardial infarction.    Labs were collected in the emergency department and all labs were reviewed and interpreted by me.    [facility]           Patient Care Considerations:    ANTIBIOTICS: I considered prescribing antibiotics as an outpatient however no bacterial focus of infection was found.      Consultants/Shared Management Plan:    None    Social Determinants of Health:    Patient is independent, reliable, and has access to care.       Disposition and Care Coordination:    Discharged: I considered escalation of care by admitting this patient to the hospital, however there were no acute findings to warrant inpatient admission.  Urine culture is pending.    I have explained the patient´s condition, diagnoses and treatment plan based on the information available to me at this time. I have answered questions and addressed any concerns. The patient has a good  understanding of the patient´s diagnosis, condition, and treatment plan as can be expected at this point. The vital signs have been stable. The patient´s condition is stable and appropriate for discharge from the emergency department.      The patient will pursue further outpatient evaluation with the primary care physician or other designated or consulting physician as outlined in the discharge instructions.  They are agreeable to this plan of care and follow-up instructions have been explained in detail. The patient has received these instructions in written format and has expressed an understanding of the discharge instructions. The patient is aware that any significant change in condition or worsening of symptoms should prompt an immediate return to this or the closest emergency department or call to 911.  I have explained discharge medications and the need for follow up with the patient/caretakers. This was also printed in the discharge instructions. Patient was discharged with the following medications and follow up:      Medication List        New Prescriptions      ondansetron 8 MG tablet  Commonly known as: ZOFRAN  Take 1 tablet by mouth Every 8 (Eight) Hours As Needed for Nausea or Vomiting.               Where to Get Your Medications        These medications were sent to [facility]/pharmacy #[id number] - [place], [place] - [address] - [phone] [facility] [phone]   [address] [place] [address]      Hours: 24-hours Phone: [phone]   ondansetron 8 MG tablet      Ai Arrington APRN  [address] [address] [phone]    In 1 week  If symptoms fail to resolve or improve      Final diagnoses:   Nausea vomiting and diarrhea   Second trimester pregnancy        ED Disposition       ED Disposition   Discharge    Condition   Stable    Comment   --               This medical record created using voice recognition software.             Anton Slater DO  12/26/24 5184

## 2024-12-27 LAB — BACTERIA SPEC AEROBE CULT: NORMAL

## 2025-01-03 ENCOUNTER — OFFICE VISIT (OUTPATIENT)
Dept: FAMILY MEDICINE CLINIC | Facility: CLINIC | Age: 30
End: 2025-01-03
Payer: COMMERCIAL

## 2025-01-03 VITALS
DIASTOLIC BLOOD PRESSURE: 80 MMHG | OXYGEN SATURATION: 99 % | WEIGHT: 293 LBS | SYSTOLIC BLOOD PRESSURE: 129 MMHG | HEART RATE: 91 BPM | HEIGHT: 67 IN | BODY MASS INDEX: 45.99 KG/M2 | TEMPERATURE: 97.7 F

## 2025-01-03 DIAGNOSIS — E66.813 CLASS 3 SEVERE OBESITY WITHOUT SERIOUS COMORBIDITY WITH BODY MASS INDEX (BMI) OF 50.0 TO 59.9 IN ADULT, UNSPECIFIED OBESITY TYPE: ICD-10-CM

## 2025-01-03 DIAGNOSIS — R05.9 COUGH, UNSPECIFIED TYPE: ICD-10-CM

## 2025-01-03 DIAGNOSIS — Z3A.27 27 WEEKS GESTATION OF PREGNANCY: ICD-10-CM

## 2025-01-03 DIAGNOSIS — J02.9 SORE THROAT: ICD-10-CM

## 2025-01-03 DIAGNOSIS — J06.9 UPPER RESPIRATORY TRACT INFECTION, UNSPECIFIED TYPE: Primary | ICD-10-CM

## 2025-01-03 DIAGNOSIS — E66.01 CLASS 3 SEVERE OBESITY WITHOUT SERIOUS COMORBIDITY WITH BODY MASS INDEX (BMI) OF 50.0 TO 59.9 IN ADULT, UNSPECIFIED OBESITY TYPE: ICD-10-CM

## 2025-01-03 DIAGNOSIS — R09.81 NASAL CONGESTION: ICD-10-CM

## 2025-01-03 LAB
EXPIRATION DATE: NORMAL
EXPIRATION DATE: NORMAL
FLUAV AG UPPER RESP QL IA.RAPID: NOT DETECTED
FLUBV AG UPPER RESP QL IA.RAPID: NOT DETECTED
INTERNAL CONTROL: NORMAL
INTERNAL CONTROL: NORMAL
Lab: NORMAL
Lab: NORMAL
S PYO AG THROAT QL: NEGATIVE
SARS-COV-2 AG UPPER RESP QL IA.RAPID: NOT DETECTED

## 2025-01-03 PROCEDURE — 87428 SARSCOV & INF VIR A&B AG IA: CPT

## 2025-01-03 PROCEDURE — 87880 STREP A ASSAY W/OPTIC: CPT

## 2025-01-03 PROCEDURE — 1126F AMNT PAIN NOTED NONE PRSNT: CPT

## 2025-01-03 PROCEDURE — 99214 OFFICE O/P EST MOD 30 MIN: CPT

## 2025-01-03 RX ORDER — AMOXICILLIN 500 MG/1
1000 CAPSULE ORAL 2 TIMES DAILY
Qty: 28 CAPSULE | Refills: 0 | Status: SHIPPED | OUTPATIENT
Start: 2025-01-03 | End: 2025-01-10

## 2025-01-03 NOTE — PROGRESS NOTES
Chief Complaint     Sore Throat, Cough, Nasal Congestion, and Earache    History of Present Illness     Jessie Schultz is a 29 y.o. female who presents to [facility] for evaluation of sore throat, cough, nasal congestion, earache.        She is currently 27 weeks pregnant. She has had the symptoms for about a week now. She is not improving with OTC treatments.      History      Past Medical History:   Diagnosis Date    Allergies     Ankle fracture, left 12/11/2024    Carpal tunnel syndrome     LEFT    Hyperlipidemia     Prediabetes        Past Surgical History:   Procedure Laterality Date    ADENOIDECTOMY      CARPAL TUNNEL RELEASE Right 2016    CARPAL TUNNEL RELEASE Left 7/19/2023    Procedure: CARPAL TUNNEL RELEASE;  Surgeon: Bill Jones MD;  Location: [facility] [place];  Service: Orthopedics;  Laterality: Left;    TONSILLECTOMY  2012    TYMPANOSTOMY TUBE PLACEMENT      x2       Family History   Problem Relation Age of Onset    Hypertension Father     Diabetes Father     Hypertension Mother     Diabetes Mother     Hypertension Brother     Diabetes Paternal Grandfather     Diabetes Maternal Grandmother     Deep vein thrombosis Other     Colon cancer Other     Malig Hyperthermia Neg Hx     Miscarriages / Stillbirths Neg Hx     Mental retardation Neg Hx     Mental illness Neg Hx     Learning disabilities Neg Hx     Kidney disease Neg Hx     Hyperlipidemia Neg Hx     Heart disease Neg Hx     Hearing loss Neg Hx     Early death Neg Hx     Drug abuse Neg Hx     Depression Neg Hx     COPD Neg Hx     Cancer Neg Hx     Bleeding Disorder Neg Hx     Birth defects Neg Hx     Asthma Neg Hx     Arthritis Neg Hx     Alcohol abuse Neg Hx     Breast cancer Neg Hx     Ovarian cancer Neg Hx     Uterine cancer Neg Hx     Melanoma Neg Hx     Prostate cancer Neg Hx         Current Medications        Current Outpatient Medications:     lactulose (CHRONULAC) 10 GM/15ML solution, Take 30 mL by mouth  "Daily As Needed (constipation) for up to 5 doses., Disp: 150 mL, Rfl: 0    ondansetron (ZOFRAN) 8 MG tablet, Take 1 tablet by mouth Every 8 (Eight) Hours As Needed for Nausea or Vomiting., Disp: 20 tablet, Rfl: 0    prenatal vitamin (prenatal, CLASSIC, vitamin) tablet, Take  by mouth Daily., Disp: , Rfl:     sodium chloride 0.65 % nasal spray, 1 spray into the nostril(s) as directed by provider As Needed for Congestion., Disp: 44 mL, Rfl: 12    amoxicillin (AMOXIL) 500 MG capsule, Take 2 capsules by mouth 2 (Two) Times a Day for 7 days., Disp: 28 capsule, Rfl: 0     Allergies     Allergies   Allergen Reactions    Sulfa Antibiotics Hives    Sulfacetamide Sodium Hives    Sulfamethoxazole Hives    Sulfamethoxazole-Trimethoprim Hives    Sulfasalazine Hives    Sulfonylureas Hives       Social History       Social History     Social History Narrative    Not on file       Immunizations     Immunization:  Immunization History   Administered Date(s) Administered    Covid-19 (Pfizer) Gray Cap Monovalent 02/12/2022    Flu Vaccine Intradermal Quad 18-64YR 02/12/2022    Fluzone  >6mos 12/11/2024    HPV Quadrivalent 08/17/2007    Influenza Seasonal Injectable 02/12/2022    Tdap 08/17/2007, 11/29/2014          Objective     Objective     Vital Signs:   /80 (BP Location: Left arm, Patient Position: Sitting, Cuff Size: Adult)   Pulse 91   Temp 97.7 °F (36.5 °C) (Temporal)   Ht 170.2 cm (67\")   Wt 135 kg (297 lb)   SpO2 99%   BMI 46.52 kg/m²       Physical Exam  Constitutional:       Appearance: Normal appearance.   HENT:      Nose: Congestion present.      Mouth/Throat:      Pharynx: Posterior oropharyngeal erythema present.   Cardiovascular:      Rate and Rhythm: Normal rate and regular rhythm.      Pulses: Normal pulses.      Heart sounds: Normal heart sounds.   Pulmonary:      Effort: Pulmonary effort is normal.      Breath sounds: Normal breath sounds.   Skin:     General: Skin is warm and dry.   Neurological:      " General: No focal deficit present.      Mental Status: She is alert and oriented to person, place, and time.   Psychiatric:         Mood and Affect: Mood normal.         Behavior: Behavior normal.         Results    The following data was reviewed by: MARIA E Medina on 01/03/2025:             Assessment and Plan        Assessment and Plan    Diagnoses and all orders for this visit:    1. Upper respiratory tract infection, unspecified type (Primary)  -     amoxicillin (AMOXIL) 500 MG capsule; Take 2 capsules by mouth 2 (Two) Times a Day for 7 days.  Dispense: 28 capsule; Refill: 0    2. Cough, unspecified type  -     POCT SARS-CoV-2 Antigen ALBERTA + Flu    3. Nasal congestion  -     POCT SARS-CoV-2 Antigen ALBERTA + Flu  -     POCT rapid strep A    4. Sore throat  -     POCT SARS-CoV-2 Antigen ALBERTA + Flu  -     POCT rapid strep A    5. Class 3 severe obesity without serious comorbidity with body mass index (BMI) of 50.0 to 59.9 in adult, unspecified obesity type  Assessment & Plan:  Patient's (Body mass index is 46.52 kg/m².) indicates that they are morbidly/severely obese (BMI > 40 or > 35 with obesity - related health condition) with health conditions that include none . Weight is unchanged. BMI  is above average; BMI management plan is completed. We discussed portion control and increasing exercise.       6. 27 weeks gestation of pregnancy            I spent 30 minutes caring for Jessie on this date of service. This time includes time spent by me in the following activities:preparing for the visit, reviewing tests, performing a medically appropriate examination and/or evaluation , counseling and educating the patient/family/caregiver, ordering medications, tests, or procedures, and documenting information in the medical record  Follow Up        Follow Up   Return if symptoms worsen or fail to improve.  Patient was given instructions and counseling regarding her condition or for health maintenance advice. Please  see specific information pulled into the AVS if appropriate.

## 2025-01-03 NOTE — ASSESSMENT & PLAN NOTE
Patient's (Body mass index is 46.52 kg/m².) indicates that they are morbidly/severely obese (BMI > 40 or > 35 with obesity - related health condition) with health conditions that include none . Weight is unchanged. BMI  is above average; BMI management plan is completed. We discussed portion control and increasing exercise.

## 2025-01-08 ENCOUNTER — PATIENT OUTREACH (OUTPATIENT)
Dept: LABOR AND DELIVERY | Facility: HOSPITAL | Age: 30
End: 2025-01-08
Payer: COMMERCIAL

## 2025-01-08 NOTE — OUTREACH NOTE
[facility]  Check-In    Current Estimated Gestational Age: 28w0d      Questions/Answers      Flowsheet Row Responses   Best Method for Contacting Cell   Demographics Reviewed Yes   Currently Employed Yes   Able to keep appointments as scheduled Yes   Baby Active/Feeling Fetal Movemen Yes   How are you presently feeling? good   Resource/Environmental Concerns None   Do you have any questions related to your care experience, your pregnancy, plans for delivery, any concerns, etc? No   Other Education How to find a pediatrician              Ai Trevizo RN  Maternity Nurse Navigator    1/8/2025, 15:53 EST

## 2025-01-09 ENCOUNTER — ROUTINE PRENATAL (OUTPATIENT)
Dept: OBSTETRICS AND GYNECOLOGY | Facility: CLINIC | Age: 30
End: 2025-01-09
Payer: COMMERCIAL

## 2025-01-09 VITALS — SYSTOLIC BLOOD PRESSURE: 115 MMHG | BODY MASS INDEX: 46.67 KG/M2 | DIASTOLIC BLOOD PRESSURE: 78 MMHG | WEIGHT: 293 LBS

## 2025-01-09 DIAGNOSIS — Z34.00 SUPERVISION OF NORMAL FIRST PREGNANCY, ANTEPARTUM: Primary | ICD-10-CM

## 2025-01-09 DIAGNOSIS — R11.0 NAUSEA WITHOUT VOMITING: ICD-10-CM

## 2025-01-09 DIAGNOSIS — O99.210 OBESITY AFFECTING PREGNANCY, ANTEPARTUM, UNSPECIFIED OBESITY TYPE: ICD-10-CM

## 2025-01-09 LAB
GLUCOSE UR STRIP-MCNC: NEGATIVE MG/DL
PROT UR STRIP-MCNC: NEGATIVE MG/DL

## 2025-01-09 RX ORDER — DIPHENHYDRAMINE HYDROCHLORIDE 25 MG/1
25 CAPSULE ORAL
Qty: 60 TABLET | Refills: 1 | Status: CANCELLED | OUTPATIENT
Start: 2025-01-09

## 2025-01-09 RX ORDER — DIPHENHYDRAMINE HYDROCHLORIDE 25 MG/1
25 CAPSULE ORAL DAILY
Qty: 30 TABLET | Refills: 1 | Status: SHIPPED | OUTPATIENT
Start: 2025-01-09

## 2025-01-09 NOTE — PROGRESS NOTES
Routine Prenatal Visit     Subjective  Jessie Schultz is a 29 y.o.  at 28w1d here for her routine OB visit.   She is taking her prenatal vitamins.Reports no loss of fluid or vaginal bleeding. Patient doing well. States she has been having abdominal cramping and insomnia. She states she is unable to have water at her work station.    Pregnancy is complicated by:     Patient Active Problem List   Diagnosis    Arthritis    Back pain    Bilateral carpal tunnel syndrome    Class 3 severe obesity without serious comorbidity with body mass index (BMI) of 50.0 to 59.9 in adult    ETD (eustachian tube dysfunction)    Insulin resistance    Numbness    Supervision of normal first pregnancy, antepartum    Obesity affecting pregnancy, antepartum         OB History    Para Term  AB Living   1             SAB IAB Ectopic Molar Multiple Live Births                    # Outcome Date GA Lbr Stevenson/2nd Weight Sex Type Anes PTL Lv   1 Current                    ROS:    General ROS: negative for - chills or fatigue  Genito-Urinary ROS: negative for  change in urinary stream, vaginal discharge     Objective  Physical Exam:    Vitals:    25 1545   BP: 115/78       Uterine Size: pannus  FHT: 110-160 BPM         Assessment/Plan:   Diagnoses and all orders for this visit:    1. Supervision of normal first pregnancy, antepartum (Primary)  Assessment & Plan:  DONOVAN finalized: 25 per LMP, dating scan ordered but not done  >>dating confirmed with anatomy and ACOG     Optional testing NIPS,CF/SMA,AFP:  NIPS neg XX     COVID: Fully vaccinated  Tdap:  RSV:  Flu: Done 24     Rhogam:  1hr Glucola:  FU TPA w glucola:  ? Desires Sterilization:     Anatomy US:  EFW 92%, AC 84%, transverse, anterior grade 1 placenta, limited normal anatomy, female, suboptimal heart, spine and kidney views  FU US: 2024: the anatomy is normal.  The EFW is at the 96 percentile.  Will consider repeating growth ultrasound in the  third trimester.  STEFANIA is normal.     PROBLEM LIST/PLAN:   Maternal obesity - normal early 1hGTT, ASA 81 mg daily at 12-14 weeks, ANFS    Orders:  -     POC Urinalysis Dipstick    2. Obesity affecting pregnancy, antepartum, unspecified obesity type  -     POC Urinalysis Dipstick    3. Nausea without vomiting  -     vitamin B-6 (PYRIDOXINE) 25 MG tablet; Take 1 tablet by mouth Daily.  Dispense: 30 tablet; Refill: 1  -     doxylamine (UNISOM) 25 MG tablet; Take 1 tablet by mouth At Night As Needed for Nausea.  Dispense: 30 tablet; Refill: 1      Work note given for light duties and to have water at her station due to abdominal cramping      Counseling:   OB precautions, leaking, VB, jyoti killian vs PTL/Labor  FKC  Round Ligament Pain:  The uterus has several ligaments which provide support and keep the uterus in place. As the  uterus grows these ligaments are pulled and stretched which often causes sharp stabbing like pain in the inguinal area.   You may find a pregnancy support band helpful. Changing positions may also help. Yoga is a great way to cope with round ligament and low back pain in pregnancy.    Massage may also help with low back pain   Things to Consider at this Point in your Pregnancy:  Some women experience swelling in their feet during pregnancy. Compression stockings may help  Drink plenty of water and stay active   Make sure you are eating frequent small meals, nuts are a wonderful snack to keep with you            Return in about 4 weeks (around 2/6/2025) for Routine OB visit.      We have gone over prenatal care to include the timing and content of visits. I informed her how to contact the office and/or on call person in the event of any problems and encouraged her to do so when she feels it is necessary.  We then spent time answering her questions which she indicated were answered to her satisfaction.    Roselia Dutton,   1/9/2025 17:04 EST

## 2025-01-09 NOTE — ASSESSMENT & PLAN NOTE
DONOVAN finalized: 4/2/25 per LMP, dating scan ordered but not done  >>dating confirmed with anatomy and ACOG     Optional testing NIPS,CF/SMA,AFP:  NIPS neg XX     COVID: Fully vaccinated  Tdap:  RSV:  Flu: Done 12/11/24     Rhogam:  1hr Glucola:  FU TPA w glucola:  ? Desires Sterilization:     Anatomy US: 11/14 EFW 92%, AC 84%, transverse, anterior grade 1 placenta, limited normal anatomy, female, suboptimal heart, spine and kidney views  FU US: 12/11/2024: the anatomy is normal.  The EFW is at the 96 percentile.  Will consider repeating growth ultrasound in the third trimester.  STEFANIA is normal.     PROBLEM LIST/PLAN:   Maternal obesity - normal early 1hGTT, ASA 81 mg daily at 12-14 weeks, ANFS

## 2025-02-05 NOTE — PROGRESS NOTES
OB FOLLOW UP        Chief Complaint   Patient presents with    Routine Prenatal Visit       Subjective:   Nasal congestion    Objective:  /88   Wt (!) 137 kg (301 lb)   LMP 2024   BMI 47.14 kg/m²   Uterine Size: size greater than dates  FHT: 110-160 BPM    See OB flow for LE edema, cvx exam if performed, and Upro/Uglu    Assessment and Plan:  32w0d  Reassuring pregnancy progress.  Questions answered.  Diagnoses and all orders for this visit:    1. Supervision of normal first pregnancy, antepartum (Primary)  Overview:  DONOVAN finalized: 25 per LMP, dating scan ordered but not done  >>dating confirmed with anatomy and ACOG    Optional testing NIPS,CF/SMA,AFP:  NIPS neg XX    COVID: Fully vaccinated  Tdap:  RSV:  Flu: Done 24    Rhogam:  1hr Glucola:  FU TPA w glucola:  ? Desires Sterilization:    Anatomy US:  EFW 92%, AC 84%, transverse, anterior grade 1 placenta, limited normal anatomy, female, suboptimal heart, spine and kidney views  FU US: 2024: the anatomy is normal.  The EFW is at the 96 percentile.  Will consider repeating growth ultrasound in the third trimester.  STEFANIA is normal.    PROBLEM LIST/PLAN:   Maternal obesity - normal early 1hGTT, ASA 81 mg daily at 12-14 weeks, ANFS        Assessment & Plan:  Doing well, having some congestion  Will try zyrtec  Size larger than dates, will schedule US  Fetal kick counts   labor precautions    Orders:  -     POC Urinalysis Dipstick  -     cetirizine (zyrTEC) 10 MG tablet; Take 1 tablet by mouth Daily.  Dispense: 90 tablet; Refill: 4  -     US Ob 14 + Weeks Single or First Gestation; Future    2. Obesity affecting pregnancy, antepartum, unspecified obesity type  Overview:  Plan early 1hGTT  ASA 81 mg at 12-14 weeks  ANFS    Assessment & Plan:  Size larger than dates on exam today, will schedule ultrasound    Orders:  -     US Ob 14 + Weeks Single or First Gestation; Future      Counseling:    OB precautions, leaking, VB, jyoti  killian vs PTL/Labor  FKC  Continue PNV.  Importance of healthy eating and exercise.    Return in about 2 weeks (around 2/20/2025) for [facility], OB follow up, growth scan.          Sergio Bob, APRN  02/06/2025    [facility] [address]  [facility]  [address] [place] [address]  Dept: [phone]  Dept Fax: [phone]  Loc: [phone]

## 2025-02-06 ENCOUNTER — ROUTINE PRENATAL (OUTPATIENT)
Dept: OBSTETRICS AND GYNECOLOGY | Facility: CLINIC | Age: 30
End: 2025-02-06
Payer: COMMERCIAL

## 2025-02-06 ENCOUNTER — TELEPHONE (OUTPATIENT)
Dept: OBSTETRICS AND GYNECOLOGY | Facility: CLINIC | Age: 30
End: 2025-02-06
Payer: COMMERCIAL

## 2025-02-06 VITALS — SYSTOLIC BLOOD PRESSURE: 131 MMHG | BODY MASS INDEX: 47.14 KG/M2 | DIASTOLIC BLOOD PRESSURE: 88 MMHG | WEIGHT: 293 LBS

## 2025-02-06 DIAGNOSIS — Z34.00 SUPERVISION OF NORMAL FIRST PREGNANCY, ANTEPARTUM: Primary | ICD-10-CM

## 2025-02-06 DIAGNOSIS — O99.210 OBESITY AFFECTING PREGNANCY, ANTEPARTUM, UNSPECIFIED OBESITY TYPE: ICD-10-CM

## 2025-02-06 LAB
GLUCOSE UR STRIP-MCNC: ABNORMAL MG/DL
PROT UR STRIP-MCNC: NEGATIVE MG/DL

## 2025-02-06 RX ORDER — CETIRIZINE HYDROCHLORIDE 10 MG/1
10 TABLET ORAL DAILY
Qty: 90 TABLET | Refills: 4 | Status: SHIPPED | OUTPATIENT
Start: 2025-02-06

## 2025-02-06 NOTE — ASSESSMENT & PLAN NOTE
Doing well, having some congestion  Will try zyrtec  Size larger than dates, will schedule US  Fetal kick counts   labor precautions

## 2025-02-06 NOTE — TELEPHONE ENCOUNTER
[id number]: lm re next appt in system. If date or time does not work please call us back to reschedule

## 2025-02-21 ENCOUNTER — ROUTINE PRENATAL (OUTPATIENT)
Dept: OBSTETRICS AND GYNECOLOGY | Facility: CLINIC | Age: 30
End: 2025-02-21
Payer: COMMERCIAL

## 2025-02-21 ENCOUNTER — TELEPHONE (OUTPATIENT)
Dept: OBSTETRICS AND GYNECOLOGY | Facility: CLINIC | Age: 30
End: 2025-02-21

## 2025-02-21 VITALS — WEIGHT: 293 LBS | DIASTOLIC BLOOD PRESSURE: 78 MMHG | SYSTOLIC BLOOD PRESSURE: 130 MMHG | BODY MASS INDEX: 47.93 KG/M2

## 2025-02-21 DIAGNOSIS — O99.210 OBESITY AFFECTING PREGNANCY, ANTEPARTUM, UNSPECIFIED OBESITY TYPE: ICD-10-CM

## 2025-02-21 DIAGNOSIS — Z34.00 SUPERVISION OF NORMAL FIRST PREGNANCY, ANTEPARTUM: Primary | ICD-10-CM

## 2025-02-21 DIAGNOSIS — O36.63X0 EXCESSIVE FETAL GROWTH AFFECTING MANAGEMENT OF PREGNANCY IN THIRD TRIMESTER, SINGLE OR UNSPECIFIED FETUS: ICD-10-CM

## 2025-02-21 LAB
GLUCOSE UR STRIP-MCNC: NEGATIVE MG/DL
PROT UR STRIP-MCNC: NEGATIVE MG/DL

## 2025-02-21 NOTE — TELEPHONE ENCOUNTER
Patient seen you today, and called in asking if there is anyway she can get a note from you, for her employer that it is okay for patient to sit down while working when need be, because she has to bend over but needs to sit frequently.

## 2025-02-21 NOTE — PROGRESS NOTES
Routine Prenatal Visit     Subjective  Jessie Schultz is a 29 y.o.  at 34w2d here for her routine OB visit.   She is taking her prenatal vitamins.Reports no loss of fluid or vaginal bleeding. Patient doing well without any complaints. Pregnancy complicated by:     Patient Active Problem List   Diagnosis    Arthritis    Back pain    Bilateral carpal tunnel syndrome    Class 3 severe obesity without serious comorbidity with body mass index (BMI) of 50.0 to 59.9 in adult    ETD (eustachian tube dysfunction)    Insulin resistance    Numbness    Supervision of normal first pregnancy, antepartum    Obesity affecting pregnancy, antepartum    Excessive fetal growth affecting management of pregnancy in third trimester         OB History    Para Term  AB Living   1             SAB IAB Ectopic Molar Multiple Live Births                    # Outcome Date GA Lbr Stevenson/2nd Weight Sex Type Anes PTL Lv   1 Current                    ROS:   Review of Systems - General ROS: negative  Psychological ROS: negative  Endocrine ROS: negative  Breast ROS: negative  Respiratory ROS: negative  Cardiovascular ROS: negative  Gastrointestinal ROS: negative  Genito-Urinary ROS: negative  Musculoskeletal ROS: negative  Neurological ROS: negative  Dermatological ROS: negative    Objective  Physical Exam:   Vitals:    25 1235   BP: 130/78       Uterine Size: not examined, US today  FHT: 110-160 BPM    General appearance - alert, well appearing, and in no distress  Mental status - alert, oriented to person, place, and time  Abdomen- Soft, Gravid uterus, non-tender to palpation  Musculoskeletal: negative for - gait disturbance or joint pain  Extremeties: negative swelling or cyanosis   Dermatological: negative rashes or skin lesions       Assessment/Plan:   Diagnoses and all orders for this visit:    1. Supervision of normal first pregnancy, antepartum (Primary)  -     POC Urinalysis Dipstick    2. Obesity affecting  pregnancy, antepartum, unspecified obesity type  -     Fetal Nonstress Test; Standing    3. Excessive fetal growth affecting management of pregnancy in third trimester, single or unspecified fetus  Assessment & Plan:  Growth 89%, EFW 6lb5oz at 34 week appointment  Repeat US ordered     Orders:  -     US Ob 14 + Weeks Single or First Gestation; Future    Other orders  -     Cancel: US Ob 14 + Weeks Single or First Gestation; Future            Counseling:   OB precautions, leaking, VB, jyoti killian vs PTL/Labor  FKC  HTN precautions reviewed: HA, vision change, RUQ/epigastric pain, edema  Round Ligament Pain:  The uterus has several ligaments which provide support and keep the uterus in place. As the  uterus grows these ligaments are pulled and stretched which often causes sharp stabbing like pain in the inguinal area.   You may find a pregnancy support band helpful. Changing positions may also help. Yoga is a great way to cope with round ligament and low back pain in pregnancy.    Massage may also help with low back pain   Things to Consider at this Point in your Pregnancy:  Some women experience swelling in their feet during pregnancy. Compression stockings may help  Drink plenty of water and stay active   Make sure you are eating frequent small meals, nuts are a wonderful snack to keep with you            Return in about 2 weeks (around 3/7/2025) for Routine OB visit.      We have gone over prenatal care to include the timing and content of visits. I informed her how to contact the office and/or on call person in the event of any problems and encouraged her to do so when she feels it is necessary.  We then spent time answering her questions which she indicated were answered to her satisfaction.    Mimi Case DO  2/21/2025 12:46 EST

## 2025-02-22 ENCOUNTER — HOSPITAL ENCOUNTER (OUTPATIENT)
Facility: HOSPITAL | Age: 30
Discharge: HOME | End: 2025-02-22
Attending: OBSTETRICS & GYNECOLOGY | Admitting: OBSTETRICS & GYNECOLOGY
Payer: COMMERCIAL

## 2025-02-22 VITALS — HEART RATE: 94 BPM | SYSTOLIC BLOOD PRESSURE: 124 MMHG | DIASTOLIC BLOOD PRESSURE: 69 MMHG

## 2025-02-22 PROCEDURE — G0463 HOSPITAL OUTPT CLINIC VISIT: HCPCS

## 2025-02-22 PROCEDURE — 59025 FETAL NON-STRESS TEST: CPT

## 2025-02-22 NOTE — NON STRESS TEST
Obstetrical Non-stress Test Interpretation     Name:  Jessie Schultz  MRN: [id number]    29 y.o. female  at 34w3d    Indication: Obesity       Fetal Assessment  Fetal Movement: active  Fetal HR Assessment Method: external  Fetal HR (beats/min): 140  Fetal HR Baseline: normal range  Fetal HR Variability: moderate (amplitude range 6 to 25 bpm)  Fetal HR Accelerations: lasting at least 15 seconds, greater than/equal to 15 bpm  Fetal HR Decelerations: absent  Sinusoidal Pattern Present: absent    /69 (BP Location: Right arm, Patient Position: Sitting)   Pulse 94   LMP 2024     Reason for test: OB Triage (Obesity)  Date of Test: 2025  Time frame of test: 8714-7210  RN NST Interpretation: HUNTER Underwood  2025  13:23 EST   Infliximab Counseling:  I discussed with the patient the risks of infliximab including but not limited to myelosuppression, immunosuppression, autoimmune hepatitis, demyelinating diseases, lymphoma, and serious infections.  The patient understands that monitoring is required including a PPD at baseline and must alert us or the primary physician if symptoms of infection or other concerning signs are noted.

## 2025-03-03 ENCOUNTER — HOSPITAL ENCOUNTER (OUTPATIENT)
Facility: HOSPITAL | Age: 30
Discharge: HOME | End: 2025-03-03
Attending: OBSTETRICS & GYNECOLOGY | Admitting: OBSTETRICS & GYNECOLOGY
Payer: COMMERCIAL

## 2025-03-03 VITALS
OXYGEN SATURATION: 98 % | RESPIRATION RATE: 20 BRPM | DIASTOLIC BLOOD PRESSURE: 82 MMHG | SYSTOLIC BLOOD PRESSURE: 131 MMHG | HEART RATE: 96 BPM | TEMPERATURE: 98 F

## 2025-03-03 LAB
BILIRUB BLD-MCNC: NEGATIVE MG/DL
CLARITY, POC: CLEAR
COLOR UR: YELLOW
GLUCOSE UR STRIP-MCNC: NEGATIVE MG/DL
KETONES UR QL: NEGATIVE
LEUKOCYTE EST, POC: NEGATIVE
NITRITE UR-MCNC: NEGATIVE MG/ML
PH UR: 7 [PH] (ref 5–8)
PROT UR STRIP-MCNC: NEGATIVE MG/DL
RBC # UR STRIP: NEGATIVE /UL
SP GR UR: 1.02 (ref 1–1.03)
UROBILINOGEN UR QL: NORMAL

## 2025-03-03 PROCEDURE — G0463 HOSPITAL OUTPT CLINIC VISIT: HCPCS

## 2025-03-03 PROCEDURE — 81002 URINALYSIS NONAUTO W/O SCOPE: CPT | Performed by: OBSTETRICS & GYNECOLOGY

## 2025-03-03 PROCEDURE — 59025 FETAL NON-STRESS TEST: CPT

## 2025-03-03 NOTE — NURSING NOTE
At patient's bedside, labor precautions and kick counts discussed with patient. All questions answered. Work note gave for today and advised to keep next scheduled office visit. Patient verbalized understanding of all and left unit via ambulatory in stable condition.

## 2025-03-03 NOTE — OBED NOTES
POLI Kapadia  Obstetric History and Physical    No chief complaint on file.      Subjective     PNC provided by:  CARMINA    HPI:    Patient is a 29 y.o. female  currently at 35w5d, who presents with decreased FM and pelvic pressure.  Decreased FM since this am;  pelvic pressure over this past week, getting worse when ambulates.  No LOF, vb;  .    Her prenatal care is complicated by  Obesity and LGA;   Her previous obstetric/gynecological history is noted for is non-contributory.    The following portions of the patients history were reviewed and updated as appropriate:   current medications, allergies, past medical history, past surgical history, past family history, past social history and current problem list.     Prenatal Information:  Prenatal Results       Initial Prenatal Labs       Test Value Reference Range Date Time    Hemoglobin  11.8 g/dL 12.0 - 15.9 10/19/24 1556       12.7 g/dL 12.0 - 15.9 24 1005       12.7 g/dL 12.0 - 15.9 24 0915    Hematocrit  34.7 % 34.0 - 46.6 10/19/24 1556       38.6 % 34.0 - 46.6 24 1005       36.9 % 34.0 - 46.6 24 0915    Platelets  241 10*3/mm3 140 - 450 10/19/24 1556       262 10*3/mm3 140 - 450 24 1005       248 10*3/mm3 140 - 450 24 0915    Rubella IgG  1.79 index Immune >0.99 24 1005    Hepatitis B SAg  Non-Reactive  Non-Reactive 24 1005    Hepatitis C Ab  Non-Reactive  Non-Reactive 24 1005       Non-Reactive  Non-Reactive 24 0915    RPR  Non-Reactive  Non-Reactive 24 1005    T. Pallidum Ab         ABO  A   24 1005    Rh  Positive   24 1005    Antibody Screen  Negative   24 1005    HIV  Non-Reactive  Non-Reactive 24 1005    Urine Culture  50,000 CFU/mL Normal Urogenital Vania   24 0752       >100,000 CFU/mL Mixed Vania Isolated   24 1005    Gonorrhea  Negative  Negative 24 1005    Chlamydia  Negative  Negative 24 1005    TSH  1.160 uIU/mL 0.270 - 4.200 24  0915    HgB A1c   5.00 % 4.80 - 5.60 09/20/24 1005    Varicella IgG        Hemoglobinopathy Fractionation  Comment   09/20/24 1005    Hemoglobinopathy (genetic testing)        Cystic fibrosis         Spinal muscular atrophy        Fragile X                  Fetal testing        Test Value Reference Range Date Time    NIPT        MSAFP        AFP-4                  2nd and 3rd Trimester       Test Value Reference Range Date Time    Hemoglobin (repeated)  11.6 g/dL 12.0 - 15.9 12/26/24 0733       12.3 g/dL 12.0 - 15.9 12/11/24 1003    Hematocrit (repeated)  34.5 % 34.0 - 46.6 12/26/24 0733       35.9 % 34.0 - 46.6 12/11/24 1003    Platelets   225 10*3/mm3 140 - 450 12/26/24 0733       259 10*3/mm3 140 - 450 12/11/24 1003       241 10*3/mm3 140 - 450 10/19/24 1556       262 10*3/mm3 140 - 450 09/20/24 1005       248 10*3/mm3 140 - 450 09/11/24 0915    1 hour GTT   103 mg/dL 65 - 139 12/11/24 1003       106 mg/dL 65 - 139 10/23/24 1004    Antibody Screen (repeated)        3rd TM syphilis scrn (repeated)  RPR         3rd TM syphilis scrn (repeated) TP-Ab        3rd TM syphilis screen TB-Ab (FTA)        Syphilis cascade test TP-Ab (EIA)        Syphilis cascade TPPA        GTT Fasting        GTT 1 Hr        GTT 2 Hr        GTT 3 Hr        Group B Strep                  Other testing        Test Value Reference Range Date Time    Parvo IgG         CMV IgG                   Drug Screening       Test Value Reference Range Date Time    Amphetamine Screen        Barbiturate Screen  Negative  Negative 09/20/24 1005    Benzodiazepine Screen  Negative  Negative 09/20/24 1005    Methadone Screen  Negative  Negative 09/20/24 1005    Phencyclidine Screen        Opiates Screen  Negative  Negative 09/20/24 1005    THC Screen  Negative  Negative 09/20/24 1005    Cocaine Screen  Negative  Negative 09/20/24 1005    Propoxyphene Screen        Buprenorphine Screen        Methamphetamine Screen        Oxycodone Screen  Negative  Negative  09/20/24 1005    Tricyclic Antidepressants Screen                  Legend    ^: Historical                          External Prenatal Results       Pregnancy Outside Results - Transcribed From Office Records - See Scanned Records For Details       Test Value Date Time    ABO  A  09/20/24 1005    Rh  Positive  09/20/24 1005    Antibody Screen  Negative  09/20/24 1005    Varicella IgG       Rubella  1.79 index 09/20/24 1005    Hgb  11.6 g/dL 12/26/24 0733       12.3 g/dL 12/11/24 1003       11.8 g/dL 10/19/24 1556       12.7 g/dL 09/20/24 1005       12.7 g/dL 09/11/24 0915    Hct  34.5 % 12/26/24 0733       35.9 % 12/11/24 1003       34.7 % 10/19/24 1556       38.6 % 09/20/24 1005       36.9 % 09/11/24 0915    HgB A1c   5.00 % 09/20/24 1005    1h GTT  103 mg/dL 12/11/24 1003       106 mg/dL 10/23/24 1004    3h GTT Fasting       3h GTT 1 hour       3h GTT 2 hour       3h GTT 3 hour        Gonorrhea (discrete)  Negative  09/20/24 1005    Chlamydia (discrete)  Negative  09/20/24 1005    RPR  Non-Reactive  09/20/24 1005    Syphils cascade: TP-Ab (FTA)       TP-Ab       TP-Ab (EIA)       TPPA       HBsAg  Non-Reactive  09/20/24 1005    Herpes Simplex Virus PCR       Herpes Simplex VIrus Culture       HIV  Non-Reactive  09/20/24 1005    Hep C RNA Quant PCR       Hep C Antibody  Non-Reactive  09/20/24 1005       Non-Reactive  09/11/24 0915    AFP       NIPT       Cystic Fibrosis (Queta)       Cystic Fibroisis        Spinal Muscular atrophy       Fragile X       Group B Strep       GBS Susceptibility to Clindamycin       GBS Susceptibility to Erythromycin       Fetal Fibronectin       Genetic Testing, Maternal Blood                 Drug Screening       Test Value Date Time    Urine Drug Screen       Amphetamine Screen       Barbiturate Screen  Negative  09/20/24 1005    Benzodiazepine Screen  Negative  09/20/24 1005    Methadone Screen  Negative  09/20/24 1005    Phencyclidine Screen       Opiates Screen  Negative  09/20/24  1005    THC Screen  Negative  24 1005    Cocaine Screen       Propoxyphene Screen       Buprenorphine Screen       Methamphetamine Screen       Oxycodone Screen  Negative  24 1005    Tricyclic Antidepressants Screen                 Legend    ^: Historical                             Problem List:     Patient Active Problem List   Diagnosis    Arthritis    Back pain    Bilateral carpal tunnel syndrome    Class 3 severe obesity without serious comorbidity with body mass index (BMI) of 50.0 to 59.9 in adult    ETD (eustachian tube dysfunction)    Insulin resistance    Numbness    Supervision of normal first pregnancy, antepartum    Obesity affecting pregnancy, antepartum    Excessive fetal growth affecting management of pregnancy in third trimester        Past OB History:     OB History    Para Term  AB Living   1 0 0 0 0 0   SAB IAB Ectopic Molar Multiple Live Births   0 0 0 0 0 0      # Outcome Date GA Lbr Stevenson/2nd Weight Sex Type Anes PTL Lv   1 Current                Past Medical History: Past Medical History:   Diagnosis Date    Allergies     Ankle fracture, left 2024    Carpal tunnel syndrome     LEFT    Hyperlipidemia     Prediabetes       Past Surgical History Past Surgical History:   Procedure Laterality Date    ADENOIDECTOMY      CARPAL TUNNEL RELEASE Right     CARPAL TUNNEL RELEASE Left 2023    Procedure: CARPAL TUNNEL RELEASE;  Surgeon: Bill Jones MD;  Location: [facility] [place];  Service: Orthopedics;  Laterality: Left;    TONSILLECTOMY  2012    TYMPANOSTOMY TUBE PLACEMENT      x2      Family History: Family History   Problem Relation Age of Onset    Hypertension Father     Diabetes Father     Hypertension Mother     Diabetes Mother     Hypertension Brother     Diabetes Paternal Grandfather     Diabetes Maternal Grandmother     Deep vein thrombosis Other     Colon cancer Other     Malig Hyperthermia Neg Hx     Miscarriages / Stillbirths Neg Hx     Mental  retardation Neg Hx     Mental illness Neg Hx     Learning disabilities Neg Hx     Kidney disease Neg Hx     Hyperlipidemia Neg Hx     Heart disease Neg Hx     Hearing loss Neg Hx     Early death Neg Hx     Drug abuse Neg Hx     Depression Neg Hx     COPD Neg Hx     Cancer Neg Hx     Bleeding Disorder Neg Hx     Birth defects Neg Hx     Asthma Neg Hx     Arthritis Neg Hx     Alcohol abuse Neg Hx     Breast cancer Neg Hx     Ovarian cancer Neg Hx     Uterine cancer Neg Hx     Melanoma Neg Hx     Prostate cancer Neg Hx       Social History:  reports that she has never smoked. She has never been exposed to tobacco smoke. She has never used smokeless tobacco.   reports that she does not currently use alcohol.   reports no history of drug use.        General ROS: Pertinent items are noted in HPI        Home Medications:  cetirizine, doxylamine, lactulose, ondansetron, prenatal vitamin, sodium chloride, and vitamin B-6    Allergies:  Allergies   Allergen Reactions    Sulfa Antibiotics Hives    Sulfacetamide Sodium Hives    Sulfamethoxazole Hives    Sulfamethoxazole-Trimethoprim Hives    Sulfasalazine Hives    Sulfonylureas Hives       Objective       Vital Signs Range for the last 24 hours  Temperature: Temp:  [98 °F (36.7 °C)] 98 °F (36.7 °C)   Temp Source: Temp src: Oral   BP: BP: (131)/(82) 131/82   Pulse: Heart Rate:  [] 96   Respirations: Resp:  [20] 20   SPO2: SpO2:  [98 %] 98 %     Physical Examination:   General appearance - alert, well appearing, and in no distress  Mental status - alert, oriented to person, place, and time  Abdomen - soft, nontender, nondistended,   Pelvic - normal external genitalia, vulva, vagina, cervix, and uterus   Back exam - full range of motion, no tenderness or pain on motion  Neurological - alert, oriented, normal speech  Extremities - peripheral pulses normal  Skin - normal coloration and turgor, no suspicious skin lesions noted    Presentation: unknown   Cervix: Method: sterile  "vaginal exam performed   Dilation: Cervical Dilation (cm): 0   Effacement: Cervical Effacement: 0   Station:         Fetal Heart Rate Assessment :  140s, R, GLTV, cat 1  Method:     Beats/min:     Baseline:     Variability:     Accels:     Decels:     Tracing Category:       Uterine Assessment :  occ  Method:     Frequency (min):     Ctx Count in 10 min:     Duration:     Intensity:     [place] Units:       Lab Results (last 24 hours)       Procedure Component Value Units Date/Time    POC Urinalysis Dipstick [[id number]] Collected: 03/03/25 1115    Specimen: Urine Updated: 03/03/25 1117     Color Yellow     Clarity, UA Clear     Glucose, UA Negative mg/dL      Bilirubin Negative     Ketones, UA Negative     Specific Gravity  1.020     Blood, UA Negative     pH, Urine 7.0     Protein, POC Negative mg/dL      Urobilinogen, UA 0.2 E.U./dL     Leukocytes Negative     Nitrite, UA Negative             GBS is unknown     Assessment & Plan     Decreased FM  Pelvic pressure    Assessment:  1.  Intrauterine pregnancy at 35w5d gestation with reactive fetal status.    2.  decreased fetal movement  3.  Obstetrical history significant for is non-contributory.  4.  GBS status: No results found for: \"STREPGPB\"    Plan:  1. Discharge to home.   As no evidence of labor and baby looks great;  precautions given  2.  Plan of care has been reviewed with patient and patient agrees.   3.  Risks, benefits of treatment plan have been discussed.  4.  All questions have been answered.        Electronically signed by Azucena Benton MD, 03/03/25, 11:41 AM EST.    "

## 2025-03-03 NOTE — NON STRESS TEST
Obstetrical Non-stress Test Interpretation     Name:  Jessie Schultz  MRN: [id number]    29 y.o. female  at 35w5d    Indication: ob triage: decreased fetal movement and pelvic pressure      Fetal Movement: active  Fetal HR Assessment Method: external  Fetal HR (beats/min): 140  Fetal HR Baseline: normal range  Fetal HR Variability: moderate (amplitude range 6 to 25 bpm)  Fetal HR Accelerations: greater than/equal to 15 bpm, lasting at least 15 seconds  Fetal HR Decelerations: absent  Sinusoidal Pattern Present: absent    /82 (BP Location: Right arm, Patient Position: Sitting)   Pulse 96   Temp 98 °F (36.7 °C) (Oral)   Resp 20   LMP 2024   SpO2 98%     Reason for test: OB Triage, Decreased fetal movement, Other (Comment) (pelvic pressure)  Date of Test: 3/3/2025  Time frame of test: 5522-1923  RN NST Interpretation: Reactive      Rashida Stevens RN  3/3/2025  12:11 EST

## 2025-03-04 ENCOUNTER — PATIENT OUTREACH (OUTPATIENT)
Dept: LABOR AND DELIVERY | Facility: HOSPITAL | Age: 30
End: 2025-03-04
Payer: COMMERCIAL

## 2025-03-04 NOTE — PROGRESS NOTES
Routine Prenatal Visit     Subjective  Jessie Schultz is a 29 y.o.  at 36w0d here for her routine OB visit.   She is taking her prenatal vitamins.Reports no loss of fluid or vaginal bleeding. Patient doing well.     Pregnancy complicated by:     Patient Active Problem List   Diagnosis    Arthritis    Back pain    Bilateral carpal tunnel syndrome    Class 3 severe obesity without serious comorbidity with body mass index (BMI) of 50.0 to 59.9 in adult    ETD (eustachian tube dysfunction)    Insulin resistance    Numbness    Supervision of normal first pregnancy, antepartum    Obesity affecting pregnancy, antepartum    Excessive fetal growth affecting management of pregnancy in third trimester         OB History    Para Term  AB Living   1             SAB IAB Ectopic Molar Multiple Live Births                    # Outcome Date GA Lbr Stevenson/2nd Weight Sex Type Anes PTL Lv   1 Current                    ROS:  General ROS: negative for - chills or fatigue  Genito-Urinary ROS: negative for  change in urinary stream, vaginal discharge     Objective  Physical Exam:   Vitals:    25 1358   BP: 108/75       Uterine Size: not examined, US today  FHT: 110-160 BPM     GBS RV swab performed today    Assessment/Plan:   Diagnoses and all orders for this visit:    1. Supervision of normal first pregnancy, antepartum (Primary)  Assessment & Plan:  DONOVAN finalized: 25 per LMP, dating scan ordered but not done  >>dating confirmed with anatomy and ACOG    Optional testing NIPS,CF/SMA,AFP:  NIPS neg XX    COVID: Fully vaccinated  Tdap:  RSV:  Flu: Done 24    Rhogam: Apos  1hr Glucola: 103  FU TPA w glucola: neg  ? Desires Sterilization:    Anatomy US:  EFW 92%, AC 84%, transverse, anterior grade 1 placenta, limited normal anatomy, female, suboptimal heart, spine and kidney views  FU US: 2024: the anatomy is normal.  The EFW is at the 96 percentile.  Will consider repeating growth ultrasound in  the third trimester.  STEFANIA is normal.  Fu growth US 25-LGA noted, repeat ordered   FU US: EFW 3198 g 85%, AC 93%, cephalic, anterior grade 2 placenta, STEFANIA 15.9    PROBLEM LIST/PLAN:   Maternal obesity - normal early 1hGTT, ASA 81 mg daily at 12-14 weeks, weekly nsts scheduled  LGA    Orders:  -     POC Urinalysis Dipstick  -     Group B Strep Molecular by PCR - Swab, Vaginal/Rectum; Future  -     Group B Strep Molecular by PCR - Swab, Vaginal/Rectum    2. Obesity affecting pregnancy, antepartum, unspecified obesity type    3. Excessive fetal growth affecting management of pregnancy in third trimester, single or unspecified fetus  Assessment & Plan:  Growth ultrasound today reviewed and EFW is 85%, AC 93%.  3198 g (7 pounds 1 ounce)  We discussed the possibility of macrosomia.  We discussed ACOG recommendations of 4500 g in a nondiabetic mother for a primary  section.  I did give her the option of choosing a  section but she would like to think about it further.  We also discussed if she went into spontaneous labor earlier or an induction of labor at 39 weeks.  Patient is hoping for spontaneous labor.  We will discuss further in coming visits.              Counseling:   OB precautions, leaking, VB, jyoti killian vs PTL/Labor  FKC  Suspected MACROSOMIA discussed.  US is approx +/- 1lb. with subsequent fetal growth until delivery.  Risks of  NLT: Maternal risks vaginal lacerations, pelvic organ prolapse, incontinence of urine or stool, hemorrhage, blood transfusion, .  Infant risks NLT shoulder dystocia, bone fracture, permanent nerve damage limiting lifelong mobility, lack of oxygen including permanent disability, rarely death.  Questions answered.    Round Ligament Pain:  The uterus has several ligaments which provide support and keep the uterus in place. As the  uterus grows these ligaments are pulled and stretched which often causes sharp stabbing like pain in the inguinal area.    You may find a pregnancy support band helpful. Changing positions may also help. Yoga is a great way to cope with round ligament and low back pain in pregnancy.    Massage may also help with low back pain   Things to Consider at this Point in your Pregnancy:  Some women experience swelling in their feet during pregnancy. Compression stockings may help  Drink plenty of water and stay active   Make sure you are eating frequent small meals, nuts are a wonderful snack to keep with you            Return in about 1 week (around 3/12/2025) for Routine OB visit.      We have gone over prenatal care to include the timing and content of visits. I informed her how to contact the office and/or on call person in the event of any problems and encouraged her to do so when she feels it is necessary.  We then spent time answering her questions which she indicated were answered to her satisfaction.    Roselia Dutton,   3/5/2025 15:37 EST

## 2025-03-04 NOTE — OUTREACH NOTE
[facility]  Check-In    Current Estimated Gestational Age: 35w6d      Questions/Answers      Flowsheet Row Responses   Best Method for Contacting Cell   Demographics Reviewed Yes   Currently Employed Yes   Able to keep appointments as scheduled Yes   Baby Active/Feeling Fetal Movemen Yes   How are you presently feeling? Better than yesterday, was seen in l and d for decreased fm and abd pain   Questions regarding prenatal visits or tests to be ordered? No   Supplies ready for baby Breast Pump, Car Seat, Clothing, Crib, Diapers, Feeding Supplies   Resource/Environmental Concerns None   Do you have any questions related to your care experience, your pregnancy, plans for delivery, any concerns, etc? No   Other Education How to find a pediatrician              Ai Trevizo RN  Maternity Nurse Navigator    3/4/2025, 13:55 EST

## 2025-03-05 ENCOUNTER — ROUTINE PRENATAL (OUTPATIENT)
Dept: OBSTETRICS AND GYNECOLOGY | Facility: CLINIC | Age: 30
End: 2025-03-05
Payer: COMMERCIAL

## 2025-03-05 VITALS — DIASTOLIC BLOOD PRESSURE: 75 MMHG | WEIGHT: 293 LBS | SYSTOLIC BLOOD PRESSURE: 108 MMHG | BODY MASS INDEX: 47.93 KG/M2

## 2025-03-05 DIAGNOSIS — O99.210 OBESITY AFFECTING PREGNANCY, ANTEPARTUM, UNSPECIFIED OBESITY TYPE: ICD-10-CM

## 2025-03-05 DIAGNOSIS — O36.63X0 EXCESSIVE FETAL GROWTH AFFECTING MANAGEMENT OF PREGNANCY IN THIRD TRIMESTER, SINGLE OR UNSPECIFIED FETUS: ICD-10-CM

## 2025-03-05 DIAGNOSIS — Z34.00 SUPERVISION OF NORMAL FIRST PREGNANCY, ANTEPARTUM: Primary | ICD-10-CM

## 2025-03-05 LAB
GLUCOSE UR STRIP-MCNC: NEGATIVE MG/DL
PROT UR STRIP-MCNC: NEGATIVE MG/DL

## 2025-03-05 PROCEDURE — 87653 STREP B DNA AMP PROBE: CPT | Performed by: STUDENT IN AN ORGANIZED HEALTH CARE EDUCATION/TRAINING PROGRAM

## 2025-03-05 NOTE — ASSESSMENT & PLAN NOTE
Growth ultrasound today reviewed and EFW is 85%, AC 93%.  3198 g (7 pounds 1 ounce)  We discussed the possibility of macrosomia.  We discussed ACOG recommendations of 4500 g in a nondiabetic mother for a primary  section.  I did give her the option of choosing a  section but she would like to think about it further.  We also discussed if she went into spontaneous labor earlier or an induction of labor at 39 weeks.  Patient is hoping for spontaneous labor.  We will discuss further in coming visits.

## 2025-03-05 NOTE — ASSESSMENT & PLAN NOTE
DONOVAN finalized: 4/2/25 per LMP, dating scan ordered but not done  >>dating confirmed with anatomy and ACOG    Optional testing NIPS,CF/SMA,AFP:  NIPS neg XX    COVID: Fully vaccinated  Tdap:  RSV:  Flu: Done 12/11/24    Rhogam: Apos  1hr Glucola: 103  FU TPA w glucola: neg  ? Desires Sterilization:    Anatomy US: 11/14 EFW 92%, AC 84%, transverse, anterior grade 1 placenta, limited normal anatomy, female, suboptimal heart, spine and kidney views  FU US: 12/11/2024: the anatomy is normal.  The EFW is at the 96 percentile.  Will consider repeating growth ultrasound in the third trimester.  STEFANIA is normal.  Fu growth US 2/21/25-LGA noted, repeat ordered   FU US: EFW 3198 g 85%, AC 93%, cephalic, anterior grade 2 placenta, STEFANIA 15.9    PROBLEM LIST/PLAN:   Maternal obesity - normal early 1hGTT, ASA 81 mg daily at 12-14 weeks, weekly nsts scheduled  LGA

## 2025-03-06 LAB — GP B STREP DNA VAG+RECTUM QL NAA+PROBE: NEGATIVE

## 2025-03-10 ENCOUNTER — HOSPITAL ENCOUNTER (OUTPATIENT)
Dept: LABOR AND DELIVERY | Facility: HOSPITAL | Age: 30
Discharge: HOME | End: 2025-03-10
Payer: COMMERCIAL

## 2025-03-10 ENCOUNTER — HOSPITAL ENCOUNTER (OUTPATIENT)
Facility: HOSPITAL | Age: 30
Discharge: HOME | End: 2025-03-10
Attending: OBSTETRICS & GYNECOLOGY | Admitting: OBSTETRICS & GYNECOLOGY
Payer: COMMERCIAL

## 2025-03-10 VITALS — SYSTOLIC BLOOD PRESSURE: 122 MMHG | DIASTOLIC BLOOD PRESSURE: 84 MMHG | HEART RATE: 99 BPM

## 2025-03-10 PROCEDURE — 59025 FETAL NON-STRESS TEST: CPT

## 2025-03-10 NOTE — NON STRESS TEST
Obstetrical Non-stress Test Interpretation     Name:  Jessie Schultz  MRN: [id number]    29 y.o. female  at 36w5d    Indication: Obesity      Fetal Assessment  Fetal Movement: active  Fetal HR Assessment Method: external  Fetal HR (beats/min): 140  Fetal HR Baseline: normal range  Fetal HR Variability: moderate (amplitude range 6 to 25 bpm)  Fetal HR Accelerations: episodic, greater than/equal to 15 bpm, lasting at least 15 seconds  Fetal HR Decelerations: absent  Sinusoidal Pattern Present: absent    /84 (BP Location: Right arm, Patient Position: Lying)   Pulse 99   LMP 2024     Reason for test: Other (Comment) (Scheduled NST for obesity)  Date of Test: 3/10/2025  Time frame of test:   RN NST Interpretation: Reactive      Magalys York RN  3/10/2025  09:43 EDT

## 2025-03-10 NOTE — PROGRESS NOTES
Routine Prenatal Visit     Subjective  Jessie Schultz is a 29 y.o.  at 36w6d here for her routine OB visit.   She is taking her prenatal vitamins.Reports no loss of fluid or vaginal bleeding. Patient doing well.     Pregnancy complicated by:     Patient Active Problem List   Diagnosis    Arthritis    Back pain    Bilateral carpal tunnel syndrome    Class 3 severe obesity without serious comorbidity with body mass index (BMI) of 50.0 to 59.9 in adult    ETD (eustachian tube dysfunction)    Insulin resistance    Numbness    Supervision of normal first pregnancy, antepartum    Obesity affecting pregnancy, antepartum    Excessive fetal growth affecting management of pregnancy in third trimester         OB History    Para Term  AB Living   1        SAB IAB Ectopic Molar Multiple Live Births              # Outcome Date GA Lbr Stevenson/2nd Weight Sex Type Anes PTL Lv   1 Current                    ROS:  General ROS: negative for - chills or fatigue  Genito-Urinary ROS: negative for  change in urinary stream, vaginal discharge     Objective  Physical Exam:   Vitals:    25 1558   BP: 121/85       Uterine Size: pannus  FHT: 110-160 BPM        Assessment/Plan:   Diagnoses and all orders for this visit:    1. Supervision of normal first pregnancy, antepartum (Primary)  -     POC Urinalysis Dipstick    2. Excessive fetal growth affecting management of pregnancy in third trimester, single or unspecified fetus  -     sodium chloride 0.9 % flush 10 mL  -     sodium chloride 0.9 % flush 10 mL  -     sodium chloride 0.9 % infusion 40 mL  -     lidocaine PF 1% (XYLOCAINE) injection 0.5 mL  -     lactated ringers bolus 1,000 mL  -     lactated ringers infusion  -     Sod Citrate-Citric Acid (BICITRA) oral solution 30 mL  -     famotidine (PEPCID) injection 20 mg  -     acetaminophen (TYLENOL) tablet 1,000 mg  -     ceFAZolin (ANCEF) 2 g in sodium chloride 0.9 % 100 mL IVPB  -     oxytocin (PITOCIN) 30 units in  0.9% sodium chloride 500 mL (premix)  -     oxytocin (PITOCIN) 30 units in 0.9% sodium chloride 500 mL (premix)  -     ketorolac (TORADOL) injection 30 mg  -     acetaminophen (TYLENOL) tablet 650 mg  -     ondansetron ODT (ZOFRAN-ODT) disintegrating tablet 4 mg  -     ondansetron (ZOFRAN) injection 4 mg  -     famotidine (PEPCID) injection 20 mg  -     famotidine (PEPCID) tablet 20 mg    3. Obesity affecting pregnancy, antepartum, unspecified obesity type    Other orders  -     Admit To Obstetrics Inpatient; Standing  -     Code Status and Medical Interventions: CPR (Attempt to Resuscitate); Full; Standing  -     Obtain Informed Consent; Standing  -     Vital Signs q 4 while awake; Standing  -     Vital Signs Per Hospital Policy; Standing  -     Continuous Fetal Monitoring With NST on Admission and Prior to Initiation of Oxytocin.; Standing  -     External Uterine Contraction Monitoring; Standing  -     Notify Provider (Specified); Standing  -     Notify Provider of Tachysystole (Per Hospital Algorithm); Standing  -     Notify Provider if Membranes Ruptured, Bleeding Greater Than 1 Pad Per Hour, Fetal Heart Tone Abnormality or Severe Pain; Standing  -     Bed Rest With Bathroom Privileges; Standing  -     Insert Indwelling Urinary Catheter; Standing  -     Assess Need for Indwelling Urinary Catheter - Follow Removal Protocol; Standing  -     Urinary Catheter Care; Standing  -     Abdominal Prep With Clippers; Standing  -     Chlorhexadine Skin Prep Unless Otherwise Indicated; Standing  -     SCD (Sequential Compression Devices); Standing  -     NPO Diet NPO Type: Strict NPO; Standing  -     Inpatient Anesthesiology Consult; Standing  -     Type & Screen; Standing  -     CBC (No Diff); Standing  -     Treponema pallidum AB w/Reflex RPR; Standing  -     Urine Drug Screen - Urine, Clean Catch; Standing  -     Insert Peripheral IV; Standing  -     Saline Lock & Maintain IV Access; Standing  -     Notify Provider  (Specified); Standing  -     Vital Signs Per Hospital Policy; Standing  -     Strict Bed Rest; Standing  -     Fundal & Lochia Check; Standing  -     Diet: Regular/House; Fluid Consistency: Thin (IDDSI 0); Standing  -     Blood Gas, Arterial, Cord; Standing  -     Blood Gas, Venous, Cord; Standing  -     If indicated -- Please administer RH Immunoglobulin based on results of cord blood evaluation and fetal screen lab tests, pharmacy to dispense; Standing      Patient is wanting to schedule elective primary  due to suspected macrosomia.  Risks and benefits and alternatives of surgery were discussed with patient.  Risks are not limited to anesthesia, bleeding, blood transfusion, infection, damage to surrounding organs, wound separation, re-operation, thromboembolic disease, death.          Counseling:   OB precautions, leaking, VB, jyoti killian vs PTL/Labor  FKC  Primary ELECTIVE  discussed.  The risks, benefits, alternatives, and side effects were reviewed in detal.  In the absence of maternal or fetal indications, vaginal delivery is safe and is recommended.   hospitalizations can be longer and incur all surgical risks- anesthesia, bleeding, infection, damage to pelvic/abd organs.  C-sections confer greater complications in subsequent pregnancies NLT miscarriage/implantation problems, placenta accreta spectrum, uterine rupture (and possible associated maternal/fetal morbidity/mortality), and need for hysterectomy.  This risk increases the more C-sections performed.  In subsequent pregnancies, a vaginal delivery after a csection includes risks of uterine rupture. There are short term benefits of primary Csection compared to vaginal attempt and subsequent CS- decreased hemorrhage and/or transfusions, fewer surgical risks and less urinary incontinence.  An uncomplicated  is the safest for both mother and baby.   Suspected MACROSOMIA discussed.  US is approx +/- 1lb. with subsequent fetal  growth until delivery.  Risks of  NLT: Maternal risks vaginal lacerations, pelvic organ prolapse, incontinence of urine or stool, hemorrhage, blood transfusion, .  Infant risks NLT shoulder dystocia, bone fracture, permanent nerve damage limiting lifelong mobility, lack of oxygen including permanent disability, rarely death.  Questions answered.    Round Ligament Pain:  The uterus has several ligaments which provide support and keep the uterus in place. As the  uterus grows these ligaments are pulled and stretched which often causes sharp stabbing like pain in the inguinal area.   You may find a pregnancy support band helpful. Changing positions may also help. Yoga is a great way to cope with round ligament and low back pain in pregnancy.    Massage may also help with low back pain   Things to Consider at this Point in your Pregnancy:  Some women experience swelling in their feet during pregnancy. Compression stockings may help  Drink plenty of water and stay active   Make sure you are eating frequent small meals, nuts are a wonderful snack to keep with you            Return in about 1 week (around 3/18/2025) for Routine OB visit.      We have gone over prenatal care to include the timing and content of visits. I informed her how to contact the office and/or on call person in the event of any problems and encouraged her to do so when she feels it is necessary.  We then spent time answering her questions which she indicated were answered to her satisfaction.    Roselia Dutton,   3/11/2025 19:01 EDT

## 2025-03-11 ENCOUNTER — ROUTINE PRENATAL (OUTPATIENT)
Dept: OBSTETRICS AND GYNECOLOGY | Facility: CLINIC | Age: 30
End: 2025-03-11
Payer: COMMERCIAL

## 2025-03-11 VITALS — BODY MASS INDEX: 48.55 KG/M2 | SYSTOLIC BLOOD PRESSURE: 121 MMHG | WEIGHT: 293 LBS | DIASTOLIC BLOOD PRESSURE: 85 MMHG

## 2025-03-11 DIAGNOSIS — Z34.00 SUPERVISION OF NORMAL FIRST PREGNANCY, ANTEPARTUM: Primary | ICD-10-CM

## 2025-03-11 DIAGNOSIS — O36.63X0 EXCESSIVE FETAL GROWTH AFFECTING MANAGEMENT OF PREGNANCY IN THIRD TRIMESTER, SINGLE OR UNSPECIFIED FETUS: ICD-10-CM

## 2025-03-11 DIAGNOSIS — O99.210 OBESITY AFFECTING PREGNANCY, ANTEPARTUM, UNSPECIFIED OBESITY TYPE: ICD-10-CM

## 2025-03-11 LAB
GLUCOSE UR STRIP-MCNC: NEGATIVE MG/DL
PROT UR STRIP-MCNC: NEGATIVE MG/DL

## 2025-03-11 RX ORDER — SODIUM CHLORIDE 9 MG/ML
40 INJECTION, SOLUTION INTRAVENOUS AS NEEDED
OUTPATIENT
Start: 2025-03-11

## 2025-03-11 RX ORDER — CITRIC ACID/SODIUM CITRATE 334-500MG
30 SOLUTION, ORAL ORAL ONCE
OUTPATIENT
Start: 2025-03-11 | End: 2025-03-11

## 2025-03-11 RX ORDER — ACETAMINOPHEN 500 MG
1000 TABLET ORAL ONCE
OUTPATIENT
Start: 2025-03-11 | End: 2025-03-11

## 2025-03-11 RX ORDER — FAMOTIDINE 10 MG/ML
20 INJECTION, SOLUTION INTRAVENOUS ONCE AS NEEDED
OUTPATIENT
Start: 2025-03-11

## 2025-03-11 RX ORDER — SODIUM CHLORIDE 0.9 % (FLUSH) 0.9 %
10 SYRINGE (ML) INJECTION EVERY 12 HOURS SCHEDULED
OUTPATIENT
Start: 2025-03-11

## 2025-03-11 RX ORDER — SODIUM CHLORIDE 0.9 % (FLUSH) 0.9 %
10 SYRINGE (ML) INJECTION AS NEEDED
OUTPATIENT
Start: 2025-03-11

## 2025-03-11 RX ORDER — SODIUM CHLORIDE, SODIUM LACTATE, POTASSIUM CHLORIDE, CALCIUM CHLORIDE 600; 310; 30; 20 MG/100ML; MG/100ML; MG/100ML; MG/100ML
150 INJECTION, SOLUTION INTRAVENOUS CONTINUOUS
OUTPATIENT
Start: 2025-03-11 | End: 2025-03-13

## 2025-03-11 RX ORDER — ACETAMINOPHEN 325 MG/1
650 TABLET ORAL ONCE AS NEEDED
OUTPATIENT
Start: 2025-03-11

## 2025-03-11 RX ORDER — LIDOCAINE HYDROCHLORIDE 10 MG/ML
0.5 INJECTION, SOLUTION EPIDURAL; INFILTRATION; INTRACAUDAL; PERINEURAL ONCE AS NEEDED
OUTPATIENT
Start: 2025-03-11

## 2025-03-11 RX ORDER — OXYTOCIN/0.9 % SODIUM CHLORIDE 30/500 ML
999 PLASTIC BAG, INJECTION (ML) INTRAVENOUS ONCE
OUTPATIENT
Start: 2025-03-11 | End: 2025-03-11

## 2025-03-11 RX ORDER — FAMOTIDINE 10 MG
20 TABLET ORAL ONCE AS NEEDED
OUTPATIENT
Start: 2025-03-11

## 2025-03-11 RX ORDER — ONDANSETRON 2 MG/ML
4 INJECTION INTRAMUSCULAR; INTRAVENOUS EVERY 6 HOURS PRN
OUTPATIENT
Start: 2025-03-11

## 2025-03-11 RX ORDER — OXYTOCIN/0.9 % SODIUM CHLORIDE 30/500 ML
250 PLASTIC BAG, INJECTION (ML) INTRAVENOUS CONTINUOUS
OUTPATIENT
Start: 2025-03-11 | End: 2025-03-11

## 2025-03-11 RX ORDER — ONDANSETRON 4 MG/1
4 TABLET, ORALLY DISINTEGRATING ORAL EVERY 6 HOURS PRN
OUTPATIENT
Start: 2025-03-11

## 2025-03-11 RX ORDER — KETOROLAC TROMETHAMINE 15 MG/ML
30 INJECTION, SOLUTION INTRAMUSCULAR; INTRAVENOUS ONCE
OUTPATIENT
Start: 2025-03-11 | End: 2025-03-11

## 2025-03-15 ENCOUNTER — APPOINTMENT (OUTPATIENT)
Dept: ULTRASOUND IMAGING | Facility: HOSPITAL | Age: 30
DRG: 833 | End: 2025-03-15
Payer: COMMERCIAL

## 2025-03-15 ENCOUNTER — HOSPITAL ENCOUNTER (OUTPATIENT)
Dept: LABOR AND DELIVERY | Facility: HOSPITAL | Age: 30
Setting detail: SURGERY ADMIT
Discharge: HOME | End: 2025-03-15
Payer: COMMERCIAL

## 2025-03-15 ENCOUNTER — HOSPITAL ENCOUNTER (OUTPATIENT)
Facility: HOSPITAL | Age: 30
Setting detail: SURGERY ADMIT
Discharge: HOME | DRG: 833 | End: 2025-03-15
Attending: OBSTETRICS & GYNECOLOGY | Admitting: OBSTETRICS & GYNECOLOGY
Payer: COMMERCIAL

## 2025-03-15 VITALS
RESPIRATION RATE: 18 BRPM | TEMPERATURE: 98.1 F | DIASTOLIC BLOOD PRESSURE: 77 MMHG | SYSTOLIC BLOOD PRESSURE: 128 MMHG | HEART RATE: 100 BPM

## 2025-03-15 PROCEDURE — 59025 FETAL NON-STRESS TEST: CPT | Performed by: OBSTETRICS & GYNECOLOGY

## 2025-03-15 PROCEDURE — 76819 FETAL BIOPHYS PROFIL W/O NST: CPT

## 2025-03-15 PROCEDURE — 59025 FETAL NON-STRESS TEST: CPT

## 2025-03-15 NOTE — NON STRESS TEST
Obstetrical Non-stress Test Interpretation     Name:  Jessie Schultz  MRN: [id number]    29 y.o. female  at 37w3d    Indication: obesity affecting pregnancy       Fetal Assessment  Fetal Movement: active  Fetal HR Assessment Method: external  Fetal HR (beats/min): 150  Fetal HR Baseline: normal range  Fetal HR Variability: moderate (amplitude range 6 to 25 bpm)  Fetal HR Accelerations: greater than/equal to 15 bpm, lasting at least 15 seconds  Fetal HR Decelerations: variable  Sinusoidal Pattern Present: absent    /77 (BP Location: Right arm, Patient Position: Sitting)   Pulse 100   Temp 98.1 °F (36.7 °C) (Oral)   Resp 18   LMP 2024     Reason for test:    Date of Test: 3/15/2025  Time frame of test: 0306-3827  RN NST Interpretation: Reactive        Black Ely RN  3/15/2025  10:58 EDT

## 2025-03-17 ENCOUNTER — APPOINTMENT (OUTPATIENT)
Dept: MRI IMAGING | Facility: HOSPITAL | Age: 30
DRG: 833 | End: 2025-03-17
Payer: COMMERCIAL

## 2025-03-17 ENCOUNTER — HOSPITAL ENCOUNTER (INPATIENT)
Facility: HOSPITAL | Age: 30
LOS: 1 days | Discharge: HOME | DRG: 833 | End: 2025-03-18
Attending: OBSTETRICS & GYNECOLOGY | Admitting: OBSTETRICS & GYNECOLOGY
Payer: COMMERCIAL

## 2025-03-17 ENCOUNTER — APPOINTMENT (OUTPATIENT)
Facility: HOSPITAL | Age: 30
DRG: 833 | End: 2025-03-17
Payer: COMMERCIAL

## 2025-03-17 ENCOUNTER — APPOINTMENT (OUTPATIENT)
Dept: ULTRASOUND IMAGING | Facility: HOSPITAL | Age: 30
DRG: 833 | End: 2025-03-17
Payer: COMMERCIAL

## 2025-03-17 PROBLEM — O26.893 ABDOMINAL PAIN IN PREGNANCY, THIRD TRIMESTER: Status: ACTIVE | Noted: 2025-03-17

## 2025-03-17 PROBLEM — R10.9 ABDOMINAL PAIN IN PREGNANCY, THIRD TRIMESTER: Status: ACTIVE | Noted: 2025-03-17

## 2025-03-17 LAB
027 TOXIN: NORMAL
ALBUMIN SERPL-MCNC: 3.4 G/DL (ref 3.5–5.2)
ALBUMIN/GLOB SERPL: 0.9 G/DL
ALP SERPL-CCNC: 117 U/L (ref 39–117)
ALT SERPL W P-5'-P-CCNC: 14 U/L (ref 1–33)
AMYLASE SERPL-CCNC: 48 U/L (ref 28–100)
ANION GAP SERPL CALCULATED.3IONS-SCNC: 11.2 MMOL/L (ref 5–15)
AST SERPL-CCNC: 14 U/L (ref 1–32)
B PARAPERT DNA SPEC QL NAA+PROBE: NOT DETECTED
B PERT DNA SPEC QL NAA+PROBE: NOT DETECTED
BILIRUB BLD-MCNC: NEGATIVE MG/DL
BILIRUB SERPL-MCNC: <0.2 MG/DL (ref 0–1.2)
BUN SERPL-MCNC: 7 MG/DL (ref 6–20)
BUN/CREAT SERPL: 15.6 (ref 7–25)
C DIFF TOX GENS STL QL NAA+PROBE: NEGATIVE
C PNEUM DNA NPH QL NAA+NON-PROBE: NOT DETECTED
CALCIUM SPEC-SCNC: 9.5 MG/DL (ref 8.6–10.5)
CHLORIDE SERPL-SCNC: 106 MMOL/L (ref 98–107)
CLARITY, POC: CLEAR
CO2 SERPL-SCNC: 18.8 MMOL/L (ref 22–29)
COLOR UR: YELLOW
CREAT SERPL-MCNC: 0.45 MG/DL (ref 0.57–1)
CREAT UR-MCNC: 42.7 MG/DL
D DIMER PPP FEU-MCNC: 1.51 MCGFEU/ML (ref 0–0.5)
DEPRECATED RDW RBC AUTO: 42.6 FL (ref 37–54)
EGFRCR SERPLBLD CKD-EPI 2021: 133.7 ML/MIN/1.73
ERYTHROCYTE [DISTWIDTH] IN BLOOD BY AUTOMATED COUNT: 13.8 % (ref 12.3–15.4)
FIBRINOGEN PPP-MCNC: 626 MG/DL (ref 215–521)
FLUAV SUBTYP SPEC NAA+PROBE: NOT DETECTED
FLUBV RNA ISLT QL NAA+PROBE: NOT DETECTED
GEN 5 1HR TROPONIN T REFLEX: <6 NG/L
GLOBULIN UR ELPH-MCNC: 3.7 GM/DL
GLUCOSE SERPL-MCNC: 117 MG/DL (ref 65–99)
GLUCOSE UR STRIP-MCNC: NEGATIVE MG/DL
HADV DNA SPEC NAA+PROBE: NOT DETECTED
HCOV 229E RNA SPEC QL NAA+PROBE: NOT DETECTED
HCOV HKU1 RNA SPEC QL NAA+PROBE: NOT DETECTED
HCOV NL63 RNA SPEC QL NAA+PROBE: NOT DETECTED
HCOV OC43 RNA SPEC QL NAA+PROBE: NOT DETECTED
HCT VFR BLD AUTO: 37.3 % (ref 34–46.6)
HGB BLD-MCNC: 12.3 G/DL (ref 12–15.9)
HMPV RNA NPH QL NAA+NON-PROBE: NOT DETECTED
HPIV1 RNA ISLT QL NAA+PROBE: NOT DETECTED
HPIV2 RNA SPEC QL NAA+PROBE: NOT DETECTED
HPIV3 RNA NPH QL NAA+PROBE: NOT DETECTED
HPIV4 P GENE NPH QL NAA+PROBE: NOT DETECTED
KETONES UR QL: NEGATIVE
LEUKOCYTE EST, POC: NEGATIVE
LIPASE SERPL-CCNC: 29 U/L (ref 13–60)
M PNEUMO IGG SER IA-ACNC: NOT DETECTED
MCH RBC QN AUTO: 28.2 PG (ref 26.6–33)
MCHC RBC AUTO-ENTMCNC: 33 G/DL (ref 31.5–35.7)
MCV RBC AUTO: 85.6 FL (ref 79–97)
NITRITE UR-MCNC: NEGATIVE MG/ML
PH UR: 6 [PH] (ref 5–8)
PLATELET # BLD AUTO: 263 10*3/MM3 (ref 140–450)
PMV BLD AUTO: 9.3 FL (ref 6–12)
POTASSIUM SERPL-SCNC: 4.5 MMOL/L (ref 3.5–5.2)
PROT ?TM UR-MCNC: 12 MG/DL
PROT SERPL-MCNC: 7.1 G/DL (ref 6–8.5)
PROT UR STRIP-MCNC: NEGATIVE MG/DL
PROT/CREAT UR: 0.28 MG/G{CREAT}
RBC # BLD AUTO: 4.36 10*6/MM3 (ref 3.77–5.28)
RBC # UR STRIP: NEGATIVE /UL
RHINOVIRUS RNA SPEC NAA+PROBE: NOT DETECTED
RSV RNA NPH QL NAA+NON-PROBE: NOT DETECTED
SARS-COV-2 RNA RESP QL NAA+PROBE: NOT DETECTED
SODIUM SERPL-SCNC: 136 MMOL/L (ref 136–145)
SP GR UR: 1.02 (ref 1–1.03)
TROPONIN T NUMERIC DELTA: NORMAL
TROPONIN T SERPL HS-MCNC: <6 NG/L
TROPONIN T SERPL HS-MCNC: <6 NG/L
UROBILINOGEN UR QL: NORMAL
WBC NRBC COR # BLD AUTO: 12.71 10*3/MM3 (ref 3.4–10.8)
WHOLE BLOOD HOLD SPECIMEN: NORMAL

## 2025-03-17 PROCEDURE — 76819 FETAL BIOPHYS PROFIL W/O NST: CPT

## 2025-03-17 PROCEDURE — 93005 ELECTROCARDIOGRAM TRACING: CPT | Performed by: OBSTETRICS & GYNECOLOGY

## 2025-03-17 PROCEDURE — 84156 ASSAY OF PROTEIN URINE: CPT | Performed by: OBSTETRICS & GYNECOLOGY

## 2025-03-17 PROCEDURE — 82150 ASSAY OF AMYLASE: CPT | Performed by: OBSTETRICS & GYNECOLOGY

## 2025-03-17 PROCEDURE — 87493 C DIFF AMPLIFIED PROBE: CPT | Performed by: STUDENT IN AN ORGANIZED HEALTH CARE EDUCATION/TRAINING PROGRAM

## 2025-03-17 PROCEDURE — 81002 URINALYSIS NONAUTO W/O SCOPE: CPT | Performed by: OBSTETRICS & GYNECOLOGY

## 2025-03-17 PROCEDURE — 59025 FETAL NON-STRESS TEST: CPT

## 2025-03-17 PROCEDURE — 99221 1ST HOSP IP/OBS SF/LOW 40: CPT | Performed by: STUDENT IN AN ORGANIZED HEALTH CARE EDUCATION/TRAINING PROGRAM

## 2025-03-17 PROCEDURE — 25010000002 THIAMINE PER 100 MG: Performed by: STUDENT IN AN ORGANIZED HEALTH CARE EDUCATION/TRAINING PROGRAM

## 2025-03-17 PROCEDURE — 87040 BLOOD CULTURE FOR BACTERIA: CPT | Performed by: OBSTETRICS & GYNECOLOGY

## 2025-03-17 PROCEDURE — 25810000003 LACTATED RINGERS PER 1000 ML: Performed by: STUDENT IN AN ORGANIZED HEALTH CARE EDUCATION/TRAINING PROGRAM

## 2025-03-17 PROCEDURE — 85027 COMPLETE CBC AUTOMATED: CPT | Performed by: OBSTETRICS & GYNECOLOGY

## 2025-03-17 PROCEDURE — 85379 FIBRIN DEGRADATION QUANT: CPT | Performed by: OBSTETRICS & GYNECOLOGY

## 2025-03-17 PROCEDURE — 84484 ASSAY OF TROPONIN QUANT: CPT | Performed by: OBSTETRICS & GYNECOLOGY

## 2025-03-17 PROCEDURE — 82570 ASSAY OF URINE CREATININE: CPT | Performed by: OBSTETRICS & GYNECOLOGY

## 2025-03-17 PROCEDURE — 84484 ASSAY OF TROPONIN QUANT: CPT | Performed by: STUDENT IN AN ORGANIZED HEALTH CARE EDUCATION/TRAINING PROGRAM

## 2025-03-17 PROCEDURE — 25810000003 LACTATED RINGERS PER 1000 ML: Performed by: OBSTETRICS & GYNECOLOGY

## 2025-03-17 PROCEDURE — G0463 HOSPITAL OUTPT CLINIC VISIT: HCPCS

## 2025-03-17 PROCEDURE — 87506 IADNA-DNA/RNA PROBE TQ 6-11: CPT | Performed by: STUDENT IN AN ORGANIZED HEALTH CARE EDUCATION/TRAINING PROGRAM

## 2025-03-17 PROCEDURE — 74181 MRI ABDOMEN W/O CONTRAST: CPT

## 2025-03-17 PROCEDURE — 93010 ELECTROCARDIOGRAM REPORT: CPT | Performed by: INTERNAL MEDICINE

## 2025-03-17 PROCEDURE — 85384 FIBRINOGEN ACTIVITY: CPT | Performed by: OBSTETRICS & GYNECOLOGY

## 2025-03-17 PROCEDURE — 80053 COMPREHEN METABOLIC PANEL: CPT | Performed by: OBSTETRICS & GYNECOLOGY

## 2025-03-17 PROCEDURE — 0202U NFCT DS 22 TRGT SARS-COV-2: CPT | Performed by: STUDENT IN AN ORGANIZED HEALTH CARE EDUCATION/TRAINING PROGRAM

## 2025-03-17 PROCEDURE — 93970 EXTREMITY STUDY: CPT | Performed by: SURGERY

## 2025-03-17 PROCEDURE — 83690 ASSAY OF LIPASE: CPT | Performed by: OBSTETRICS & GYNECOLOGY

## 2025-03-17 PROCEDURE — 93970 EXTREMITY STUDY: CPT

## 2025-03-17 RX ORDER — ONDANSETRON 4 MG/1
4 TABLET, ORALLY DISINTEGRATING ORAL EVERY 6 HOURS PRN
Status: CANCELLED | OUTPATIENT
Start: 2025-03-17

## 2025-03-17 RX ORDER — AMOXICILLIN 500 MG/1
500 CAPSULE ORAL EVERY 8 HOURS SCHEDULED
Status: DISCONTINUED | OUTPATIENT
Start: 2025-03-17 | End: 2025-03-18

## 2025-03-17 RX ORDER — HYDROMORPHONE HYDROCHLORIDE 2 MG/ML
0.5 INJECTION, SOLUTION INTRAMUSCULAR; INTRAVENOUS; SUBCUTANEOUS
Refills: 0 | Status: CANCELLED | OUTPATIENT
Start: 2025-03-17 | End: 2025-03-24

## 2025-03-17 RX ORDER — SODIUM CHLORIDE 0.9 % (FLUSH) 0.9 %
10 SYRINGE (ML) INJECTION EVERY 12 HOURS SCHEDULED
Status: DISCONTINUED | OUTPATIENT
Start: 2025-03-17 | End: 2025-03-18 | Stop reason: HOSPADM

## 2025-03-17 RX ORDER — SODIUM CHLORIDE, SODIUM LACTATE, POTASSIUM CHLORIDE, CALCIUM CHLORIDE 600; 310; 30; 20 MG/100ML; MG/100ML; MG/100ML; MG/100ML
125 INJECTION, SOLUTION INTRAVENOUS ONCE
Status: COMPLETED | OUTPATIENT
Start: 2025-03-17 | End: 2025-03-17

## 2025-03-17 RX ORDER — SODIUM CHLORIDE 0.9 % (FLUSH) 0.9 %
10 SYRINGE (ML) INJECTION EVERY 12 HOURS SCHEDULED
Status: CANCELLED | OUTPATIENT
Start: 2025-03-17

## 2025-03-17 RX ORDER — SODIUM CHLORIDE 0.9 % (FLUSH) 0.9 %
10 SYRINGE (ML) INJECTION AS NEEDED
Status: DISCONTINUED | OUTPATIENT
Start: 2025-03-17 | End: 2025-03-18 | Stop reason: HOSPADM

## 2025-03-17 RX ORDER — SODIUM CHLORIDE, SODIUM LACTATE, POTASSIUM CHLORIDE, CALCIUM CHLORIDE 600; 310; 30; 20 MG/100ML; MG/100ML; MG/100ML; MG/100ML
75 INJECTION, SOLUTION INTRAVENOUS CONTINUOUS
Status: DISCONTINUED | OUTPATIENT
Start: 2025-03-17 | End: 2025-03-18

## 2025-03-17 RX ORDER — ONDANSETRON 2 MG/ML
4 INJECTION INTRAMUSCULAR; INTRAVENOUS EVERY 6 HOURS PRN
Status: CANCELLED | OUTPATIENT
Start: 2025-03-17

## 2025-03-17 RX ORDER — SODIUM CHLORIDE 0.9 % (FLUSH) 0.9 %
10 SYRINGE (ML) INJECTION AS NEEDED
Status: CANCELLED | OUTPATIENT
Start: 2025-03-17

## 2025-03-17 RX ORDER — SODIUM CHLORIDE 9 MG/ML
40 INJECTION, SOLUTION INTRAVENOUS AS NEEDED
Status: CANCELLED | OUTPATIENT
Start: 2025-03-17

## 2025-03-17 RX ORDER — THIAMINE HYDROCHLORIDE 100 MG/ML
100 INJECTION, SOLUTION INTRAMUSCULAR; INTRAVENOUS ONCE
Status: COMPLETED | OUTPATIENT
Start: 2025-03-17 | End: 2025-03-17

## 2025-03-17 RX ORDER — LIDOCAINE HYDROCHLORIDE 10 MG/ML
0.5 INJECTION, SOLUTION EPIDURAL; INFILTRATION; INTRACAUDAL; PERINEURAL ONCE AS NEEDED
Status: CANCELLED | OUTPATIENT
Start: 2025-03-17

## 2025-03-17 RX ORDER — ACETAMINOPHEN 325 MG/1
650 TABLET ORAL EVERY 4 HOURS PRN
Status: CANCELLED | OUTPATIENT
Start: 2025-03-17

## 2025-03-17 RX ADMIN — Medication 10 ML: at 09:00

## 2025-03-17 RX ADMIN — AMOXICILLIN 500 MG: 500 CAPSULE ORAL at 12:30

## 2025-03-17 RX ADMIN — SODIUM CHLORIDE, SODIUM LACTATE, POTASSIUM CHLORIDE, CALCIUM CHLORIDE 75 ML/HR: 20; 30; 600; 310 INJECTION, SOLUTION INTRAVENOUS at 18:57

## 2025-03-17 RX ADMIN — DOXYLAMINE SUCCINATE 25 MG: 25 TABLET ORAL at 21:53

## 2025-03-17 RX ADMIN — THIAMINE HYDROCHLORIDE 100 MG: 100 INJECTION, SOLUTION INTRAMUSCULAR; INTRAVENOUS at 12:30

## 2025-03-17 RX ADMIN — SODIUM CHLORIDE, SODIUM LACTATE, POTASSIUM CHLORIDE, CALCIUM CHLORIDE 125 ML/HR: 20; 30; 600; 310 INJECTION, SOLUTION INTRAVENOUS at 09:56

## 2025-03-17 RX ADMIN — AMOXICILLIN 500 MG: 500 CAPSULE ORAL at 22:55

## 2025-03-17 NOTE — PLAN OF CARE
Goal Outcome Evaluation:   Patient tolerating PO food and liquids. Vital signs stable. Pain decreasing per patient report. Awaiting blood culture results.

## 2025-03-17 NOTE — NURSING NOTE
37w5d presents with reports of generalized abdominal pain, nausea, vomiting and multiple rounds of diarrhea since around 8pm last night and left sided chest pain. Denies vaginal bleeding or leaking of fluid. Two patient id verified, efm and uc toco applied abdomen palpates soft and non tender, fetal movement noted.

## 2025-03-17 NOTE — CONSULTS
[facility]   Hospitalist Consult Note  Date: 3/17/2025   Patient Name: Jessie Schultz  : 1995  MRN: [id number]  Primary Care Physician:  Ai Arrington APRN  Referring Physician: Azucena Benton MD  Date of admission: 3/17/2025    Subjective   Subjective     Reason for Consult/ Chief Complaint: nausea, vomiting, abdominal pain    HPI:  Jessie Schultz is a 29 y.o. female  37w5d who presented abdominal pain, nausea, vomiting and diarrhea since last night.  Abdominal pain was intermittent which gradually became constant, patient took Tylenol with minimal improvement.  Patient then presented for further evaluation management.  OB/GYN on board, and admitted for further evaluation and management.  Medicine was consulted for medical management of her presenting symptoms.  Lab work showed leukocytosis of 12.71.  UA negative.  Lipase and Mytelase normal.  First troponin negative.  EKG showed no acute ST or T wave changes.  Blood culture drawn and MRI abdomen pelvis without contrast was obtained which showed no acute abnormality abdomen with intrauterine pregnancy with cephalic fetal lie.  During my evaluation, patient complaining of diffuse lower abdominal pain with persistent diarrhea, nausea and vomiting.  Complaining of some chills but denied any fever or rigors.  Denies any urinary symptoms.  Denied any deli use, store salad, unpasteurized dairy products or exposure to listeria. No sick contacts.     Review of Systems   All systems were reviewed and negative except for: Nausea, vomiting, abdominal pain    Personal History     Past Medical History:  Past Medical History:   Diagnosis Date    Allergies     Ankle fracture, left 2024    Carpal tunnel syndrome     LEFT    Hyperlipidemia     Prediabetes          Past Surgical History:  Past Surgical History:   Procedure Laterality Date    ADENOIDECTOMY      CARPAL TUNNEL RELEASE Right 2016    CARPAL TUNNEL RELEASE Left 2023    Procedure:  CARPAL TUNNEL RELEASE;  Surgeon: Bill Jones MD;  Location: [facility] OR [facility];  Service: Orthopedics;  Laterality: Left;    TONSILLECTOMY  2012    TYMPANOSTOMY TUBE PLACEMENT      x2        Family History:   Family History   Problem Relation Age of Onset    Hypertension Father     Diabetes Father     Hypertension Mother     Diabetes Mother     Hypertension Brother     Diabetes Paternal Grandfather     Diabetes Maternal Grandmother     Deep vein thrombosis Other     Colon cancer Other     Malig Hyperthermia Neg Hx     Miscarriages / Stillbirths Neg Hx     Mental retardation Neg Hx     Mental illness Neg Hx     Learning disabilities Neg Hx     Kidney disease Neg Hx     Hyperlipidemia Neg Hx     Heart disease Neg Hx     Hearing loss Neg Hx     Early death Neg Hx     Drug abuse Neg Hx     Depression Neg Hx     COPD Neg Hx     Cancer Neg Hx     Bleeding Disorder Neg Hx     Birth defects Neg Hx     Asthma Neg Hx     Arthritis Neg Hx     Alcohol abuse Neg Hx     Breast cancer Neg Hx     Ovarian cancer Neg Hx     Uterine cancer Neg Hx     Melanoma Neg Hx     Prostate cancer Neg Hx        Social History:   Social History     Socioeconomic History    Marital status: Single   Tobacco Use    Smoking status: Never     Passive exposure: Never    Smokeless tobacco: Never   Vaping Use    Vaping status: Former    Substances: Nicotine, Flavoring    Devices: Disposable   Substance and Sexual Activity    Alcohol use: Not Currently    Drug use: Never    Sexual activity: Yes     Partners: Male     Birth control/protection: None       Home Medications:  cetirizine, doxylamine, lactulose, ondansetron, prenatal vitamin, sodium chloride, and vitamin B-6    Allergies:  Allergies   Allergen Reactions    Sulfa Antibiotics Hives    Sulfacetamide Sodium Hives    Sulfamethoxazole Hives    Sulfamethoxazole-Trimethoprim Hives    Sulfasalazine Hives    Sulfonylureas Hives       Review of Systems   All systems were reviewed and negative except for:  Nausea, vomiting, abdominal pain    Objective    Objective     Vitals:   Temp:  [97.9 °F (36.6 °C)] 97.9 °F (36.6 °C)  Heart Rate:  [] 92  Resp:  [24] 24  BP: (126-138)/() 126/86    Physical Exam:   Constitutional: Awake, alert, no acute distress   Respiratory: Clear to auscultation bilaterally, nonlabored respirations    Cardiovascular: RRR   Gastrointestinal: distended; mild tenderness on lower abdominal region   Neurologic: alert, awake, Oriented x 3, speech clear    Result Review    Result Review:  I have personally reviewed the results from the time of this admission to 3/17/2025 08:33 EDT and agree with these findings:  []  Laboratory  []  Microbiology  []  Radiology  []  EKG/Telemetry   []  Cardiology/Vascular   []  Pathology  []  Old records  []  Other:    Assessment & Plan   Assessment / Plan     Assessment/Plan:  Nausea, vomiting  Diarrhea   ?Chest pain, resolved  ; 35w5d    -Patient is being managed in Ob/Gyn unit.  -medicine consulted for her ongoing symptoms.  -Admitted for nausea, vomiting ad abdominal pain since last night.  -Mild risk for listeria given her presenting symptoms and her being on third trimester.  -Starting her on PO amoxicillin 500 mg every 8 hours until blood culture is back for empiric coverage for Listeria.  -Obtaining Enteric parasitic and bacterial panel. Also obtaining C diff PCR.  -Symptomatic management for now.  -LR to maintain hydration.  -Also receiving IV thiamine.  -Awaiting blood culture result.  -D dimer 1.51 (third trimester normal range <2.0) and fibrinogen 626 (normal range in third trimester 350-650). Within normal range; less likely PE. Will hold CTA PE and will obtain lower extremity duplex to r/o DVT instead.  -EKG normal and normal first and second troponin. Chest pain could be radiating pain from her abdominal pain; no chest pain during my evaluation.  -Labs in AM. Leukocytosis of 12.71; will trend and monitor.  -Medicine will continue to follow  up patient. Thank you for involving us in care of the patient.          VTE Prophylaxis:  No VTE prophylaxis order currently exists.        Electronically signed by Silvia Hammond MD, 03/17/25, 8:33 AM EDT.

## 2025-03-17 NOTE — H&P
Abdominal pain, nausea, vomiting, and diarrhea [facility]ia  Obstetric History and Physical    Chief Complaint   Patient presents with    Abdominal Pain    Vomiting     N/V/D       Subjective     HPI:    Patient is a 29 y.o. female  currently at 37w5d, who presents with abdominal pain, nausea, vomiting, and diarrhea    Patient reports the nausea vomiting and diarrhea began last evening at approximately 8 PM.  Patient denies sick contacts and notes that she made herself chicken and rice today. Patient does not believe she has food poisoning.    Patient also notes the onset of chest pain at approximately 11 PM last night.  Patient states the pain has been sharp and on the left side of her chest.  Patient denies jaw pain and denies radiation of the pain along her left arm.    Patient reports she came in this morning because everything became acutely worse and she did not feel well.    Patient reports good fetal movement, denies vaginal bleeding, denies leakage of fluid, and reports feeling a few contractions.    Patient has felt some chills but denies fevers.  Patient denies feeling short of breath.    PNC provided by:  [facility]. Her prenatal care is complicated by:   Patient Active Problem List   Diagnosis    Arthritis    Back pain    Bilateral carpal tunnel syndrome    Class 3 severe obesity without serious comorbidity with body mass index (BMI) of 50.0 to 59.9 in adult    ETD (eustachian tube dysfunction)    Insulin resistance    Numbness    Supervision of normal first pregnancy, antepartum    Obesity affecting pregnancy, antepartum    Excessive fetal growth affecting management of pregnancy in third trimester    Abdominal pain in pregnancy, third trimester         The following portions of the patients history were reviewed and updated as appropriate:   current medications, allergies, past medical history, past surgical history, past family history, past social history and current problem list.     Prenatal  Information:  Prenatal Results       Initial Prenatal Labs       Test Value Reference Range Date Time    Hemoglobin  11.8 g/dL 12.0 - 15.9 10/19/24 1556       12.7 g/dL 12.0 - 15.9 09/20/24 1005       12.7 g/dL 12.0 - 15.9 09/11/24 0915    Hematocrit  34.7 % 34.0 - 46.6 10/19/24 1556       38.6 % 34.0 - 46.6 09/20/24 1005       36.9 % 34.0 - 46.6 09/11/24 0915    Platelets  241 10*3/mm3 140 - 450 10/19/24 1556       262 10*3/mm3 140 - 450 09/20/24 1005       248 10*3/mm3 140 - 450 09/11/24 0915    Rubella IgG  1.79 index Immune >0.99 09/20/24 1005    Hepatitis B SAg  Non-Reactive  Non-Reactive 09/20/24 1005    Hepatitis C Ab  Non-Reactive  Non-Reactive 09/20/24 1005       Non-Reactive  Non-Reactive 09/11/24 0915    RPR  Non-Reactive  Non-Reactive 09/20/24 1005    T. Pallidum Ab         ABO  A   09/20/24 1005    Rh  Positive   09/20/24 1005    Antibody Screen  Negative   09/20/24 1005    HIV  Non-Reactive  Non-Reactive 09/20/24 1005    Urine Culture  50,000 CFU/mL Normal Urogenital Vania   12/26/24 0752       >100,000 CFU/mL Mixed Vania Isolated   09/20/24 1005    Gonorrhea  Negative  Negative 09/20/24 1005    Chlamydia  Negative  Negative 09/20/24 1005    TSH  1.160 uIU/mL 0.270 - 4.200 09/11/24 0915    HgB A1c   5.00 % 4.80 - 5.60 09/20/24 1005    Varicella IgG        Hemoglobinopathy Fractionation  Comment   09/20/24 1005    Hemoglobinopathy (genetic testing)        Cystic fibrosis         Spinal muscular atrophy        Fragile X                  Fetal testing        Test Value Reference Range Date Time    NIPT        MSAFP        AFP-4                  2nd and 3rd Trimester       Test Value Reference Range Date Time    Hemoglobin (repeated)  12.3 g/dL 12.0 - 15.9 03/17/25 0720       11.6 g/dL 12.0 - 15.9 12/26/24 0733       12.3 g/dL 12.0 - 15.9 12/11/24 1003    Hematocrit (repeated)  37.3 % 34.0 - 46.6 03/17/25 0720       34.5 % 34.0 - 46.6 12/26/24 0733       35.9 % 34.0 - 46.6 12/11/24 1003    Platelets   263  10*3/mm3 140 - 450 03/17/25 0720       225 10*3/mm3 140 - 450 12/26/24 0733       259 10*3/mm3 140 - 450 12/11/24 1003       241 10*3/mm3 140 - 450 10/19/24 1556       262 10*3/mm3 140 - 450 09/20/24 1005       248 10*3/mm3 140 - 450 09/11/24 0915    1 hour GTT   103 mg/dL 65 - 139 12/11/24 1003       106 mg/dL 65 - 139 10/23/24 1004    Antibody Screen (repeated)        3rd TM syphilis scrn (repeated)  RPR         3rd TM syphilis scrn (repeated) TP-Ab        3rd TM syphilis screen TB-Ab (FTA)        Syphilis cascade test TP-Ab (EIA)        Syphilis cascade TPPA        GTT Fasting        GTT 1 Hr        GTT 2 Hr        GTT 3 Hr        Group B Strep  Negative  Negative 03/05/25 1430              Other testing        Test Value Reference Range Date Time    Parvo IgG         CMV IgG                   Drug Screening       Test Value Reference Range Date Time    Amphetamine Screen        Barbiturate Screen  Negative  Negative 09/20/24 1005    Benzodiazepine Screen  Negative  Negative 09/20/24 1005    Methadone Screen  Negative  Negative 09/20/24 1005    Phencyclidine Screen        Opiates Screen  Negative  Negative 09/20/24 1005    THC Screen  Negative  Negative 09/20/24 1005    Cocaine Screen  Negative  Negative 09/20/24 1005    Propoxyphene Screen        Buprenorphine Screen        Methamphetamine Screen        Oxycodone Screen  Negative  Negative 09/20/24 1005    Tricyclic Antidepressants Screen                  Legend    ^: Historical                          External Prenatal Results       Pregnancy Outside Results - Transcribed From Office Records - See Scanned Records For Details       Test Value Date Time    ABO  A  09/20/24 1005    Rh  Positive  09/20/24 1005    Antibody Screen  Negative  09/20/24 1005    Varicella IgG       Rubella  1.79 index 09/20/24 1005    Hgb  12.3 g/dL 03/17/25 0720       11.6 g/dL 12/26/24 0733       12.3 g/dL 12/11/24 1003       11.8 g/dL 10/19/24 1556       12.7 g/dL 09/20/24 1005        12.7 g/dL 24 0915    Hct  37.3 % 25 0720       34.5 % 24 0733       35.9 % 24 1003       34.7 % 10/19/24 1556       38.6 % 24 1005       36.9 % 24 0915    HgB A1c   5.00 % 24 1005    1h GTT  103 mg/dL 24 1003       106 mg/dL 10/23/24 1004    3h GTT Fasting       3h GTT 1 hour       3h GTT 2 hour       3h GTT 3 hour        Gonorrhea (discrete)  Negative  24 1005    Chlamydia (discrete)  Negative  24 1005    RPR  Non-Reactive  24 1005    Syphils cascade: TP-Ab (FTA)       TP-Ab       TP-Ab (EIA)       TPPA       HBsAg  Non-Reactive  24 1005    Herpes Simplex Virus PCR       Herpes Simplex VIrus Culture       HIV  Non-Reactive  24 1005    Hep C RNA Quant PCR       Hep C Antibody  Non-Reactive  24 1005       Non-Reactive  24 0915    AFP       NIPT       Cystic Fibrosis (Queta)       Cystic Fibroisis        Spinal Muscular atrophy       Fragile X       Group B Strep  Negative  25 1430    GBS Susceptibility to Clindamycin       GBS Susceptibility to Erythromycin       Fetal Fibronectin       Genetic Testing, Maternal Blood                 Drug Screening       Test Value Date Time    Urine Drug Screen       Amphetamine Screen       Barbiturate Screen  Negative  24 1005    Benzodiazepine Screen  Negative  24 1005    Methadone Screen  Negative  24 1005    Phencyclidine Screen       Opiates Screen  Negative  24 1005    THC Screen  Negative  24 1005    Cocaine Screen       Propoxyphene Screen       Buprenorphine Screen       Methamphetamine Screen       Oxycodone Screen  Negative  24 1005    Tricyclic Antidepressants Screen                 Legend    ^: Historical                             Past OB History:     OB History    Para Term  AB Living   1 0 0 0 0 0   SAB IAB Ectopic Molar Multiple Live Births   0 0 0 0 0 0      # Outcome Date GA Lbr Stevenson/2nd Weight Sex Type Anes PTL Lv   1  Current                Past Medical History: Past Medical History:   Diagnosis Date    Allergies     Ankle fracture, left 12/11/2024    Carpal tunnel syndrome     LEFT    Hyperlipidemia     Prediabetes       Past Surgical History Past Surgical History:   Procedure Laterality Date    ADENOIDECTOMY      CARPAL TUNNEL RELEASE Right 2016    CARPAL TUNNEL RELEASE Left 7/19/2023    Procedure: CARPAL TUNNEL RELEASE;  Surgeon: Bill Jones MD;  Location: [facility] OR [facility];  Service: Orthopedics;  Laterality: Left;    TONSILLECTOMY  2012    TYMPANOSTOMY TUBE PLACEMENT      x2      Family History: Family History   Problem Relation Age of Onset    Hypertension Father     Diabetes Father     Hypertension Mother     Diabetes Mother     Hypertension Brother     Diabetes Paternal Grandfather     Diabetes Maternal Grandmother     Deep vein thrombosis Other     Colon cancer Other     Malig Hyperthermia Neg Hx     Miscarriages / Stillbirths Neg Hx     Mental retardation Neg Hx     Mental illness Neg Hx     Learning disabilities Neg Hx     Kidney disease Neg Hx     Hyperlipidemia Neg Hx     Heart disease Neg Hx     Hearing loss Neg Hx     Early death Neg Hx     Drug abuse Neg Hx     Depression Neg Hx     COPD Neg Hx     Cancer Neg Hx     Bleeding Disorder Neg Hx     Birth defects Neg Hx     Asthma Neg Hx     Arthritis Neg Hx     Alcohol abuse Neg Hx     Breast cancer Neg Hx     Ovarian cancer Neg Hx     Uterine cancer Neg Hx     Melanoma Neg Hx     Prostate cancer Neg Hx       Social History:  reports that she has never smoked. She has never been exposed to tobacco smoke. She has never used smokeless tobacco.   reports that she does not currently use alcohol.   reports no history of drug use.        General ROS: Pertinent items are noted in HPI    Home Medications:  cetirizine, doxylamine, lactulose, ondansetron, prenatal vitamin, sodium chloride, and vitamin B-6    Allergies:  Allergies   Allergen Reactions    Sulfa Antibiotics Hives     Sulfacetamide Sodium Hives    Sulfamethoxazole Hives    Sulfamethoxazole-Trimethoprim Hives    Sulfasalazine Hives    Sulfonylureas Hives       Objective       Vital Signs Range for the last 24 hours  Temperature: Temp:  [97.9 °F (36.6 °C)-98.8 °F (37.1 °C)] 98.8 °F (37.1 °C)   Temp Source: Temp src: Oral   BP: BP: (119-138)/() 119/76   Pulse: Heart Rate:  [] 98   Respirations: Resp:  [17-24] 17   SPO2: SpO2:  [95 %-98 %] 98 %     Physical Examination:   General appearance - alert, ill appearing, diaphoretic  Mental status - alert, oriented to person, place, and time  Chest - clear to auscultation  Heart - normal rate, regular rhythm,  Abdomen - soft, diffusely tender to palpation, gravid, no rebound, no guarding  Pelvic - normal external genitalia, vulva, vagina, cervix, and uterus   Back exam - full range of motion, no tenderness or pain on motion  Neurological - alert, oriented, normal speech  Extremities - peripheral pulses normal, +1 edema lower extremities bilaterally  Skin - normal coloration and turgor, no suspicious skin lesions noted    Presentation: Cephalic per ultrasound performed today   Cervix: Method: sterile vaginal exam performed   Dilation: Cervical Dilation (cm): 0   Effacement: Cervical Effacement: 0   Station:         Fetal Heart Rate Assessment   Method: Fetal HR Assessment Method: external   Beats/min: Fetal HR (beats/min): 140   Baseline: Fetal HR Baseline: normal range   Variability: Fetal HR Variability: moderate (amplitude range 6 to 25 bpm)   Accels: Fetal HR Accelerations: greater than/equal to 15 bpm, lasting at least 15 seconds   Decels: Fetal HR Decelerations: absent   Tracing Category:       Uterine Assessment   Method: Method: external tocotransducer   Frequency (min): Contraction Frequency (Minutes): none   Ctx Count in 10 min:     Duration:     Intensity: Contraction Intensity: mild by palpation   Vernon Units:       GBS is negative     Assessment & Plan        Abdominal pain in pregnancy, third trimester        Assessment:  1.  Intrauterine pregnancy at 37w5d gestation with reactive, reassuring fetal status.    2.  Diffuse abdominal pain, no evidence of contractions  3.  Nausea, vomiting, and diarrhea  4.  GBS status:   Group B Strep, DNA   Date Value Ref Range Status   03/05/2025 Negative Negative Final       Plan:  1.  Abdominal pain/ N/V/D  - Patient ill-appearing and diaphoretic  - Concern for appendicitis  - MRI to evaluate for appendicitis ordered  - Amylase and lipase ordered and within normal limits  - Respiratory panel ordered and pending    2. FWB  - Tracing reactive and appropriate for gestational age  - Patient with BPP 6/8 on Saturday, 3/15/2025  - Repeat BPP ordered today 8/8 with STEFANIA 8.2 cm, MVP 5.23 cm  - As STEFANIA is somewhat borderline, can consider repeating STEFANIA tomorrow, 3/18/2025    3.  Elevated blood pressures  - Likely secondary to patient's discomfort  - CBC with hemoglobin 12.3, platelets 263 K  - CMP with creatinine 0.45 ALT 14, AST 14  - Urine P: C = 0.28  - No evidence of preeclampsia at this time    4.  Chest pain  - Patient diaphoretic and complaining of chest pain  - Stat EKG read as normal sinus rhythm  - High-sensitivity troponins negative x 3    Given how ill and uncomfortable patient appears, hospitalist service consulted for help in evaluating patient for nonobstetric causes of her symptoms.    Plan of care has been reviewed with patient and patient agrees.   Risks, benefits of treatment plan have been discussed.  All questions have been answered.        Electronically signed by Esha Hernandez MD, 03/17/25, 6:56 PM EDT.

## 2025-03-17 NOTE — OBED NOTES
POLI Kapadia  Obstetric History and Physical    Chief Complaint   Patient presents with    Abdominal Pain    Vomiting     N/V/D       Subjective     PNC provided by:  POLI    HPI:    Patient is a 29 y.o. female  currently at 37w5d, who presents with abdominal pain, N/V/D since  last night;  the pain started as coming and going and now is constant;  tylenol doesn't help;  pt states pain is generalized and not worse in 1 area;  no LOF, vb;  good FM .    Her prenatal care is complicated by  Obesity and Abnormal Fetal Growth macrosomia.  Her previous obstetric/gynecological history is noted for is non-contributory.    The following portions of the patients history were reviewed and updated as appropriate:   current medications, allergies, past medical history, past surgical history, past family history, past social history and current problem list.     Prenatal Information:  Prenatal Results       Initial Prenatal Labs       Test Value Reference Range Date Time    Hemoglobin  11.8 g/dL 12.0 - 15.9 10/19/24 1556       12.7 g/dL 12.0 - 15.9 24 1005       12.7 g/dL 12.0 - 15.9 24 0915    Hematocrit  34.7 % 34.0 - 46.6 10/19/24 1556       38.6 % 34.0 - 46.6 24 1005       36.9 % 34.0 - 46.6 24 0915    Platelets  241 10*3/mm3 140 - 450 10/19/24 1556       262 10*3/mm3 140 - 450 24 1005       248 10*3/mm3 140 - 450 24 0915    Rubella IgG  1.79 index Immune >0.99 24 1005    Hepatitis B SAg  Non-Reactive  Non-Reactive 24 1005    Hepatitis C Ab  Non-Reactive  Non-Reactive 24 1005       Non-Reactive  Non-Reactive 24 0915    RPR  Non-Reactive  Non-Reactive 24 1005    T. Pallidum Ab         ABO  A   24 1005    Rh  Positive   24 1005    Antibody Screen  Negative   24 1005    HIV  Non-Reactive  Non-Reactive 24 1005    Urine Culture  50,000 CFU/mL Normal Urogenital Vania   24 0752       >100,000 CFU/mL Mixed Vania Isolated   24  1005    Gonorrhea  Negative  Negative 09/20/24 1005    Chlamydia  Negative  Negative 09/20/24 1005    TSH  1.160 uIU/mL 0.270 - 4.200 09/11/24 0915    HgB A1c   5.00 % 4.80 - 5.60 09/20/24 1005    Varicella IgG        Hemoglobinopathy Fractionation  Comment   09/20/24 1005    Hemoglobinopathy (genetic testing)        Cystic fibrosis         Spinal muscular atrophy        Fragile X                  Fetal testing        Test Value Reference Range Date Time    NIPT        MSAFP        AFP-4                  2nd and 3rd Trimester       Test Value Reference Range Date Time    Hemoglobin (repeated)  11.6 g/dL 12.0 - 15.9 12/26/24 0733       12.3 g/dL 12.0 - 15.9 12/11/24 1003    Hematocrit (repeated)  34.5 % 34.0 - 46.6 12/26/24 0733       35.9 % 34.0 - 46.6 12/11/24 1003    Platelets   225 10*3/mm3 140 - 450 12/26/24 0733       259 10*3/mm3 140 - 450 12/11/24 1003       241 10*3/mm3 140 - 450 10/19/24 1556       262 10*3/mm3 140 - 450 09/20/24 1005       248 10*3/mm3 140 - 450 09/11/24 0915    1 hour GTT   103 mg/dL 65 - 139 12/11/24 1003       106 mg/dL 65 - 139 10/23/24 1004    Antibody Screen (repeated)        3rd TM syphilis scrn (repeated)  RPR         3rd TM syphilis scrn (repeated) TP-Ab        3rd TM syphilis screen TB-Ab (FTA)        Syphilis cascade test TP-Ab (EIA)        Syphilis cascade TPPA        GTT Fasting        GTT 1 Hr        GTT 2 Hr        GTT 3 Hr        Group B Strep  Negative  Negative 03/05/25 1430              Other testing        Test Value Reference Range Date Time    Parvo IgG         CMV IgG                   Drug Screening       Test Value Reference Range Date Time    Amphetamine Screen        Barbiturate Screen  Negative  Negative 09/20/24 1005    Benzodiazepine Screen  Negative  Negative 09/20/24 1005    Methadone Screen  Negative  Negative 09/20/24 1005    Phencyclidine Screen        Opiates Screen  Negative  Negative 09/20/24 1005    THC Screen  Negative  Negative 09/20/24 1005     Cocaine Screen  Negative  Negative 09/20/24 1005    Propoxyphene Screen        Buprenorphine Screen        Methamphetamine Screen        Oxycodone Screen  Negative  Negative 09/20/24 1005    Tricyclic Antidepressants Screen                  Legend    ^: Historical                          External Prenatal Results       Pregnancy Outside Results - Transcribed From Office Records - See Scanned Records For Details       Test Value Date Time    ABO  A  09/20/24 1005    Rh  Positive  09/20/24 1005    Antibody Screen  Negative  09/20/24 1005    Varicella IgG       Rubella  1.79 index 09/20/24 1005    Hgb  11.6 g/dL 12/26/24 0733       12.3 g/dL 12/11/24 1003       11.8 g/dL 10/19/24 1556       12.7 g/dL 09/20/24 1005       12.7 g/dL 09/11/24 0915    Hct  34.5 % 12/26/24 0733       35.9 % 12/11/24 1003       34.7 % 10/19/24 1556       38.6 % 09/20/24 1005       36.9 % 09/11/24 0915    HgB A1c   5.00 % 09/20/24 1005    1h GTT  103 mg/dL 12/11/24 1003       106 mg/dL 10/23/24 1004    3h GTT Fasting       3h GTT 1 hour       3h GTT 2 hour       3h GTT 3 hour        Gonorrhea (discrete)  Negative  09/20/24 1005    Chlamydia (discrete)  Negative  09/20/24 1005    RPR  Non-Reactive  09/20/24 1005    Syphils cascade: TP-Ab (FTA)       TP-Ab       TP-Ab (EIA)       TPPA       HBsAg  Non-Reactive  09/20/24 1005    Herpes Simplex Virus PCR       Herpes Simplex VIrus Culture       HIV  Non-Reactive  09/20/24 1005    Hep C RNA Quant PCR       Hep C Antibody  Non-Reactive  09/20/24 1005       Non-Reactive  09/11/24 0915    AFP       NIPT       Cystic Fibrosis (Queta)       Cystic Fibroisis        Spinal Muscular atrophy       Fragile X       Group B Strep  Negative  03/05/25 1430    GBS Susceptibility to Clindamycin       GBS Susceptibility to Erythromycin       Fetal Fibronectin       Genetic Testing, Maternal Blood                 Drug Screening       Test Value Date Time    Urine Drug Screen       Amphetamine Screen        Barbiturate Screen  Negative  24 1005    Benzodiazepine Screen  Negative  24 1005    Methadone Screen  Negative  24 1005    Phencyclidine Screen       Opiates Screen  Negative  24 1005    THC Screen  Negative  24 1005    Cocaine Screen       Propoxyphene Screen       Buprenorphine Screen       Methamphetamine Screen       Oxycodone Screen  Negative  24 1005    Tricyclic Antidepressants Screen                 Legend    ^: Historical                             Problem List:     Patient Active Problem List   Diagnosis    Arthritis    Back pain    Bilateral carpal tunnel syndrome    Class 3 severe obesity without serious comorbidity with body mass index (BMI) of 50.0 to 59.9 in adult    ETD (eustachian tube dysfunction)    Insulin resistance    Numbness    Supervision of normal first pregnancy, antepartum    Obesity affecting pregnancy, antepartum    Excessive fetal growth affecting management of pregnancy in third trimester        Past OB History:     OB History    Para Term  AB Living   1 0 0 0 0 0   SAB IAB Ectopic Molar Multiple Live Births   0 0 0 0 0 0      # Outcome Date GA Lbr Stevenson/2nd Weight Sex Type Anes PTL Lv   1 Current                Past Medical History: Past Medical History:   Diagnosis Date    Allergies     Ankle fracture, left 2024    Carpal tunnel syndrome     LEFT    Hyperlipidemia     Prediabetes       Past Surgical History Past Surgical History:   Procedure Laterality Date    ADENOIDECTOMY      CARPAL TUNNEL RELEASE Right     CARPAL TUNNEL RELEASE Left 2023    Procedure: CARPAL TUNNEL RELEASE;  Surgeon: Bill Jones MD;  Location: [facility] [place];  Service: Orthopedics;  Laterality: Left;    TONSILLECTOMY  2012    TYMPANOSTOMY TUBE PLACEMENT      x2      Family History: Family History   Problem Relation Age of Onset    Hypertension Father     Diabetes Father     Hypertension Mother     Diabetes Mother     Hypertension Brother      Diabetes Paternal Grandfather     Diabetes Maternal Grandmother     Deep vein thrombosis Other     Colon cancer Other     Malig Hyperthermia Neg Hx     Miscarriages / Stillbirths Neg Hx     Mental retardation Neg Hx     Mental illness Neg Hx     Learning disabilities Neg Hx     Kidney disease Neg Hx     Hyperlipidemia Neg Hx     Heart disease Neg Hx     Hearing loss Neg Hx     Early death Neg Hx     Drug abuse Neg Hx     Depression Neg Hx     COPD Neg Hx     Cancer Neg Hx     Bleeding Disorder Neg Hx     Birth defects Neg Hx     Asthma Neg Hx     Arthritis Neg Hx     Alcohol abuse Neg Hx     Breast cancer Neg Hx     Ovarian cancer Neg Hx     Uterine cancer Neg Hx     Melanoma Neg Hx     Prostate cancer Neg Hx       Social History:  reports that she has never smoked. She has never been exposed to tobacco smoke. She has never used smokeless tobacco.   reports that she does not currently use alcohol.   reports no history of drug use.        General ROS: Pertinent items are noted in HPI        Home Medications:  cetirizine, doxylamine, lactulose, ondansetron, prenatal vitamin, sodium chloride, and vitamin B-6    Allergies:  Allergies   Allergen Reactions    Sulfa Antibiotics Hives    Sulfacetamide Sodium Hives    Sulfamethoxazole Hives    Sulfamethoxazole-Trimethoprim Hives    Sulfasalazine Hives    Sulfonylureas Hives       Objective       Vital Signs Range for the last 24 hours  Temperature: Temp:  [97.9 °F (36.6 °C)] 97.9 °F (36.6 °C)   Temp Source: Temp src: Oral   BP: BP: (127-130)/(86-91) 128/87   Pulse: Heart Rate:  [103-112] 103   Respirations: Resp:  [24] 24   SPO2: SpO2:  [97 %] 97 %     Physical Examination:   General appearance - alert, well appearing, and in no distress  Mental status - alert, oriented to person, place, and time  Abdomen - soft, nontender, nondistended, pt states pain with palpation but no  grimace/etc.  Pelvic - normal external genitalia, vulva, vagina, cervix, and uterus   Back exam -  full range of motion, no tenderness or pain on motion  Neurological - alert, oriented, normal speech  Extremities - peripheral pulses normal  Skin - normal coloration and turgor, no suspicious skin lesions noted    Presentation: unknown   Cervix: Method: sterile vaginal exam performed   Dilation: Cervical Dilation (cm): 0   Effacement: Cervical Effacement: 0   Station:         Fetal Heart Rate Assessment :  140s, GLTV, R, cat 1  Method:     Beats/min:     Baseline:     Variability:     Accels:     Decels:     Tracing Category:       Uterine Assessment :  occ  Method:     Frequency (min):     Ctx Count in 10 min:     Duration:     Intensity:     [place] Units:       Lab Results (last 24 hours)       Procedure Component Value Units Date/Time    POC Urinalysis Dipstick [[id number]] Collected: 03/17/25 0605    Specimen: Urine Updated: 03/17/25 0607     Color Yellow     Clarity, UA Clear     Glucose, UA Negative mg/dL      Bilirubin Negative     Ketones, UA Negative     Specific Gravity  1.020     Blood, UA Negative     pH, Urine 6.0     Protein, POC Negative mg/dL      Urobilinogen, UA 0.2 E.U./dL     Leukocytes Negative     Nitrite, UA Negative             GBS is negative     Assessment & Plan     Abdominal pain  N/v/d  Elevated BP reading      Assessment:  1.  Intrauterine pregnancy at 37w5d gestation with reactive fetal status.    2.  Abdominal pain  3.  Obstetrical history significant for is non-contributory.  4.  GBS status:   Group B Strep, DNA   Date Value Ref Range Status   03/05/2025 Negative Negative Final       Plan:  1. fetal and uterine monitoring continuously, draw cmp, amylase, lipase, P:C ratio, cbc;  pt declines anti emetic;  will turn over to Dr. Hernandez  2.  Plan of care has been reviewed with patient and patient agrees.   3.  Risks, benefits of treatment plan have been discussed.  4.  All questions have been answered.        Electronically signed by Azucena Benton MD, 03/17/25, 6:46 AM EDT.

## 2025-03-18 VITALS
WEIGHT: 293 LBS | DIASTOLIC BLOOD PRESSURE: 80 MMHG | RESPIRATION RATE: 18 BRPM | HEART RATE: 103 BPM | BODY MASS INDEX: 43.4 KG/M2 | TEMPERATURE: 98 F | OXYGEN SATURATION: 98 % | HEIGHT: 69 IN | SYSTOLIC BLOOD PRESSURE: 126 MMHG

## 2025-03-18 LAB
ANION GAP SERPL CALCULATED.3IONS-SCNC: 12 MMOL/L (ref 5–15)
BASOPHILS # BLD AUTO: 0.06 10*3/MM3 (ref 0–0.2)
BASOPHILS NFR BLD AUTO: 0.6 % (ref 0–1.5)
BH CV LOWER VASCULAR LEFT COMMON FEMORAL AUGMENT: NORMAL
BH CV LOWER VASCULAR LEFT COMMON FEMORAL COMPETENT: NORMAL
BH CV LOWER VASCULAR LEFT COMMON FEMORAL COMPRESS: NORMAL
BH CV LOWER VASCULAR LEFT COMMON FEMORAL PHASIC: NORMAL
BH CV LOWER VASCULAR LEFT COMMON FEMORAL SPONT: NORMAL
BH CV LOWER VASCULAR LEFT DISTAL FEMORAL COMPRESS: NORMAL
BH CV LOWER VASCULAR LEFT GASTRONEMIUS COMPRESS: NORMAL
BH CV LOWER VASCULAR LEFT GREATER SAPH AK AUGMENT: NORMAL
BH CV LOWER VASCULAR LEFT GREATER SAPH AK COMPETENT: NORMAL
BH CV LOWER VASCULAR LEFT GREATER SAPH AK COMPRESS: NORMAL
BH CV LOWER VASCULAR LEFT GREATER SAPH AK PHASIC: NORMAL
BH CV LOWER VASCULAR LEFT GREATER SAPH AK SPONT: NORMAL
BH CV LOWER VASCULAR LEFT GREATER SAPH BK COMPRESS: NORMAL
BH CV LOWER VASCULAR LEFT LESSER SAPH COMPRESS: NORMAL
BH CV LOWER VASCULAR LEFT MID FEMORAL AUGMENT: NORMAL
BH CV LOWER VASCULAR LEFT MID FEMORAL COMPETENT: NORMAL
BH CV LOWER VASCULAR LEFT MID FEMORAL COMPRESS: NORMAL
BH CV LOWER VASCULAR LEFT MID FEMORAL PHASIC: NORMAL
BH CV LOWER VASCULAR LEFT MID FEMORAL SPONT: NORMAL
BH CV LOWER VASCULAR LEFT PERONEAL AUGMENT: NORMAL
BH CV LOWER VASCULAR LEFT PERONEAL COMPETENT: NORMAL
BH CV LOWER VASCULAR LEFT PERONEAL COMPRESS: NORMAL
BH CV LOWER VASCULAR LEFT PERONEAL PHASIC: NORMAL
BH CV LOWER VASCULAR LEFT PERONEAL SPONT: NORMAL
BH CV LOWER VASCULAR LEFT POPLITEAL AUGMENT: NORMAL
BH CV LOWER VASCULAR LEFT POPLITEAL COMPETENT: NORMAL
BH CV LOWER VASCULAR LEFT POPLITEAL COMPRESS: NORMAL
BH CV LOWER VASCULAR LEFT POPLITEAL PHASIC: NORMAL
BH CV LOWER VASCULAR LEFT POPLITEAL SPONT: NORMAL
BH CV LOWER VASCULAR LEFT POSTERIOR TIBIAL AUGMENT: NORMAL
BH CV LOWER VASCULAR LEFT POSTERIOR TIBIAL COMPETENT: NORMAL
BH CV LOWER VASCULAR LEFT POSTERIOR TIBIAL COMPRESS: NORMAL
BH CV LOWER VASCULAR LEFT POSTERIOR TIBIAL PHASIC: NORMAL
BH CV LOWER VASCULAR LEFT POSTERIOR TIBIAL SPONT: NORMAL
BH CV LOWER VASCULAR LEFT PROXIMAL FEMORAL COMPRESS: NORMAL
BH CV LOWER VASCULAR RIGHT COMMON FEMORAL AUGMENT: NORMAL
BH CV LOWER VASCULAR RIGHT COMMON FEMORAL COMPETENT: NORMAL
BH CV LOWER VASCULAR RIGHT COMMON FEMORAL COMPRESS: NORMAL
BH CV LOWER VASCULAR RIGHT COMMON FEMORAL PHASIC: NORMAL
BH CV LOWER VASCULAR RIGHT COMMON FEMORAL SPONT: NORMAL
BH CV LOWER VASCULAR RIGHT DISTAL FEMORAL COMPRESS: NORMAL
BH CV LOWER VASCULAR RIGHT GASTRONEMIUS COMPRESS: NORMAL
BH CV LOWER VASCULAR RIGHT GREATER SAPH AK AUGMENT: NORMAL
BH CV LOWER VASCULAR RIGHT GREATER SAPH AK COMPETENT: NORMAL
BH CV LOWER VASCULAR RIGHT GREATER SAPH AK COMPRESS: NORMAL
BH CV LOWER VASCULAR RIGHT GREATER SAPH AK PHASIC: NORMAL
BH CV LOWER VASCULAR RIGHT GREATER SAPH AK SPONT: NORMAL
BH CV LOWER VASCULAR RIGHT GREATER SAPH BK COMPRESS: NORMAL
BH CV LOWER VASCULAR RIGHT LESSER SAPH COMPRESS: NORMAL
BH CV LOWER VASCULAR RIGHT MID FEMORAL AUGMENT: NORMAL
BH CV LOWER VASCULAR RIGHT MID FEMORAL COMPETENT: NORMAL
BH CV LOWER VASCULAR RIGHT MID FEMORAL COMPRESS: NORMAL
BH CV LOWER VASCULAR RIGHT MID FEMORAL PHASIC: NORMAL
BH CV LOWER VASCULAR RIGHT MID FEMORAL SPONT: NORMAL
BH CV LOWER VASCULAR RIGHT PERONEAL AUGMENT: NORMAL
BH CV LOWER VASCULAR RIGHT PERONEAL COMPETENT: NORMAL
BH CV LOWER VASCULAR RIGHT PERONEAL COMPRESS: NORMAL
BH CV LOWER VASCULAR RIGHT PERONEAL PHASIC: NORMAL
BH CV LOWER VASCULAR RIGHT PERONEAL SPONT: NORMAL
BH CV LOWER VASCULAR RIGHT POPLITEAL AUGMENT: NORMAL
BH CV LOWER VASCULAR RIGHT POPLITEAL COMPETENT: NORMAL
BH CV LOWER VASCULAR RIGHT POPLITEAL COMPRESS: NORMAL
BH CV LOWER VASCULAR RIGHT POPLITEAL PHASIC: NORMAL
BH CV LOWER VASCULAR RIGHT POPLITEAL SPONT: NORMAL
BH CV LOWER VASCULAR RIGHT POSTERIOR TIBIAL AUGMENT: NORMAL
BH CV LOWER VASCULAR RIGHT POSTERIOR TIBIAL COMPETENT: NORMAL
BH CV LOWER VASCULAR RIGHT POSTERIOR TIBIAL COMPRESS: NORMAL
BH CV LOWER VASCULAR RIGHT POSTERIOR TIBIAL PHASIC: NORMAL
BH CV LOWER VASCULAR RIGHT POSTERIOR TIBIAL SPONT: NORMAL
BH CV LOWER VASCULAR RIGHT PROXIMAL FEMORAL COMPRESS: NORMAL
BUN SERPL-MCNC: 8 MG/DL (ref 6–20)
BUN/CREAT SERPL: 18.2 (ref 7–25)
C COLI+JEJ+UPSA DNA STL QL NAA+NON-PROBE: NOT DETECTED
CALCIUM SPEC-SCNC: 9.1 MG/DL (ref 8.6–10.5)
CHLORIDE SERPL-SCNC: 103 MMOL/L (ref 98–107)
CO2 SERPL-SCNC: 20 MMOL/L (ref 22–29)
CREAT SERPL-MCNC: 0.44 MG/DL (ref 0.57–1)
CRYPTOSP DNA STL QL NAA+NON-PROBE: NOT DETECTED
DEPRECATED RDW RBC AUTO: 42.9 FL (ref 37–54)
E HISTOLYT DNA STL QL NAA+NON-PROBE: NOT DETECTED
EC STX1+STX2 GENES STL QL NAA+NON-PROBE: NOT DETECTED
EGFRCR SERPLBLD CKD-EPI 2021: 134.5 ML/MIN/1.73
EOSINOPHIL # BLD AUTO: 0.14 10*3/MM3 (ref 0–0.4)
EOSINOPHIL NFR BLD AUTO: 1.4 % (ref 0.3–6.2)
ERYTHROCYTE [DISTWIDTH] IN BLOOD BY AUTOMATED COUNT: 13.8 % (ref 12.3–15.4)
G LAMBLIA DNA STL QL NAA+NON-PROBE: NOT DETECTED
GLUCOSE SERPL-MCNC: 99 MG/DL (ref 65–99)
HCT VFR BLD AUTO: 33.7 % (ref 34–46.6)
HGB BLD-MCNC: 11.4 G/DL (ref 12–15.9)
IMM GRANULOCYTES # BLD AUTO: 0.15 10*3/MM3 (ref 0–0.05)
IMM GRANULOCYTES NFR BLD AUTO: 1.5 % (ref 0–0.5)
LYMPHOCYTES # BLD AUTO: 2.1 10*3/MM3 (ref 0.7–3.1)
LYMPHOCYTES NFR BLD AUTO: 20.7 % (ref 19.6–45.3)
MCH RBC QN AUTO: 29.2 PG (ref 26.6–33)
MCHC RBC AUTO-ENTMCNC: 33.8 G/DL (ref 31.5–35.7)
MCV RBC AUTO: 86.2 FL (ref 79–97)
MONOCYTES # BLD AUTO: 0.56 10*3/MM3 (ref 0.1–0.9)
MONOCYTES NFR BLD AUTO: 5.5 % (ref 5–12)
NEUTROPHILS NFR BLD AUTO: 7.14 10*3/MM3 (ref 1.7–7)
NEUTROPHILS NFR BLD AUTO: 70.3 % (ref 42.7–76)
NRBC BLD AUTO-RTO: 0 /100 WBC (ref 0–0.2)
PLATELET # BLD AUTO: 230 10*3/MM3 (ref 140–450)
PMV BLD AUTO: 9.3 FL (ref 6–12)
POTASSIUM SERPL-SCNC: 3.8 MMOL/L (ref 3.5–5.2)
RBC # BLD AUTO: 3.91 10*6/MM3 (ref 3.77–5.28)
S ENT+BONG DNA STL QL NAA+NON-PROBE: NOT DETECTED
SHIGELLA SP+EIEC IPAH ST NAA+NON-PROBE: NOT DETECTED
SODIUM SERPL-SCNC: 135 MMOL/L (ref 136–145)
WBC NRBC COR # BLD AUTO: 10.15 10*3/MM3 (ref 3.4–10.8)

## 2025-03-18 PROCEDURE — 99232 SBSQ HOSP IP/OBS MODERATE 35: CPT | Performed by: STUDENT IN AN ORGANIZED HEALTH CARE EDUCATION/TRAINING PROGRAM

## 2025-03-18 PROCEDURE — 59025 FETAL NON-STRESS TEST: CPT

## 2025-03-18 PROCEDURE — 85025 COMPLETE CBC W/AUTO DIFF WBC: CPT | Performed by: STUDENT IN AN ORGANIZED HEALTH CARE EDUCATION/TRAINING PROGRAM

## 2025-03-18 PROCEDURE — 80048 BASIC METABOLIC PNL TOTAL CA: CPT | Performed by: STUDENT IN AN ORGANIZED HEALTH CARE EDUCATION/TRAINING PROGRAM

## 2025-03-18 RX ADMIN — AMOXICILLIN 500 MG: 500 CAPSULE ORAL at 05:59

## 2025-03-18 NOTE — DISCHARGE SUMMARY
Date of admission: 3/17/2025    Admission diagnosis: IUP at 37 weeks estimate gestational age.  Nausea/vomiting and abdominal pain.    Admitting physician: Sujey Saucedo MD    Discharge date: 3/18/2025    Discharge diagnosis: Same.  Likely viral gastroenteritis    Discharge physician: Scott Matthews MD    Discharge condition: Improved    Disposition: Discharge home    Hospital course and discharge exam:  Patient is a 29-year-old  1 para 0 female at 37 weeks and 6 days estimate gestational age who presented to labor and delivery early yesterday morning with complaint of nausea, vomiting, abdominal pain.  Her workup including MRI of the abdomen, lower extremity Dopplers and various labs including blood cultures and biophysical profile.  Medicine service was consulted and they were concerned about possible Listeria infection she was started on amoxicillin.  Blood cultures were negative at 24 hours.  Medicine saw the patient and stopped antibiotics and patient was cleared to go home.  At this time patient reports feeling much better.  She is tolerating p.o. intake without any difficulty.  She reports active fetal movements.  She denies any vaginal bleeding.  She denies any loss of fluid.  At the time of discharge:  She is afebrile vital signs stable  Lungs are clear to auscultation bilaterally  Heart is regular rhythm  Abdomen soft nontender/gravid  Patient will keep her appointment on Thursday with her OB provider.  She was given strict fetal kick count and labor precautions.

## 2025-03-18 NOTE — PROGRESS NOTES
[facility]   Hospitalist Progress Note  Date: 3/18/2025  Patient Name: Jessie Schultz  : 1995  MRN: [id number]  Date of admission: 3/17/2025  Room/Bed: L442/1      Subjective   Subjective     Chief Complaint: nausea, vomiting, abdominal pain     Summary:Jessie Schultz is a 29 y.o. female  37w5d who presented abdominal pain, nausea, vomiting and diarrhea since last night.  Abdominal pain was intermittent which gradually became constant, patient took Tylenol with minimal improvement.  Patient then presented for further evaluation management.  OB/GYN on board, and admitted for further evaluation and management.  Medicine was consulted for medical management of her presenting symptoms.  Lab work showed leukocytosis of 12.71.  UA negative.  Lipase and Mytelase normal.  First troponin negative.  EKG showed no acute ST or T wave changes.  Blood culture drawn and MRI abdomen pelvis without contrast was obtained which showed no acute abnormality abdomen with intrauterine pregnancy with cephalic fetal lie.  During my evaluation, patient complaining of diffuse lower abdominal pain with persistent diarrhea, nausea and vomiting.  Complaining of some chills but denied any fever or rigors.  Denies any urinary symptoms.  Denied any deli use, store salad, unpasteurized dairy products or exposure to listeria. No sick contacts.     Interval Followup: NO acute events overnight. No nausea, vomiting or abdominal pain today. Looks so much better clinically. Able to tolerate regular diet as well. Cheerfully sitting at the edge of the bed, not in acute distress.  Blood culture showing no growth in 24 hours. Enteric parasitic and bacterial panel negative.    Review of Systems    All systems reviewed and negative except for what is outlined above.      Objective   Objective     Vitals:   Temp:  [98.1 °F (36.7 °C)-99 °F (37.2 °C)] 99 °F (37.2 °C)  Heart Rate:  [89-99] 94  Resp:  [16-18] 16  BP: (105-131)/(61-90)  105/61    Physical Exam   General: Awake, alert, NAD  Cardiovascular: RRR, no murmurs   Pulmonary: CTA bilaterally; no wheezes; no conversational dyspnea  Gastrointestinal: S/distended/NT, +BS  Neuro: alert, awake, oriented x 3; speech clear      Result Review    Result Review:  I have personally reviewed these results:  [x]  Laboratory      Lab 03/18/25  0553 03/17/25  0946 03/17/25  0720   WBC 10.15  --  12.71*   HEMOGLOBIN 11.4*  --  12.3   HEMATOCRIT 33.7*  --  37.3   PLATELETS 230  --  263   NEUTROS ABS 7.14*  --   --    IMMATURE GRANS (ABS) 0.15*  --   --    LYMPHS ABS 2.10  --   --    MONOS ABS 0.56  --   --    EOS ABS 0.14  --   --    MCV 86.2  --  85.6   D DIMER QUANT  --  1.51*  --          Lab 03/18/25  0553 03/17/25  0720   SODIUM 135* 136   POTASSIUM 3.8 4.5   CHLORIDE 103 106   CO2 20.0* 18.8*   ANION GAP 12.0 11.2   BUN 8 7   CREATININE 0.44* 0.45*   EGFR 134.5 133.7   GLUCOSE 99 117*   CALCIUM 9.1 9.5         Lab 03/17/25  0720   TOTAL PROTEIN 7.1   ALBUMIN 3.4*   GLOBULIN 3.7   ALT (SGPT) 14   AST (SGOT) 14   BILIRUBIN <0.2   ALK PHOS 117   AMYLASE 48   LIPASE 29         Lab 03/17/25  1149 03/17/25  0946 03/17/25  0720   HSTROP T <6 <6 <6                 Brief Urine Lab Results  (Last result in the past 365 days)        Color   Clarity   Blood   Leuk Est   Nitrite   Protein   CREAT   Urine HCG        03/17/25 0712             42.7               [x]  Microbiology   Microbiology Results (last 10 days)       Procedure Component Value - Date/Time    Clostridioides difficile Toxin, PCR - Stool, Per Rectum [[id number]] Collected: 03/17/25 1050    Lab Status: Final result Specimen: Stool from Per Rectum Updated: 03/17/25 1151     Toxigenic C. difficile by PCR Negative     027 Toxin Presumptive Negative    Narrative:      The result indicates the absence of toxigenic C. difficile from stool specimen.     Respiratory Panel PCR w/COVID-19(SARS-CoV-2) DEEPAK/IVÁN/MAIRA/PAD/COR/USHA In-House, NP Swab in UTM/VTM, 2 HR  TAT - Swab, Nasopharynx [[id number]]  (Normal) Collected: 03/17/25 0936    Lab Status: Final result Specimen: Swab from Nasopharynx Updated: 03/17/25 1038     ADENOVIRUS, PCR Not Detected     Coronavirus 229E Not Detected     Coronavirus HKU1 Not Detected     Coronavirus NL63 Not Detected     Coronavirus OC43 Not Detected     COVID19 Not Detected     Human Metapneumovirus Not Detected     Human Rhinovirus/Enterovirus Not Detected     Influenza A PCR Not Detected     Influenza B PCR Not Detected     Parainfluenza Virus 1 Not Detected     Parainfluenza Virus 2 Not Detected     Parainfluenza Virus 3 Not Detected     Parainfluenza Virus 4 Not Detected     RSV, PCR Not Detected     Bordetella pertussis pcr Not Detected     Bordetella parapertussis PCR Not Detected     Chlamydophila pneumoniae PCR Not Detected     Mycoplasma pneumo by PCR Not Detected    Narrative:      In the setting of a positive respiratory panel with a viral infection PLUS a negative procalcitonin without other underlying concern for bacterial infection, consider observing off antibiotics or discontinuation of antibiotics and continue supportive care. If the respiratory panel is positive for atypical bacterial infection (Bordetella pertussis, Chlamydophila pneumoniae, or Mycoplasma pneumoniae), consider antibiotic de-escalation to target atypical bacterial infection.          [x]  Radiology  US Fetal Biophysical Profile;Without Non-Stress Testing  Result Date: 3/17/2025  Impression: 1. Normal biophysical profile. 2. STEFANIA 8.2 cm previously 11.8 cm. 3. Fetal heart rate 148 bpm. Electronically Signed: Reyes Fong MD  3/17/2025 1:29 PM EDT  Workstation ID: [id number]    MRI Abdomen Without Contrast  Result Date: 3/17/2025  Impression: 1. Normal appendix. 2. No acute abnormality in the abdomen. 3. Mild splenomegaly. 4. Intrauterine pregnancy with cephalic fetal lie  Electronically Signed: Bharat Urbina MD  3/17/2025 9:23 AM EDT  Workstation ID:  [id number]     Fetal Biophysical Profile;Without Non-Stress Testing  Result Date: 3/15/2025  Impression: Biophysical profile: 68. Recommend follow-up as clinically indicated Findings were called to labor and delivery at the time of interpretation Electronically Signed: Emil Gustafson MD  3/15/2025 11:26 AM EDT  Workstation ID: [id number]    []  EKG/Telemetry   []  Cardiology/Vascular   []  Pathology  []  Old records  []  Other:    Assessment & Plan   Assessment / Plan     Assessment:  Nausea, vomiting, resolved  Diarrhea, resolved  Possible viral gastroenteristis  ; 35w5d    Plan:  Patient is being managed in Ob/Gyn unit.  Medicine consulted for her ongoing symptoms.  Admitted for nausea, vomiting, diarrhea and abdominal pain which has resolved today.  Was started on PO amoxicillin 500 mg every 8 hours for empiric coverage for Listeria. Blood culture negative in 24 hours; discontinuing Amoxicillin.  Enteric parasitic and bacterial panel negative. Negative C diff.  Symptomatic management for now.  Dehydration resolved; seems well hydrated today.  S/p IV thiamine.  Lower extremity duplex negative for DVT.  Patient has made significant recovery and okay to be discharged from medicine standpoint.  Rest as per Ob/Gyn.  Thank you for involving us in care of the patient.     Discussed with RN.    VTE Prophylaxis:  Mechanical VTE prophylaxis orders are present.           Electronically signed by Silvia Hammond MD, 25, 8:45 AM EDT.

## 2025-03-18 NOTE — NON STRESS TEST
"Obstetrical Non-stress Test Interpretation     Name:  Jessie Schultz  MRN: [id number]    29 y.o. female  at 37w6d    Indication: obs for n/v, diarrhea      Fetal Assessment  Fetal Movement: active  Fetal HR Assessment Method: external  Fetal HR (beats/min): 140  Fetal HR Baseline: normal range  Fetal HR Variability: moderate (amplitude range 6 to 25 bpm)  Fetal HR Accelerations: greater than/equal to 15 bpm, lasting at least 15 seconds  Fetal HR Decelerations: variable  Sinusoidal Pattern Present: absent    /79 (BP Location: Left arm, Patient Position: Sitting)   Pulse 104   Temp 98.3 °F (36.8 °C) (Oral)   Resp 18   Ht 175.3 cm (69\")   Wt (!) 141 kg (310 lb)   LMP 2024   SpO2 98%   BMI 45.78 kg/m²     Reason for test: Other (Comment) (observation)  Date of Test: 3/18/2025  Time frame of test: 0437-4955  RN NST Interpretation: Raghav Murguia RN  3/18/2025  11:28 EDT  "

## 2025-03-18 NOTE — PLAN OF CARE
Goal Outcome Evaluation:           Progress: improving  Outcome Evaluation: No n/v/d this shift. VSS. NST reactive. No c/o chest pain or shortness of breath. Has eaten solid food and taken liquids with no n/v. Rested very well during night with no s/s of distress noted.

## 2025-03-18 NOTE — PROGRESS NOTES
"[facility]  Obstetric Progress Note    Subjective     Patient:    The patient reports she is feeling better.  Patient has been able to tolerate p.o. and states her abdominal pain has decreased.    Objective     Vital Signs Range for the last 24 hours  Temp:  [97.9 °F (36.6 °C)-98.8 °F (37.1 °C)] 98.8 °F (37.1 °C)   Temp src: Oral   BP: (119-138)/() 119/76   Heart Rate:  [] 96   Resp:  [17-24] 17        Flowsheet Rows      Flowsheet Row First Filed Value   Admission Height 175.3 cm (69\") Documented at 03/17/2025 0551   Admission Weight 141 kg (310 lb) Documented at 03/17/2025 0551              Physical Exam:  General: Patient is well appearing         Cervix: Method: sterile vaginal exam performed   Dilation: Cervical Dilation (cm): 0   Effacement: Cervical Effacement: 0   Station:       Fetal Heart Rate Assessment   Method: Fetal HR Assessment Method: external   Beats/min: Fetal HR (beats/min): 135   Baseline: Fetal HR Baseline: normal range   Variability: Fetal HR Variability: moderate (amplitude range 6 to 25 bpm)   Accels: Fetal HR Accelerations: greater than/equal to 15 bpm, lasting at least 15 seconds   Decels: Fetal HR Decelerations: absent     Uterine Assessment   Method: Method: palpation, external tocotransducer   Frequency (min): Contraction Frequency (Minutes): None   Ctx Count in 10 min:     Duration:     Intensity: Contraction Intensity: mild by palpation   Intensity by IUPC:     Resting Tone: Uterine Resting Tone: soft by palpation   Resting Tone by IUPC:     Jacqueline Units:         Assessment & Plan       Abdominal pain in pregnancy, third trimester        Assessment:  1.  Intrauterine pregnancy at 37w6d gestation with reactive fetal status.        Plan:     1.  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 every 8 hours for Listeria coverage   until the blood " cultures return   - Medicine also recommended obtaining enteric parasitic and bacterial panels which are   currently pending  - C. difficile PCR was negative  - Fibrinogen 626 which suggests against abruption  - Respiratory panel was negative    2. Chest Pain  - D-dimer ordered and was 1.51 which is considered to be in the normal range for the   third trimester  - Per medicine, will hold off on CT PE protocol given value of D-dimer, and lower   extremity Dopplers ordered instead  - Patient given verbal report that lower extremity Dopplers were negative, however the   report has not been resulted and finalized    Appreciate internal medicine input and assistance with management of patient  Labs pending for the morning      Plan of care has been reviewed with patient   All questions have been answered.      Electronically signed by Esha Hernandez MD, 03/18/25, 2:11 AM EDT.

## 2025-03-19 LAB
QT INTERVAL: 331 MS
QTC INTERVAL: 426 MS

## 2025-03-19 NOTE — PROGRESS NOTES
Routine Prenatal Visit     Subjective  Jessie Schultz is a 29 y.o.  at 38w1d here for her routine OB visit.   She is taking her prenatal vitamins.Reports no loss of fluid or vaginal bleeding. Patient doing well. Recently admitted due to abdominal pain and diarrhea. States she is feeling better.     Pregnancy complicated by:     Patient Active Problem List   Diagnosis    Arthritis    Back pain    Bilateral carpal tunnel syndrome    Class 3 severe obesity without serious comorbidity with body mass index (BMI) of 50.0 to 59.9 in adult    ETD (eustachian tube dysfunction)    Insulin resistance    Numbness    Supervision of normal first pregnancy, antepartum    Obesity affecting pregnancy, antepartum    Excessive fetal growth affecting management of pregnancy in third trimester    Abdominal pain in pregnancy, third trimester         OB History    Para Term  AB Living   1        SAB IAB Ectopic Molar Multiple Live Births              # Outcome Date GA Lbr Stevenson/2nd Weight Sex Type Anes PTL Lv   1 Current                    ROS:  General ROS: negative for - chills or fatigue  Genito-Urinary ROS: negative for  change in urinary stream, vaginal discharge     Objective  Physical Exam:   Vitals:    25 1414   BP: 111/77       Uterine Size: pannus  FHT: 110-160 BPM         Assessment/Plan:   Diagnoses and all orders for this visit:    1. Supervision of normal first pregnancy, antepartum (Primary)  -     POC Urinalysis Dipstick    2. Excessive fetal growth affecting management of pregnancy in third trimester, single or unspecified fetus    3. Obesity affecting pregnancy, antepartum, unspecified obesity type            Counseling:   OB precautions, leaking, VB, jyoti killian vs PTL/Labor  FK  Round Ligament Pain:  The uterus has several ligaments which provide support and keep the uterus in place. As the  uterus grows these ligaments are pulled and stretched which often causes sharp stabbing like pain in  the inguinal area.   You may find a pregnancy support band helpful. Changing positions may also help. Yoga is a great way to cope with round ligament and low back pain in pregnancy.    Massage may also help with low back pain   Things to Consider at this Point in your Pregnancy:  Some women experience swelling in their feet during pregnancy. Compression stockings may help  Drink plenty of water and stay active   Make sure you are eating frequent small meals, nuts are a wonderful snack to keep with you            Return in about 1 week (around 3/27/2025) for Routine OB visit.      We have gone over prenatal care to include the timing and content of visits. I informed her how to contact the office and/or on call person in the event of any problems and encouraged her to do so when she feels it is necessary.  We then spent time answering her questions which she indicated were answered to her satisfaction.    Roselia Dutton DO  3/20/2025 14:38 EDT

## 2025-03-20 ENCOUNTER — ROUTINE PRENATAL (OUTPATIENT)
Dept: OBSTETRICS AND GYNECOLOGY | Facility: CLINIC | Age: 30
End: 2025-03-20
Payer: COMMERCIAL

## 2025-03-20 VITALS — SYSTOLIC BLOOD PRESSURE: 111 MMHG | BODY MASS INDEX: 46.52 KG/M2 | DIASTOLIC BLOOD PRESSURE: 77 MMHG | WEIGHT: 293 LBS

## 2025-03-20 DIAGNOSIS — O36.63X0 EXCESSIVE FETAL GROWTH AFFECTING MANAGEMENT OF PREGNANCY IN THIRD TRIMESTER, SINGLE OR UNSPECIFIED FETUS: ICD-10-CM

## 2025-03-20 DIAGNOSIS — Z34.00 SUPERVISION OF NORMAL FIRST PREGNANCY, ANTEPARTUM: Primary | ICD-10-CM

## 2025-03-20 DIAGNOSIS — O99.210 OBESITY AFFECTING PREGNANCY, ANTEPARTUM, UNSPECIFIED OBESITY TYPE: ICD-10-CM

## 2025-03-20 LAB
GLUCOSE UR STRIP-MCNC: NEGATIVE MG/DL
PROT UR STRIP-MCNC: NEGATIVE MG/DL

## 2025-03-22 ENCOUNTER — HOSPITAL ENCOUNTER (OUTPATIENT)
Facility: HOSPITAL | Age: 30
Setting detail: SURGERY ADMIT
Discharge: HOME | End: 2025-03-22
Attending: OBSTETRICS & GYNECOLOGY | Admitting: OBSTETRICS & GYNECOLOGY
Payer: COMMERCIAL

## 2025-03-22 ENCOUNTER — HOSPITAL ENCOUNTER (OUTPATIENT)
Dept: LABOR AND DELIVERY | Facility: HOSPITAL | Age: 30
Discharge: HOME | End: 2025-03-22
Payer: COMMERCIAL

## 2025-03-22 VITALS — OXYGEN SATURATION: 96 % | SYSTOLIC BLOOD PRESSURE: 128 MMHG | DIASTOLIC BLOOD PRESSURE: 78 MMHG | HEART RATE: 107 BPM

## 2025-03-22 LAB
BACTERIA SPEC AEROBE CULT: NORMAL
BACTERIA SPEC AEROBE CULT: NORMAL

## 2025-03-22 PROCEDURE — 59025 FETAL NON-STRESS TEST: CPT | Performed by: OBSTETRICS & GYNECOLOGY

## 2025-03-22 PROCEDURE — 59025 FETAL NON-STRESS TEST: CPT

## 2025-03-22 NOTE — NON STRESS TEST
Obstetrical Non-stress Test Interpretation     Name:  Jessie Schultz  MRN: [id number]    29 y.o. female  at 38w3d    Indication: NST LGA      Fetal Assessment  Fetal Movement: active  Fetal HR Assessment Method: external  Fetal HR (beats/min): 135  Fetal HR Baseline: normal range  Fetal HR Variability: moderate (amplitude range 6 to 25 bpm)  Fetal HR Accelerations: episodic, greater than/equal to 15 bpm, lasting at least 15 seconds  Fetal HR Decelerations: variable  Sinusoidal Pattern Present: absent    /78   Pulse 107   LMP 2024   SpO2 96%     Reason for test: Other (Comment) (NST LGA)  Date of Test: 3/22/2025  Time frame of test: 5336-8211  RN NST Interpretation: Reactive      Marlene Calzada RN  3/22/2025  12:24 EDT

## 2025-03-22 NOTE — NON STRESS TEST
[facility]   OB NST Note    3/22/2025   Name:  Jessie Schultz  MRN: [id number]    Subjective:  29 y.o.  at 38w3d    Indication:  macrosomia    NST:   Baseline: 135  Variability:   Moderate/Normal (amplitude 6-25 bpm)  Accelerations: Present (32 weeks+) 15 x 15 bpm  Decelerations: Brief nonrepetitive variable deceleration  Contractions:  Absent    NST interpretation: reactive    Documented Vitals    25 1200 25 1201   BP:  128/78   Pulse: 99 107   SpO2: 96%          Lab Results (last 24 hours)       ** No results found for the last 24 hours. **           Assessment:  29 y.o.  AT 38w3d  Macrosomia    Plan:   OB Precautions, FKC, Keep scheduled NSTs, Keep office visit      Electronically signed by Vitaliy Modi MD, 25, 1:14 PM EDT.

## 2025-03-25 ENCOUNTER — ROUTINE PRENATAL (OUTPATIENT)
Dept: OBSTETRICS AND GYNECOLOGY | Facility: CLINIC | Age: 30
End: 2025-03-25
Payer: COMMERCIAL

## 2025-03-25 VITALS — BODY MASS INDEX: 46.81 KG/M2 | SYSTOLIC BLOOD PRESSURE: 120 MMHG | DIASTOLIC BLOOD PRESSURE: 86 MMHG | WEIGHT: 293 LBS

## 2025-03-25 DIAGNOSIS — O36.63X0 EXCESSIVE FETAL GROWTH AFFECTING MANAGEMENT OF PREGNANCY IN THIRD TRIMESTER, SINGLE OR UNSPECIFIED FETUS: ICD-10-CM

## 2025-03-25 DIAGNOSIS — Z34.00 SUPERVISION OF NORMAL FIRST PREGNANCY, ANTEPARTUM: Primary | ICD-10-CM

## 2025-03-25 DIAGNOSIS — O99.210 OBESITY AFFECTING PREGNANCY, ANTEPARTUM, UNSPECIFIED OBESITY TYPE: ICD-10-CM

## 2025-03-25 LAB
GLUCOSE UR STRIP-MCNC: NEGATIVE MG/DL
PROT UR STRIP-MCNC: NEGATIVE MG/DL

## 2025-03-25 PROCEDURE — 99213 OFFICE O/P EST LOW 20 MIN: CPT | Performed by: STUDENT IN AN ORGANIZED HEALTH CARE EDUCATION/TRAINING PROGRAM

## 2025-03-25 NOTE — PROGRESS NOTES
Routine Prenatal Visit     Subjective  Jessie Schultz is a 29 y.o.  at 38w6d here for her routine OB visit.   She is taking her prenatal vitamins.Reports no loss of fluid or vaginal bleeding. Patient doing well.     Pregnancy complicated by:     Patient Active Problem List   Diagnosis    Arthritis    Back pain    Bilateral carpal tunnel syndrome    Class 3 severe obesity without serious comorbidity with body mass index (BMI) of 50.0 to 59.9 in adult    ETD (eustachian tube dysfunction)    Insulin resistance    Numbness    Supervision of normal first pregnancy, antepartum    Obesity affecting pregnancy, antepartum    Excessive fetal growth affecting management of pregnancy in third trimester    Abdominal pain in pregnancy, third trimester         OB History    Para Term  AB Living   1        SAB IAB Ectopic Molar Multiple Live Births              # Outcome Date GA Lbr Stevenson/2nd Weight Sex Type Anes PTL Lv   1 Current                    ROS:  General ROS: negative for - chills or fatigue  Genito-Urinary ROS: negative for  change in urinary stream, vaginal discharge     Objective  Physical Exam:   Vitals:    25 1534   BP: 120/86       Uterine Size: pannus  FHT: 110-160 BPM         Assessment/Plan:   Diagnoses and all orders for this visit:    1. Supervision of normal first pregnancy, antepartum (Primary)  -     POC Urinalysis Dipstick    2. Obesity affecting pregnancy, antepartum, unspecified obesity type  -     POC Urinalysis Dipstick    3. Excessive fetal growth affecting management of pregnancy in third trimester, single or unspecified fetus  -     POC Urinalysis Dipstick            Counseling:   OB precautions, leaking, VB, jyoti killian vs PTL/Labor  FKC  Primary ELECTIVE  discussed.  The risks, benefits, alternatives, and side effects were reviewed in detal.  In the absence of maternal or fetal indications, vaginal delivery is safe and is recommended.   hospitalizations  can be longer and incur all surgical risks- anesthesia, bleeding, infection, damage to pelvic/abd organs.  C-sections confer greater complications in subsequent pregnancies NLT miscarriage/implantation problems, placenta accreta spectrum, uterine rupture (and possible associated maternal/fetal morbidity/mortality), and need for hysterectomy.  This risk increases the more C-sections performed.  In subsequent pregnancies, a vaginal delivery after a csection includes risks of uterine rupture. There are short term benefits of primary Csection compared to vaginal attempt and subsequent CS- decreased hemorrhage and/or transfusions, fewer surgical risks and less urinary incontinence.  An uncomplicated  is the safest for both mother and baby.   Suspected MACROSOMIA discussed.  US is approx +/- 1lb. with subsequent fetal growth until delivery.  Risks of  NLT: Maternal risks vaginal lacerations, pelvic organ prolapse, incontinence of urine or stool, hemorrhage, blood transfusion, .  Infant risks NLT shoulder dystocia, bone fracture, permanent nerve damage limiting lifelong mobility, lack of oxygen including permanent disability, rarely death.  Questions answered.    Round Ligament Pain:  The uterus has several ligaments which provide support and keep the uterus in place. As the  uterus grows these ligaments are pulled and stretched which often causes sharp stabbing like pain in the inguinal area.   You may find a pregnancy support band helpful. Changing positions may also help. Yoga is a great way to cope with round ligament and low back pain in pregnancy.    Massage may also help with low back pain   Things to Consider at this Point in your Pregnancy:  Some women experience swelling in their feet during pregnancy. Compression stockings may help  Drink plenty of water and stay active   Make sure you are eating frequent small meals, nuts are a wonderful snack to keep with you            Return in about 2 weeks  (around 4/8/2025) for Postpartum, incision check.      We have gone over prenatal care to include the timing and content of visits. I informed her how to contact the office and/or on call person in the event of any problems and encouraged her to do so when she feels it is necessary.  We then spent time answering her questions which she indicated were answered to her satisfaction.    Roselia Dutton,   3/25/2025 17:36 EDT

## 2025-03-27 ENCOUNTER — ANESTHESIA EVENT (OUTPATIENT)
Dept: LABOR AND DELIVERY | Facility: HOSPITAL | Age: 30
End: 2025-03-27
Payer: COMMERCIAL

## 2025-03-28 ENCOUNTER — HOSPITAL ENCOUNTER (INPATIENT)
Facility: HOSPITAL | Age: 30
LOS: 2 days | Discharge: HOME | End: 2025-03-30
Attending: STUDENT IN AN ORGANIZED HEALTH CARE EDUCATION/TRAINING PROGRAM | Admitting: STUDENT IN AN ORGANIZED HEALTH CARE EDUCATION/TRAINING PROGRAM
Payer: COMMERCIAL

## 2025-03-28 ENCOUNTER — ANESTHESIA (OUTPATIENT)
Dept: LABOR AND DELIVERY | Facility: HOSPITAL | Age: 30
End: 2025-03-28
Payer: COMMERCIAL

## 2025-03-28 DIAGNOSIS — Z98.891 S/P CESAREAN SECTION: Primary | ICD-10-CM

## 2025-03-28 DIAGNOSIS — O36.63X0 EXCESSIVE FETAL GROWTH AFFECTING MANAGEMENT OF PREGNANCY IN THIRD TRIMESTER, SINGLE OR UNSPECIFIED FETUS: ICD-10-CM

## 2025-03-28 PROBLEM — Z34.90 NORMAL PREGNANCY: Status: ACTIVE | Noted: 2025-03-28

## 2025-03-28 LAB
ABO GROUP BLD: NORMAL
AMPHET+METHAMPHET UR QL: NEGATIVE
AMPHETAMINES UR QL: NEGATIVE
ATMOSPHERIC PRESS: 743.2 MMHG
ATMOSPHERIC PRESS: 745.5 MMHG
BARBITURATES UR QL SCN: NEGATIVE
BASE EXCESS BLDCOA CALC-SCNC: -2.5 MMOL/L (ref -2–2)
BASE EXCESS BLDCOV CALC-SCNC: -1.5 MMOL/L (ref -30–30)
BENZODIAZ UR QL SCN: NEGATIVE
BLD GP AB SCN SERPL QL: NEGATIVE
BUPRENORPHINE SERPL-MCNC: NEGATIVE NG/ML
CANNABINOIDS SERPL QL: NEGATIVE
COCAINE UR QL: NEGATIVE
DEPRECATED RDW RBC AUTO: 41.9 FL (ref 37–54)
ERYTHROCYTE [DISTWIDTH] IN BLOOD BY AUTOMATED COUNT: 13.6 % (ref 12.3–15.4)
FENTANYL UR-MCNC: NEGATIVE NG/ML
HCO3 BLDCOA-SCNC: 26.7 MMOL/L
HCO3 BLDCOV-SCNC: 24.7 MMOL/L
HCT VFR BLD AUTO: 36.4 % (ref 34–46.6)
HGB BLD-MCNC: 12.6 G/DL (ref 12–15.9)
MCH RBC QN AUTO: 29.4 PG (ref 26.6–33)
MCHC RBC AUTO-ENTMCNC: 34.6 G/DL (ref 31.5–35.7)
MCV RBC AUTO: 84.8 FL (ref 79–97)
METHADONE UR QL SCN: NEGATIVE
OPIATES UR QL: NEGATIVE
OXYCODONE UR QL SCN: NEGATIVE
PCO2 BLDCOA: 61.3 MMHG (ref 33–49)
PCO2 BLDCOV: 45.5 MM HG (ref 35–51.3)
PCP UR QL SCN: NEGATIVE
PH BLDCOA: 7.25 PH UNITS (ref 7.18–7.34)
PH BLDCOV: 7.34 PH UNITS (ref 7.26–7.4)
PLATELET # BLD AUTO: 285 10*3/MM3 (ref 140–450)
PMV BLD AUTO: 9.4 FL (ref 6–12)
PO2 BLDCOA: 16.4 MMHG
PO2 BLDCOV: 25.2 MM HG (ref 19–39)
RBC # BLD AUTO: 4.29 10*6/MM3 (ref 3.77–5.28)
RH BLD: POSITIVE
SAO2 % BLDCOA: 16.7 %
SAO2 % BLDCOV: 41.9 %
T&S EXPIRATION DATE: NORMAL
TREPONEMA PALLIDUM IGG+IGM AB [PRESENCE] IN SERUM OR PLASMA BY IMMUNOASSAY: NORMAL
TRICYCLICS UR QL SCN: NEGATIVE
WBC NRBC COR # BLD AUTO: 12.37 10*3/MM3 (ref 3.4–10.8)

## 2025-03-28 PROCEDURE — 25010000002 CEFAZOLIN 3 G RECONSTITUTED SOLUTION 1 EACH VIAL: Performed by: STUDENT IN AN ORGANIZED HEALTH CARE EDUCATION/TRAINING PROGRAM

## 2025-03-28 PROCEDURE — 59514 CESAREAN DELIVERY ONLY: CPT | Performed by: STUDENT IN AN ORGANIZED HEALTH CARE EDUCATION/TRAINING PROGRAM

## 2025-03-28 PROCEDURE — 86780 TREPONEMA PALLIDUM: CPT | Performed by: STUDENT IN AN ORGANIZED HEALTH CARE EDUCATION/TRAINING PROGRAM

## 2025-03-28 PROCEDURE — 80307 DRUG TEST PRSMV CHEM ANLYZR: CPT | Performed by: STUDENT IN AN ORGANIZED HEALTH CARE EDUCATION/TRAINING PROGRAM

## 2025-03-28 PROCEDURE — 25010000002 OXYTOCIN PER 10 UNITS: Performed by: NURSE ANESTHETIST, CERTIFIED REGISTERED

## 2025-03-28 PROCEDURE — 85027 COMPLETE CBC AUTOMATED: CPT | Performed by: STUDENT IN AN ORGANIZED HEALTH CARE EDUCATION/TRAINING PROGRAM

## 2025-03-28 PROCEDURE — 82803 BLOOD GASES ANY COMBINATION: CPT

## 2025-03-28 PROCEDURE — 25010000002 ONDANSETRON PER 1 MG: Performed by: STUDENT IN AN ORGANIZED HEALTH CARE EDUCATION/TRAINING PROGRAM

## 2025-03-28 PROCEDURE — 86900 BLOOD TYPING SEROLOGIC ABO: CPT | Performed by: STUDENT IN AN ORGANIZED HEALTH CARE EDUCATION/TRAINING PROGRAM

## 2025-03-28 PROCEDURE — 86901 BLOOD TYPING SEROLOGIC RH(D): CPT | Performed by: STUDENT IN AN ORGANIZED HEALTH CARE EDUCATION/TRAINING PROGRAM

## 2025-03-28 PROCEDURE — 25810000003 LACTATED RINGERS PER 1000 ML: Performed by: STUDENT IN AN ORGANIZED HEALTH CARE EDUCATION/TRAINING PROGRAM

## 2025-03-28 PROCEDURE — 25010000002 KETOROLAC TROMETHAMINE PER 15 MG: Performed by: STUDENT IN AN ORGANIZED HEALTH CARE EDUCATION/TRAINING PROGRAM

## 2025-03-28 PROCEDURE — 25810000003 SODIUM CHLORIDE 0.9 % SOLUTION: Performed by: STUDENT IN AN ORGANIZED HEALTH CARE EDUCATION/TRAINING PROGRAM

## 2025-03-28 PROCEDURE — 25010000002 OXYTOCIN PER 10 UNITS: Performed by: STUDENT IN AN ORGANIZED HEALTH CARE EDUCATION/TRAINING PROGRAM

## 2025-03-28 PROCEDURE — 86850 RBC ANTIBODY SCREEN: CPT | Performed by: STUDENT IN AN ORGANIZED HEALTH CARE EDUCATION/TRAINING PROGRAM

## 2025-03-28 PROCEDURE — 25010000002 MORPHINE PER 10 MG: Performed by: NURSE ANESTHETIST, CERTIFIED REGISTERED

## 2025-03-28 PROCEDURE — 25010000002 BUPIVACAINE PF 0.75 % SOLUTION: Performed by: NURSE ANESTHETIST, CERTIFIED REGISTERED

## 2025-03-28 DEVICE — SEAL HEMO SURG ARISTA/AH ABS/PWDR 3GM: Type: IMPLANTABLE DEVICE | Site: UTERUS | Status: FUNCTIONAL

## 2025-03-28 RX ORDER — SODIUM CHLORIDE 9 MG/ML
40 INJECTION, SOLUTION INTRAVENOUS AS NEEDED
Status: DISCONTINUED | OUTPATIENT
Start: 2025-03-28 | End: 2025-03-28 | Stop reason: HOSPADM

## 2025-03-28 RX ORDER — LIDOCAINE HYDROCHLORIDE 10 MG/ML
0.5 INJECTION, SOLUTION EPIDURAL; INFILTRATION; INTRACAUDAL; PERINEURAL ONCE AS NEEDED
Status: DISCONTINUED | OUTPATIENT
Start: 2025-03-28 | End: 2025-03-28 | Stop reason: HOSPADM

## 2025-03-28 RX ORDER — ACETAMINOPHEN 500 MG
1000 TABLET ORAL ONCE
Status: COMPLETED | OUTPATIENT
Start: 2025-03-28 | End: 2025-03-28

## 2025-03-28 RX ORDER — CALCIUM CARBONATE 500 MG/1
1 TABLET, CHEWABLE ORAL EVERY 4 HOURS PRN
Status: DISCONTINUED | OUTPATIENT
Start: 2025-03-28 | End: 2025-03-30 | Stop reason: HOSPADM

## 2025-03-28 RX ORDER — ACETAMINOPHEN 500 MG
1000 TABLET ORAL EVERY 6 HOURS
Status: DISPENSED | OUTPATIENT
Start: 2025-03-28 | End: 2025-03-29

## 2025-03-28 RX ORDER — HYDROMORPHONE HYDROCHLORIDE 2 MG/ML
0.25 INJECTION, SOLUTION INTRAMUSCULAR; INTRAVENOUS; SUBCUTANEOUS
Status: DISCONTINUED | OUTPATIENT
Start: 2025-03-28 | End: 2025-03-28 | Stop reason: HOSPADM

## 2025-03-28 RX ORDER — DIPHENHYDRAMINE HCL 25 MG
25 CAPSULE ORAL EVERY 4 HOURS PRN
Status: DISCONTINUED | OUTPATIENT
Start: 2025-03-28 | End: 2025-03-30 | Stop reason: HOSPADM

## 2025-03-28 RX ORDER — DIPHENHYDRAMINE HYDROCHLORIDE 50 MG/ML
12.5 INJECTION, SOLUTION INTRAMUSCULAR; INTRAVENOUS EVERY 6 HOURS PRN
Status: ACTIVE | OUTPATIENT
Start: 2025-03-28 | End: 2025-03-29

## 2025-03-28 RX ORDER — ONDANSETRON 4 MG/1
4 TABLET, ORALLY DISINTEGRATING ORAL EVERY 6 HOURS PRN
Status: DISCONTINUED | OUTPATIENT
Start: 2025-03-28 | End: 2025-03-28 | Stop reason: HOSPADM

## 2025-03-28 RX ORDER — HYDROMORPHONE HYDROCHLORIDE 2 MG/ML
0.5 INJECTION, SOLUTION INTRAMUSCULAR; INTRAVENOUS; SUBCUTANEOUS
Status: DISCONTINUED | OUTPATIENT
Start: 2025-03-28 | End: 2025-03-28 | Stop reason: HOSPADM

## 2025-03-28 RX ORDER — SODIUM CHLORIDE 0.9 % (FLUSH) 0.9 %
10 SYRINGE (ML) INJECTION EVERY 12 HOURS SCHEDULED
Status: DISCONTINUED | OUTPATIENT
Start: 2025-03-28 | End: 2025-03-28 | Stop reason: HOSPADM

## 2025-03-28 RX ORDER — ONDANSETRON 4 MG/1
4 TABLET, ORALLY DISINTEGRATING ORAL EVERY 6 HOURS PRN
Status: DISCONTINUED | OUTPATIENT
Start: 2025-03-28 | End: 2025-03-30 | Stop reason: HOSPADM

## 2025-03-28 RX ORDER — SIMETHICONE 80 MG
80 TABLET,CHEWABLE ORAL 4 TIMES DAILY PRN
Status: DISCONTINUED | OUTPATIENT
Start: 2025-03-28 | End: 2025-03-30 | Stop reason: HOSPADM

## 2025-03-28 RX ORDER — BUPIVACAINE HYDROCHLORIDE 7.5 MG/ML
INJECTION, SOLUTION EPIDURAL; RETROBULBAR
Status: COMPLETED | OUTPATIENT
Start: 2025-03-28 | End: 2025-03-28

## 2025-03-28 RX ORDER — OXYTOCIN/0.9 % SODIUM CHLORIDE 30/500 ML
999 PLASTIC BAG, INJECTION (ML) INTRAVENOUS ONCE
Status: DISCONTINUED | OUTPATIENT
Start: 2025-03-28 | End: 2025-03-28 | Stop reason: HOSPADM

## 2025-03-28 RX ORDER — CITRIC ACID/SODIUM CITRATE 334-500MG
30 SOLUTION, ORAL ORAL ONCE
Status: DISCONTINUED | OUTPATIENT
Start: 2025-03-28 | End: 2025-03-28 | Stop reason: HOSPADM

## 2025-03-28 RX ORDER — FAMOTIDINE 20 MG/1
20 TABLET, FILM COATED ORAL ONCE AS NEEDED
Status: DISCONTINUED | OUTPATIENT
Start: 2025-03-28 | End: 2025-03-28 | Stop reason: HOSPADM

## 2025-03-28 RX ORDER — MISOPROSTOL 200 UG/1
TABLET ORAL
Status: DISPENSED
Start: 2025-03-28 | End: 2025-03-28

## 2025-03-28 RX ORDER — FAMOTIDINE 10 MG/ML
20 INJECTION, SOLUTION INTRAVENOUS ONCE AS NEEDED
Status: DISCONTINUED | OUTPATIENT
Start: 2025-03-28 | End: 2025-03-28 | Stop reason: HOSPADM

## 2025-03-28 RX ORDER — SODIUM CHLORIDE, SODIUM LACTATE, POTASSIUM CHLORIDE, CALCIUM CHLORIDE 600; 310; 30; 20 MG/100ML; MG/100ML; MG/100ML; MG/100ML
150 INJECTION, SOLUTION INTRAVENOUS CONTINUOUS
Status: DISCONTINUED | OUTPATIENT
Start: 2025-03-28 | End: 2025-03-28

## 2025-03-28 RX ORDER — FAMOTIDINE 10 MG/ML
20 INJECTION, SOLUTION INTRAVENOUS ONCE AS NEEDED
Status: COMPLETED | OUTPATIENT
Start: 2025-03-28 | End: 2025-03-28

## 2025-03-28 RX ORDER — MORPHINE SULFATE 0.5 MG/ML
2 INJECTION, SOLUTION EPIDURAL; INTRATHECAL; INTRAVENOUS EVERY 4 HOURS PRN
Status: DISCONTINUED | OUTPATIENT
Start: 2025-03-28 | End: 2025-03-30 | Stop reason: HOSPADM

## 2025-03-28 RX ORDER — ACETAMINOPHEN 325 MG/1
650 TABLET ORAL ONCE AS NEEDED
Status: DISCONTINUED | OUTPATIENT
Start: 2025-03-28 | End: 2025-03-28 | Stop reason: HOSPADM

## 2025-03-28 RX ORDER — OXYCODONE HYDROCHLORIDE 5 MG/1
5 TABLET ORAL EVERY 4 HOURS PRN
Status: DISCONTINUED | OUTPATIENT
Start: 2025-03-28 | End: 2025-03-30 | Stop reason: HOSPADM

## 2025-03-28 RX ORDER — OXYCODONE HYDROCHLORIDE 5 MG/1
10 TABLET ORAL EVERY 4 HOURS PRN
Status: DISCONTINUED | OUTPATIENT
Start: 2025-03-28 | End: 2025-03-30 | Stop reason: HOSPADM

## 2025-03-28 RX ORDER — OXYTOCIN/0.9 % SODIUM CHLORIDE 30/500 ML
250 PLASTIC BAG, INJECTION (ML) INTRAVENOUS CONTINUOUS
Status: DISPENSED | OUTPATIENT
Start: 2025-03-28 | End: 2025-03-28

## 2025-03-28 RX ORDER — ONDANSETRON 2 MG/ML
4 INJECTION INTRAMUSCULAR; INTRAVENOUS EVERY 6 HOURS PRN
Status: DISCONTINUED | OUTPATIENT
Start: 2025-03-28 | End: 2025-03-30 | Stop reason: HOSPADM

## 2025-03-28 RX ORDER — NALOXONE HCL 0.4 MG/ML
0.4 VIAL (ML) INJECTION ONCE AS NEEDED
Status: ACTIVE | OUTPATIENT
Start: 2025-03-28 | End: 2025-03-29

## 2025-03-28 RX ORDER — SODIUM CHLORIDE 0.9 % (FLUSH) 0.9 %
10 SYRINGE (ML) INJECTION AS NEEDED
Status: DISCONTINUED | OUTPATIENT
Start: 2025-03-28 | End: 2025-03-28 | Stop reason: HOSPADM

## 2025-03-28 RX ORDER — DIPHENHYDRAMINE HCL 25 MG
25 CAPSULE ORAL EVERY 6 HOURS PRN
Status: ACTIVE | OUTPATIENT
Start: 2025-03-28 | End: 2025-03-29

## 2025-03-28 RX ORDER — MORPHINE SULFATE 0.5 MG/ML
INJECTION, SOLUTION EPIDURAL; INTRATHECAL; INTRAVENOUS
Status: COMPLETED | OUTPATIENT
Start: 2025-03-28 | End: 2025-03-28

## 2025-03-28 RX ORDER — HYDROCORTISONE 25 MG/G
CREAM TOPICAL 3 TIMES DAILY PRN
Status: DISCONTINUED | OUTPATIENT
Start: 2025-03-28 | End: 2025-03-30 | Stop reason: HOSPADM

## 2025-03-28 RX ORDER — OXYTOCIN 10 [USP'U]/ML
INJECTION, SOLUTION INTRAMUSCULAR; INTRAVENOUS AS NEEDED
Status: DISCONTINUED | OUTPATIENT
Start: 2025-03-28 | End: 2025-03-28 | Stop reason: SURG

## 2025-03-28 RX ORDER — ACETAMINOPHEN 325 MG/1
650 TABLET ORAL EVERY 6 HOURS
Status: DISCONTINUED | OUTPATIENT
Start: 2025-03-29 | End: 2025-03-30 | Stop reason: HOSPADM

## 2025-03-28 RX ORDER — ENOXAPARIN SODIUM 100 MG/ML
40 INJECTION SUBCUTANEOUS EVERY 12 HOURS
Status: DISCONTINUED | OUTPATIENT
Start: 2025-03-29 | End: 2025-03-30 | Stop reason: HOSPADM

## 2025-03-28 RX ORDER — KETOROLAC TROMETHAMINE 30 MG/ML
15 INJECTION, SOLUTION INTRAMUSCULAR; INTRAVENOUS EVERY 6 HOURS
Status: DISCONTINUED | OUTPATIENT
Start: 2025-03-28 | End: 2025-03-28

## 2025-03-28 RX ORDER — KETOROLAC TROMETHAMINE 30 MG/ML
15 INJECTION, SOLUTION INTRAMUSCULAR; INTRAVENOUS EVERY 6 HOURS
Status: COMPLETED | OUTPATIENT
Start: 2025-03-28 | End: 2025-03-29

## 2025-03-28 RX ORDER — OXYTOCIN/0.9 % SODIUM CHLORIDE 30/500 ML
125 PLASTIC BAG, INJECTION (ML) INTRAVENOUS CONTINUOUS PRN
Status: DISCONTINUED | OUTPATIENT
Start: 2025-03-28 | End: 2025-03-30 | Stop reason: HOSPADM

## 2025-03-28 RX ORDER — ALUMINA, MAGNESIA, AND SIMETHICONE 2400; 2400; 240 MG/30ML; MG/30ML; MG/30ML
15 SUSPENSION ORAL EVERY 4 HOURS PRN
Status: DISCONTINUED | OUTPATIENT
Start: 2025-03-28 | End: 2025-03-30 | Stop reason: HOSPADM

## 2025-03-28 RX ORDER — IBUPROFEN 800 MG/1
800 TABLET, FILM COATED ORAL EVERY 8 HOURS SCHEDULED
Status: DISCONTINUED | OUTPATIENT
Start: 2025-03-29 | End: 2025-03-30 | Stop reason: HOSPADM

## 2025-03-28 RX ORDER — NALOXONE HCL 0.4 MG/ML
0.4 VIAL (ML) INJECTION
Status: DISCONTINUED | OUTPATIENT
Start: 2025-03-28 | End: 2025-03-30 | Stop reason: HOSPADM

## 2025-03-28 RX ORDER — IBUPROFEN 800 MG/1
800 TABLET, FILM COATED ORAL EVERY 8 HOURS SCHEDULED
Status: DISCONTINUED | OUTPATIENT
Start: 2025-03-29 | End: 2025-03-28

## 2025-03-28 RX ORDER — ONDANSETRON 2 MG/ML
4 INJECTION INTRAMUSCULAR; INTRAVENOUS EVERY 6 HOURS PRN
Status: DISCONTINUED | OUTPATIENT
Start: 2025-03-28 | End: 2025-03-28 | Stop reason: HOSPADM

## 2025-03-28 RX ORDER — AMOXICILLIN 250 MG
1 CAPSULE ORAL 2 TIMES DAILY
Status: DISCONTINUED | OUTPATIENT
Start: 2025-03-28 | End: 2025-03-30 | Stop reason: HOSPADM

## 2025-03-28 RX ORDER — EPHEDRINE SULFATE 50 MG/ML
INJECTION INTRAVENOUS AS NEEDED
Status: DISCONTINUED | OUTPATIENT
Start: 2025-03-28 | End: 2025-03-28 | Stop reason: SURG

## 2025-03-28 RX ORDER — BUPIVACAINE HCL/0.9 % NACL/PF 0.125 %
PLASTIC BAG, INJECTION (ML) EPIDURAL AS NEEDED
Status: DISCONTINUED | OUTPATIENT
Start: 2025-03-28 | End: 2025-03-28 | Stop reason: SURG

## 2025-03-28 RX ORDER — KETOROLAC TROMETHAMINE 30 MG/ML
30 INJECTION, SOLUTION INTRAMUSCULAR; INTRAVENOUS ONCE
Status: COMPLETED | OUTPATIENT
Start: 2025-03-28 | End: 2025-03-28

## 2025-03-28 RX ADMIN — SENNOSIDES AND DOCUSATE SODIUM 1 TABLET: 50; 8.6 TABLET ORAL at 22:24

## 2025-03-28 RX ADMIN — OXYTOCIN 40 UNITS: 10 INJECTION, SOLUTION INTRAMUSCULAR; INTRAVENOUS at 13:24

## 2025-03-28 RX ADMIN — SODIUM CHLORIDE, POTASSIUM CHLORIDE, SODIUM LACTATE AND CALCIUM CHLORIDE: 600; 310; 30; 20 INJECTION, SOLUTION INTRAVENOUS at 12:30

## 2025-03-28 RX ADMIN — ACETAMINOPHEN 1000 MG: 500 TABLET ORAL at 12:39

## 2025-03-28 RX ADMIN — BUPIVACAINE HYDROCHLORIDE 1.9 ML: 7.5 INJECTION, SOLUTION EPIDURAL; RETROBULBAR at 12:59

## 2025-03-28 RX ADMIN — OXYTOCIN 250 ML/HR: 10 INJECTION, SOLUTION INTRAMUSCULAR; INTRAVENOUS at 15:00

## 2025-03-28 RX ADMIN — EPHEDRINE SULFATE 15 MG: 50 INJECTION INTRAVENOUS at 13:05

## 2025-03-28 RX ADMIN — FAMOTIDINE 20 MG: 10 INJECTION INTRAVENOUS at 12:39

## 2025-03-28 RX ADMIN — MORPHINE SULFATE 0.15 MG: 0.5 INJECTION, SOLUTION EPIDURAL; INTRATHECAL; INTRAVENOUS at 12:59

## 2025-03-28 RX ADMIN — KETOROLAC TROMETHAMINE 30 MG: 30 INJECTION, SOLUTION INTRAMUSCULAR; INTRAVENOUS at 14:40

## 2025-03-28 RX ADMIN — KETOROLAC TROMETHAMINE 15 MG: 30 INJECTION, SOLUTION INTRAMUSCULAR; INTRAVENOUS at 22:26

## 2025-03-28 RX ADMIN — SODIUM CHLORIDE, POTASSIUM CHLORIDE, SODIUM LACTATE AND CALCIUM CHLORIDE: 600; 310; 30; 20 INJECTION, SOLUTION INTRAVENOUS at 13:23

## 2025-03-28 RX ADMIN — Medication 200 MCG: at 13:06

## 2025-03-28 RX ADMIN — SODIUM CHLORIDE 3 G: 9 INJECTION, SOLUTION INTRAVENOUS at 12:40

## 2025-03-28 RX ADMIN — ONDANSETRON 4 MG: 2 INJECTION INTRAMUSCULAR; INTRAVENOUS at 18:08

## 2025-03-28 RX ADMIN — EPHEDRINE SULFATE 15 MG: 50 INJECTION INTRAVENOUS at 13:08

## 2025-03-28 NOTE — H&P
[facility]  Obstetric History and Physical    Chief Complaint   Patient presents with    Scheduled        Subjective     Patient is a 29 y.o. female  currently at 39w2d, who presents for scheduled primary  section due to maternal request for suspected macrosomia.    Her prenatal care is complicated by  abnormal fetal growth  macrosomia.  Her previous obstetric/gynecological history is noted for is non-contributory.    The following portions of the patients history were reviewed and updated as appropriate: current medications, allergies, past medical history, past surgical history, past family history, past social history, and problem list .       Prenatal Information:  Prenatal Results       Initial Prenatal Labs       Test Value Reference Range Date Time    Hemoglobin  11.8 g/dL 12.0 - 15.9 10/19/24 1556       12.7 g/dL 12.0 - 15.9 24 1005       12.7 g/dL 12.0 - 15.9 24 0915    Hematocrit  34.7 % 34.0 - 46.6 10/19/24 1556       38.6 % 34.0 - 46.6 24 1005       36.9 % 34.0 - 46.6 24 0915    Platelets  241 10*3/mm3 140 - 450 10/19/24 1556       262 10*3/mm3 140 - 450 24 1005       248 10*3/mm3 140 - 450 24 0915    Rubella IgG  1.79 index Immune >0.99 24 1005    Hepatitis B SAg  Non-Reactive  Non-Reactive 24 1005    Hepatitis C Ab  Non-Reactive  Non-Reactive 24 1005       Non-Reactive  Non-Reactive 24 0915    RPR  Non-Reactive  Non-Reactive 24 1005    T. Pallidum Ab         ABO  A   24 1005    Rh  Positive   24 1005    Antibody Screen  Negative   24 1005    HIV  Non-Reactive  Non-Reactive 24 1005    Urine Culture  50,000 CFU/mL Normal Urogenital Vania   24 0752       >100,000 CFU/mL Mixed Vania Isolated   24 1005    Gonorrhea  Negative  Negative 24 1005    Chlamydia  Negative  Negative 24 1005    TSH  1.160 uIU/mL 0.270 - 4.200 24 0915    HgB A1c   5.00 % 4.80 - 5.60 24 1009     Varicella IgG        Hemoglobinopathy Fractionation  Comment   09/20/24 1005    Hemoglobinopathy (genetic testing)        Cystic fibrosis         Spinal muscular atrophy        Fragile X                  Fetal testing        Test Value Reference Range Date Time    NIPT        MSAFP        AFP-4                  2nd and 3rd Trimester       Test Value Reference Range Date Time    Hemoglobin (repeated)  11.4 g/dL 12.0 - 15.9 03/18/25 0553       12.3 g/dL 12.0 - 15.9 03/17/25 0720       11.6 g/dL 12.0 - 15.9 12/26/24 0733       12.3 g/dL 12.0 - 15.9 12/11/24 1003    Hematocrit (repeated)  33.7 % 34.0 - 46.6 03/18/25 0553       37.3 % 34.0 - 46.6 03/17/25 0720       34.5 % 34.0 - 46.6 12/26/24 0733       35.9 % 34.0 - 46.6 12/11/24 1003    Platelets   230 10*3/mm3 140 - 450 03/18/25 0553       263 10*3/mm3 140 - 450 03/17/25 0720       225 10*3/mm3 140 - 450 12/26/24 0733       259 10*3/mm3 140 - 450 12/11/24 1003       241 10*3/mm3 140 - 450 10/19/24 1556       262 10*3/mm3 140 - 450 09/20/24 1005       248 10*3/mm3 140 - 450 09/11/24 0915    1 hour GTT   103 mg/dL 65 - 139 12/11/24 1003       106 mg/dL 65 - 139 10/23/24 1004    Antibody Screen (repeated)        3rd TM syphilis scrn (repeated)  RPR         3rd TM syphilis scrn (repeated) TP-Ab        3rd TM syphilis screen TB-Ab (FTA)        Syphilis cascade test TP-Ab (EIA)        Syphilis cascade TPPA        GTT Fasting        GTT 1 Hr        GTT 2 Hr        GTT 3 Hr        Group B Strep  Negative  Negative 03/05/25 1430              Other testing        Test Value Reference Range Date Time    Parvo IgG         CMV IgG                   Drug Screening       Test Value Reference Range Date Time    Amphetamine Screen        Barbiturate Screen  Negative  Negative 09/20/24 1005    Benzodiazepine Screen  Negative  Negative 09/20/24 1005    Methadone Screen  Negative  Negative 09/20/24 1005    Phencyclidine Screen        Opiates Screen  Negative  Negative 09/20/24 1005     THC Screen  Negative  Negative 09/20/24 1005    Cocaine Screen  Negative  Negative 09/20/24 1005    Propoxyphene Screen        Buprenorphine Screen        Methamphetamine Screen        Oxycodone Screen  Negative  Negative 09/20/24 1005    Tricyclic Antidepressants Screen                  Legend    ^: Historical                          External Prenatal Results       Pregnancy Outside Results - Transcribed From Office Records - See Scanned Records For Details       Test Value Date Time    ABO  A  09/20/24 1005    Rh  Positive  09/20/24 1005    Antibody Screen  Negative  09/20/24 1005    Varicella IgG       Rubella  1.79 index 09/20/24 1005    Hgb  11.4 g/dL 03/18/25 0553       12.3 g/dL 03/17/25 0720       11.6 g/dL 12/26/24 0733       12.3 g/dL 12/11/24 1003       11.8 g/dL 10/19/24 1556       12.7 g/dL 09/20/24 1005       12.7 g/dL 09/11/24 0915    Hct  33.7 % 03/18/25 0553       37.3 % 03/17/25 0720       34.5 % 12/26/24 0733       35.9 % 12/11/24 1003       34.7 % 10/19/24 1556       38.6 % 09/20/24 1005       36.9 % 09/11/24 0915    HgB A1c   5.00 % 09/20/24 1005    1h GTT  103 mg/dL 12/11/24 1003       106 mg/dL 10/23/24 1004    3h GTT Fasting       3h GTT 1 hour       3h GTT 2 hour       3h GTT 3 hour        Gonorrhea (discrete)  Negative  09/20/24 1005    Chlamydia (discrete)  Negative  09/20/24 1005    RPR  Non-Reactive  09/20/24 1005    Syphils cascade: TP-Ab (FTA)       TP-Ab       TP-Ab (EIA)       TPPA       HBsAg  Non-Reactive  09/20/24 1005    Herpes Simplex Virus PCR       Herpes Simplex VIrus Culture       HIV  Non-Reactive  09/20/24 1005    Hep C RNA Quant PCR       Hep C Antibody  Non-Reactive  09/20/24 1005       Non-Reactive  09/11/24 0915    AFP       NIPT       Cystic Fibrosis (Queta)       Cystic Fibroisis        Spinal Muscular atrophy       Fragile X       Group B Strep  Negative  03/05/25 1430    GBS Susceptibility to Clindamycin       GBS Susceptibility to Erythromycin       Fetal  Fibronectin       Genetic Testing, Maternal Blood                 Drug Screening       Test Value Date Time    Urine Drug Screen       Amphetamine Screen       Barbiturate Screen  Negative  24 1005    Benzodiazepine Screen  Negative  24 1005    Methadone Screen  Negative  24 1005    Phencyclidine Screen       Opiates Screen  Negative  24 1005    THC Screen  Negative  24 1005    Cocaine Screen       Propoxyphene Screen       Buprenorphine Screen       Methamphetamine Screen       Oxycodone Screen  Negative  24 1005    Tricyclic Antidepressants Screen                 Legend    ^: Historical                             Past OB History:     OB History    Para Term  AB Living   1 0 0 0 0 0   SAB IAB Ectopic Molar Multiple Live Births   0 0 0 0 0 0      # Outcome Date GA Lbr Stevenson/2nd Weight Sex Type Anes PTL Lv   1 Current                Past Medical History: Past Medical History:   Diagnosis Date    Allergies     Ankle fracture, left 2024    Carpal tunnel syndrome     LEFT    Hyperlipidemia     Prediabetes       Past Surgical History Past Surgical History:   Procedure Laterality Date    ADENOIDECTOMY      CARPAL TUNNEL RELEASE Right     CARPAL TUNNEL RELEASE Left 2023    Procedure: CARPAL TUNNEL RELEASE;  Surgeon: Bill Jones MD;  Location: [facility] [place];  Service: Orthopedics;  Laterality: Left;    TONSILLECTOMY  2012    TYMPANOSTOMY TUBE PLACEMENT      x2      Family History: Family History   Problem Relation Age of Onset    Hypertension Father     Diabetes Father     Hypertension Mother     Diabetes Mother     Hypertension Brother     Diabetes Paternal Grandfather     Diabetes Maternal Grandmother     Deep vein thrombosis Other     Colon cancer Other     Malig Hyperthermia Neg Hx     Miscarriages / Stillbirths Neg Hx     Mental retardation Neg Hx     Mental illness Neg Hx     Learning disabilities Neg Hx     Kidney disease Neg Hx     Hyperlipidemia  Neg Hx     Heart disease Neg Hx     Hearing loss Neg Hx     Early death Neg Hx     Drug abuse Neg Hx     Depression Neg Hx     COPD Neg Hx     Cancer Neg Hx     Bleeding Disorder Neg Hx     Birth defects Neg Hx     Asthma Neg Hx     Arthritis Neg Hx     Alcohol abuse Neg Hx     Breast cancer Neg Hx     Ovarian cancer Neg Hx     Uterine cancer Neg Hx     Melanoma Neg Hx     Prostate cancer Neg Hx       Social History:  reports that she has never smoked. She has never been exposed to tobacco smoke. She has never used smokeless tobacco.   reports that she does not currently use alcohol.   reports no history of drug use.        General ROS: Pertinent items are noted in HPI    Objective       Vital Signs Range for the last 24 hours  Temperature:     Temp Source:     BP:     Pulse:     Respirations:     SPO2:     O2 Amount (l/min):     O2 Devices     Weight:       Physical Examination: General appearance - alert, well appearing, and in no distress  Mental status - alert, oriented to person, place, and time  Chest - no labored breathing  Abdomen - soft, nontender, nondistended, gravid  Extremities - peripheral pulses normal, no pedal edema        GBS is negative      Assessment & Plan       Normal pregnancy        Assessment:  1.  Intrauterine pregnancy at 39w2d gestation with reactive, reassuring fetal status.    2.  Suspected macrosomia, maternal request primary  section  3.  Obstetrical history significant for is non-contributory.  4.  GBS status:   Group B Strep, DNA   Date Value Ref Range Status   2025 Negative Negative Final       Plan:  1. Primary  scheduled  2. Plan of care has been reviewed with patient and patient agrees.   3.  Risks, benefits of treatment plan have been discussed.  4.  All questions have been answered.    Risks and benefits and alternatives of surgery were discussed with patient.  Risks are not limited to anesthesia, bleeding, blood transfusion, infection, damage to  surrounding organs, wound separation, re-operation, thromboembolic disease, death.        Roselia Dutton DO  3/28/2025  10:47 EDT

## 2025-03-28 NOTE — PLAN OF CARE
Goal Outcome Evaluation:      Bleeding WDL, Uterus is firm and at the U. Pain is controlled. Pt bonding well with baby. Wound vac in place, consult entered

## 2025-03-28 NOTE — PROGRESS NOTES
[facility]    I was requested to assist on this  section by the primary surgeon, Dr. Roselia Dutton DO. The indication for the request was due to the expected complexity of the case, safety of the patient, and potential improved clinical outcome of the case. I was present from the skin incision until closure of the subcutaneous tissue. I provided retraction assistance for the  section for the primary surgeon.  Ludmila Lee MD

## 2025-03-28 NOTE — L&D DELIVERY NOTE
SECTION PRIMARY  Procedure Report    Patient Name:  Jessie Schultz  YOB: 1995    Date of Surgery:  3/28/2025     Indications: Maternal request, suspected microsomia    Pre-op Diagnosis:   39 weeks 2 days gestation  Maternal request  Suspected macrosomia  Obesity       Post-Op Diagnosis Codes:  Same as above  Live female infant    Procedure/CPT® Codes:  No CPT Code Applied in Case Entry    Procedure(s):   SECTION PRIMARY        Staff:  Surgeon(s):  Roselia Dutton DO Crawford, Nellie, MD         Anesthesia: Choice    Estimated Blood Loss: 800cc    Implants:    Nothing was implanted during the procedure    Specimen:          None        Findings: Normal-appearing uterus, bilateral fallopian tubes and ovaries, live female infant    Complications: None    Description of Procedure:   The patient was taken to the operating room where a time out was performed to confirm correct patient and correct procedure. Ancef 3g was administered and spinal anesthesia established. The patient was then placed in supine position with left lateral uterine displacement. A Edgar catheter was inserted into the bladder with return of clear yellow urine. The patient was then prepped and draped in the normal sterile fashion for a low transverse skin incision. An incision was made in the skin with a surgical scalpel. Sharp and blunt dissection was used to dissect over subsequent layers of tissue.  Hemostasis achieved in the subcutaneous tissue with Bovie electrocautery.  The fascia was incised at midline and the fascial incision was extended bilaterally using blunt and sharp dissection with Milton scissors.  The superior edge of the fascia was grasped and dissected off of the rectus muscles using blunt and sharp dissection with Milton scissors.  The inferior edge of the fascia was then grasped and dissected off using blunt and sharp dissection with Milton scissors.  The rectus muscles were then divided at midline. The  peritoneum was identified and bluntly entered at its superior margin taking care to avoid at the bladder.  The Jourdan retractor was inserted.  The bladder blade was inserted.  A bladder flap was formed using Metzenbaum scissors and blunt dissection.  The bladder blade was reinserted.  A transverse incision was made in the lower uterine segment using the scalpel. The uterine incision was extended in a cephalocaudad direction using blunt dissection. The amniotic sac was entered and the amniotic fluid was noted to be clear. The surgeon's hand was placed into the uterine cavity. The fetal head was identified elevated into the abdomen and delivered through the uterine incision with the assistance of fundal pressure. The infant was examined for nuchal cord. No nuchal cord identified. The infant was then delivered with traction and the assistance of fundal pressure. The infant's oral and nasal passages were bulb suction.  Vigorous cry was noted at delivery.  On delivery, the cord was clamped x2 and cut. The infant was then passed off the table to the  team for further care. Cord gases and cord blood were obtained. The placenta delivered intact with manual expression with a three-vessel cord. Oxytocin was administered by IV infusion to enhance uterine contraction. The uterus was exteriorized and cleared of all clots and remaining products of conception. The uterine incision was reapproximated using 0 Vicryl in a running locked fashion. A second imbricating layer was placed using 0 Monocryl.  The pelvis was irrigated and suctioned of all remaining blood clots.  The uterus was replaced back into the abdomen without difficulties.  Final inspection of hysterotomy reveals good hemostasis.  Garret was applied to the surgical site.  The Jourdan retractor was removed.  The fascia was reapproximated using 0 Vicryl in a running nonlocked fashion.  The subcutaneous tissue was irrigated and suction.  Hemostasis achieved with  Bovie electrocautery.  The subcutaneous adipose tissue was closed in 2 layers with a running horizontal stitch using 3-0 plain gut suture. The skin was reapproximated using 4-0 Vicryl in a running subcuticular stitch.  At the end of the procedure, the uterus was expressed for any remaining blood clots.  The incision was covered with the Provena dressing. All needle, sponge, and instrument counts were noted to be correct x2 at the end of the procedure. The patient tolerated the procedure well and was transferred to the recovery room in stable condition.     Dr. Lee was responsible for performing the following activities: Retraction, Suction, Irrigation, and Delivery of Fetus and their skilled assistance was necessary for the success of this case.      Roselia Dutton,      Date: 3/28/2025  Time: 19:48 EDT

## 2025-03-28 NOTE — ANESTHESIA PREPROCEDURE EVALUATION
Anesthesia Evaluation     Patient summary reviewed and Nursing notes reviewed   no history of anesthetic complications:   NPO Solid Status: > 8 hours  NPO Liquid Status: > 2 hours           Airway   Mallampati: II  TM distance: >3 FB  Neck ROM: full  No difficulty expected  Dental - normal exam     Pulmonary - negative pulmonary ROS and normal exam    breath sounds clear to auscultation  Cardiovascular - negative cardio ROS and normal exam  Exercise tolerance: good (4-7 METS)    Rhythm: regular  Rate: normal        Neuro/Psych- negative ROS  GI/Hepatic/Renal/Endo    (+) obesity, morbid obesity, GERD well controlled    Musculoskeletal (-) negative ROS    Abdominal   (+) obese   Substance History - negative use     OB/GYN    (+) Pregnant        Other - negative ROS       ROS/Med Hx Other: PAT Nursing Notes unavailable.               Anesthesia Plan    ASA 3     spinal       Anesthetic plan, risks, benefits, and alternatives have been provided, discussed and informed consent has been obtained with: patient.    Plan discussed with CRNA.    CODE STATUS:    Code Status (Patient has no pulse and is not breathing): CPR (Attempt to Resuscitate)  Medical Interventions (Patient has pulse or is breathing): Full

## 2025-03-28 NOTE — ANESTHESIA PROCEDURE NOTES
Spinal Block      Patient reassessed immediately prior to procedure    Patient location during procedure: OB  Start Time: 3/28/2025 12:45 PM  Stop Time: 3/28/2025 1:02 PM  Indication:at surgeon's request and post-op pain management  Performed By  DYLLAN/CAA: Anton Paz, DYLLAN  Preanesthetic Checklist  Completed: patient identified, IV checked, risks and benefits discussed, surgical consent, monitors and equipment checked, pre-op evaluation and timeout performed  Spinal Block Prep:  Patient Position:sitting  Sterile Tech:cap, gloves, mask and sterile barriers  Prep:Betadine  Patient Monitoring:blood pressure monitoring, continuous pulse oximetry and EKG    Spinal Block Procedure  Approach:midline  Guidance:landmark technique and palpation technique  Location:L3-L4  Needle Type:Pencan  Needle Gauge:24 G  Placement of Spinal needle event:cerebrospinal fluid aspirated  Paresthesia: no  Fluid Appearance:clear  Medications: bupivacaine PF (MARCAINE) injection 0.75% - Intrathecal   1.9 mL - 3/28/2025 12:59:00 PM  morphine PF (DURAMORPH) injection - Intrathecal   0.15 mg - 3/28/2025 12:59:00 PM   Post Assessment  Patient Tolerance:patient tolerated the procedure well with no apparent complications  Complications no  Additional Notes  Pt supine with BRITTNEY

## 2025-03-29 LAB
BASOPHILS # BLD AUTO: 0.04 10*3/MM3 (ref 0–0.2)
BASOPHILS NFR BLD AUTO: 0.4 % (ref 0–1.5)
DEPRECATED RDW RBC AUTO: 43.1 FL (ref 37–54)
EOSINOPHIL # BLD AUTO: 0.12 10*3/MM3 (ref 0–0.4)
EOSINOPHIL NFR BLD AUTO: 1.1 % (ref 0.3–6.2)
ERYTHROCYTE [DISTWIDTH] IN BLOOD BY AUTOMATED COUNT: 13.7 % (ref 12.3–15.4)
HCT VFR BLD AUTO: 27.6 % (ref 34–46.6)
HGB BLD-MCNC: 9.2 G/DL (ref 12–15.9)
IMM GRANULOCYTES # BLD AUTO: 0.13 10*3/MM3 (ref 0–0.05)
IMM GRANULOCYTES NFR BLD AUTO: 1.2 % (ref 0–0.5)
LYMPHOCYTES # BLD AUTO: 2.03 10*3/MM3 (ref 0.7–3.1)
LYMPHOCYTES NFR BLD AUTO: 19.2 % (ref 19.6–45.3)
MCH RBC QN AUTO: 28.9 PG (ref 26.6–33)
MCHC RBC AUTO-ENTMCNC: 33.3 G/DL (ref 31.5–35.7)
MCV RBC AUTO: 86.8 FL (ref 79–97)
MONOCYTES # BLD AUTO: 0.81 10*3/MM3 (ref 0.1–0.9)
MONOCYTES NFR BLD AUTO: 7.6 % (ref 5–12)
NEUTROPHILS NFR BLD AUTO: 7.46 10*3/MM3 (ref 1.7–7)
NEUTROPHILS NFR BLD AUTO: 70.5 % (ref 42.7–76)
NRBC BLD AUTO-RTO: 0 /100 WBC (ref 0–0.2)
PLATELET # BLD AUTO: 194 10*3/MM3 (ref 140–450)
PMV BLD AUTO: 9 FL (ref 6–12)
RBC # BLD AUTO: 3.18 10*6/MM3 (ref 3.77–5.28)
WBC NRBC COR # BLD AUTO: 10.59 10*3/MM3 (ref 3.4–10.8)

## 2025-03-29 PROCEDURE — 85025 COMPLETE CBC W/AUTO DIFF WBC: CPT | Performed by: STUDENT IN AN ORGANIZED HEALTH CARE EDUCATION/TRAINING PROGRAM

## 2025-03-29 PROCEDURE — 25010000002 ENOXAPARIN PER 10 MG: Performed by: STUDENT IN AN ORGANIZED HEALTH CARE EDUCATION/TRAINING PROGRAM

## 2025-03-29 PROCEDURE — 25010000002 KETOROLAC TROMETHAMINE PER 15 MG: Performed by: STUDENT IN AN ORGANIZED HEALTH CARE EDUCATION/TRAINING PROGRAM

## 2025-03-29 RX ORDER — DOCUSATE SODIUM 100 MG/1
100 CAPSULE, LIQUID FILLED ORAL 2 TIMES DAILY PRN
Qty: 60 CAPSULE | Refills: 0 | Status: SHIPPED | OUTPATIENT
Start: 2025-03-29

## 2025-03-29 RX ORDER — OXYCODONE HYDROCHLORIDE 5 MG/1
5 TABLET ORAL EVERY 6 HOURS PRN
Qty: 10 TABLET | Refills: 0 | Status: SHIPPED | OUTPATIENT
Start: 2025-03-29

## 2025-03-29 RX ORDER — IBUPROFEN 800 MG/1
800 TABLET, FILM COATED ORAL EVERY 8 HOURS PRN
Qty: 30 TABLET | Refills: 1 | Status: SHIPPED | OUTPATIENT
Start: 2025-03-29

## 2025-03-29 RX ADMIN — IBUPROFEN 800 MG: 800 TABLET, FILM COATED ORAL at 22:23

## 2025-03-29 RX ADMIN — ACETAMINOPHEN 1000 MG: 500 TABLET ORAL at 06:43

## 2025-03-29 RX ADMIN — ACETAMINOPHEN 650 MG: 325 TABLET ORAL at 18:20

## 2025-03-29 RX ADMIN — KETOROLAC TROMETHAMINE 15 MG: 30 INJECTION, SOLUTION INTRAMUSCULAR; INTRAVENOUS at 09:56

## 2025-03-29 RX ADMIN — ENOXAPARIN SODIUM 40 MG: 100 INJECTION SUBCUTANEOUS at 12:56

## 2025-03-29 RX ADMIN — MAGNESIUM HYDROXIDE 10 ML: 2400 SUSPENSION ORAL at 13:50

## 2025-03-29 RX ADMIN — SENNOSIDES AND DOCUSATE SODIUM 1 TABLET: 50; 8.6 TABLET ORAL at 22:18

## 2025-03-29 RX ADMIN — ACETAMINOPHEN 1000 MG: 500 TABLET ORAL at 12:56

## 2025-03-29 RX ADMIN — ACETAMINOPHEN 1000 MG: 500 TABLET ORAL at 00:20

## 2025-03-29 RX ADMIN — SENNOSIDES AND DOCUSATE SODIUM 1 TABLET: 50; 8.6 TABLET ORAL at 09:56

## 2025-03-29 RX ADMIN — KETOROLAC TROMETHAMINE 15 MG: 30 INJECTION, SOLUTION INTRAMUSCULAR; INTRAVENOUS at 16:21

## 2025-03-29 RX ADMIN — KETOROLAC TROMETHAMINE 15 MG: 30 INJECTION, SOLUTION INTRAMUSCULAR; INTRAVENOUS at 03:54

## 2025-03-29 NOTE — PLAN OF CARE
Goal Outcome Evaluation:  Plan of Care Reviewed With: patient        Progress: improving  Outcome Evaluation: Pt up ad joe, pain is controlled. Pt is voiding. Pt bonding with infant.

## 2025-03-29 NOTE — PLAN OF CARE
Goal Outcome Evaluation:  Plan of Care Reviewed With: patient        Progress: improving  Outcome Evaluation: ambulating in room; pain controlled with non narcotic meds this shift; voiding, passing flatus, performing infant care bleeding wdl

## 2025-03-29 NOTE — PROGRESS NOTES
[facility] Delivery Progress Note    Subjective   Postpartum Day 1: [facility] Delivery    The patient feels well.  Her pain is well controlled.   She is ambulating well.  Patient describes her bleeding as moderate lochia.  Patient is tolerating regular diet and urinating without difficulty.    Breastfeeding: declines.  Currently bottle feeding    Objective     Vital Signs Range for the last 24 hours  Temperature: Temp:  [97.6 °F (36.4 °C)-98.3 °F (36.8 °C)] 97.6 °F (36.4 °C)   Temp Source: Temp src: Oral   BP: BP: (102-139)/(46-96) 127/79   Pulse: Heart Rate:  [] 108   Respirations: Resp:  [16-20] 16     Physical Exam:  General:  no acute distress.  Abdomen: abdomen is soft without significant tenderness, masses, organomegaly or guarding.   Fundus: appropriate, firm, non tender, moderate lochia  Dressing: clean, dry and intact Preveena dressing in place.  Extremities: normal, atraumatic, no cyanosis, and trace edema.     Rubella:   Rubella Antibodies, IgG   Date Value Ref Range Status   2024 1.79 Immune >0.99 index Final     Comment:                                     Non-immune       <0.90                                  Equivocal  0.90 - 0.99                                  Immune           >0.99     Rh Status:    RH type   Date Value Ref Range Status   2025 Positive  Final     Immunizations:   Immunization History   Administered Date(s) Administered    Covid-19 (Pfizer) Gray Cap Monovalent 2022    Flu Vaccine Intradermal Quad 18-64YR 2022    Fluzone  >6mos 2024    HPV Quadrivalent 2007    Influenza Seasonal Injectable 2022    Tdap 2007, 2014       Assessment & Plan       Normal pregnancy      Jessie Schultz is Day 1  post-partum  , Low Transverse  .      Plan:  Continue current care in house.                        Discharge to home on POD #2 or 3 if stable.               Candida Fletcher MD    Electronically signed by Candida Fletcher MD,  03/29/25, 12:04 PM EDT.

## 2025-03-29 NOTE — ANESTHESIA POSTPROCEDURE EVALUATION
Patient: Jessie Schultz    Procedure Summary       Date: 25 Room / Location: [facility] LABOR DELIVERY  [facility]OR DELIVERY    Anesthesia Start: 1245 Anesthesia Stop: 1409    Procedure:  SECTION PRIMARY (Abdomen) Diagnosis: (SUPPECTED MACROSOMIA)    Surgeons: Roselia Dutton DO Provider: Anton Paz CRNA    Anesthesia Type: spinal ASA Status: 3            Anesthesia Type: spinal    Vitals  Vitals Value Taken Time   /79 25 10:00   Temp 36.4 °C (97.6 °F) 25 10:00   Pulse 108 25 10:00   Resp 16 25 10:00   SpO2 100 % 25 16:57   Vitals shown include unfiled device data.        Post Anesthesia Care and Evaluation    Patient location during evaluation: bedside  Patient participation: complete - patient participated  Level of consciousness: awake  Pain management: adequate    Airway patency: patent  Anesthetic complications: No anesthetic complications  PONV Status: controlled  Cardiovascular status: acceptable and stable  Respiratory status: acceptable  Hydration status: acceptable  Post Neuraxial Block status: Motor and sensory function returned to baseline and No signs or symptoms of PDPH

## 2025-03-29 NOTE — DISCHARGE SUMMARY
[facility]  Delivery Discharge Summary    Patient Name: Jessie Schultz  : 1995  MRN: [id number]    Date of Admission: 3/28/2025  Date of Discharge:  3/30/2025   Primary Care Physician: Ai Arrington APRN  OB Clinician: Roselia Dutton DO    Procedures:  3/28/2025 - , Low Transverse     Admitting Diagnosis:  Normal pregnancy [Z34.90]    Discharge Diagnosis:  Same as Admitting plus:   Pregnancy at 39w2d - Delivered     Antepartum complications: none    Delivery:   Date of Delivery: 3/28/2025  Time of Delivery: 1:23 PM  Delivered By:  Roselia Dutton  Delivery Type: , Low Transverse   Anesthesia: Spinal   Intrapartum complications: None  Placenta: Expressed      Baby:  female infant;   Apgar:  8  @ 1 minute /   Apgar:  9  @ 5 minutes   Weight: 3720 g (8 lb 3.2 oz)   Length: 20    Feeding method: Breastfeeding Status: No    Discharge Details:     Discharge Medications        New Medications        Instructions Start Date   docusate sodium 100 MG capsule  Commonly known as: Colace   100 mg, Oral, 2 Times Daily PRN      ibuprofen 800 MG tablet  Commonly known as: ADVIL,MOTRIN   800 mg, Oral, Every 8 Hours PRN      oxyCODONE 5 MG immediate release tablet  Commonly known as: ROXICODONE   5 mg, Oral, Every 6 Hours PRN             Continue These Medications        Instructions Start Date   cetirizine 10 MG tablet  Commonly known as: zyrTEC   10 mg, Oral, Daily      doxylamine 25 MG tablet  Commonly known as: UNISOM   25 mg, Oral, Nightly PRN      lactulose 10 GM/15ML solution  Commonly known as: CHRONULAC   20 g, Oral, Daily PRN      ondansetron 8 MG tablet  Commonly known as: ZOFRAN   8 mg, Oral, Every 8 Hours PRN      prenatal (CLASSIC) vitamin 28-0.8 MG tablet tablet  Generic drug: prenatal vitamin   Daily      sodium chloride 0.65 % nasal spray   1 spray, Nasal, As Needed      vitamin B-6 25 MG tablet  Commonly known as: PYRIDOXINE   25 mg, Oral, Daily               Allergies   Allergen Reactions     Sulfa Antibiotics Hives    Sulfacetamide Sodium Hives    Sulfamethoxazole Hives    Sulfamethoxazole-Trimethoprim Hives    Sulfasalazine Hives    Sulfonylureas Hives       Discharge Disposition:   Home, self-care    Discharge Condition:  Good    Follow Up:  No future appointments.      Plan:  Follow up 2 weeks for postpartum appt    Electronically signed by Roselia Dutton DO, 03/29/25, 11:26 AM EDT.

## 2025-03-30 VITALS
BODY MASS INDEX: 46.81 KG/M2 | DIASTOLIC BLOOD PRESSURE: 88 MMHG | HEIGHT: 69 IN | SYSTOLIC BLOOD PRESSURE: 127 MMHG | TEMPERATURE: 98.8 F | RESPIRATION RATE: 18 BRPM | OXYGEN SATURATION: 100 % | HEART RATE: 95 BPM

## 2025-03-30 PROCEDURE — 25010000002 ENOXAPARIN PER 10 MG: Performed by: STUDENT IN AN ORGANIZED HEALTH CARE EDUCATION/TRAINING PROGRAM

## 2025-03-30 RX ADMIN — IBUPROFEN 800 MG: 800 TABLET, FILM COATED ORAL at 06:14

## 2025-03-30 RX ADMIN — SENNOSIDES AND DOCUSATE SODIUM 1 TABLET: 50; 8.6 TABLET ORAL at 09:00

## 2025-03-30 RX ADMIN — ENOXAPARIN SODIUM 40 MG: 100 INJECTION SUBCUTANEOUS at 01:31

## 2025-03-30 RX ADMIN — ACETAMINOPHEN 650 MG: 325 TABLET ORAL at 06:14

## 2025-03-30 RX ADMIN — ACETAMINOPHEN 650 MG: 325 TABLET ORAL at 01:31

## 2025-03-30 NOTE — PLAN OF CARE
Goal Outcome Evaluation:  Plan of Care Reviewed With: patient        Progress: improving  Outcome Evaluation: Up ad joe. Self care. Pain controlled with medication. Old bruising noted above left wound vac. dressing. Bonding well with baby.

## 2025-03-30 NOTE — PLAN OF CARE
Goal Outcome Evaluation:           Progress: improving  Outcome Evaluation: Patient up adlib, VSS, assessment WNL. Pain well controlled with medication and heating pad. Bruising noted above wound vac dressing. Bonding well with infant.

## 2025-03-30 NOTE — PROGRESS NOTES
Postpartum  Section       POD#2     Jessie Schultz is a 29 y.o.  who underwent a  sectionPLTCS  She complains of appropriate pain per palpation that is controlled by pain meds. Patient is urinating appropriately.  Patient is eating without any N/V. Patient states lochia is light. Patient is ambulating without any difficulty. Patient is bottle feed      I/O last 3 completed shifts:  In: -   Out: 3500 [Urine:3500]  No intake/output data recorded.    Physical Exam:  HEENT:Normocephalic and atraumatic, NAD  Lungs: No labored breathing  Abdomen: Soft, appropriate tenderness to palpation, Uterine fundus firm and below the umbilicus  Wound:  Clean, Dry and Intact, negative drainage, Provena dressing in place  Extremities: 1+ edema    Recent Results (from the past 48 hours)   Type & Screen    Collection Time: 25 10:38 AM    Specimen: Blood   Result Value Ref Range    ABO Type A     RH type Positive     Antibody Screen Negative     T&S Expiration Date 3/31/2025 11:59:59 PM    CBC (No Diff)    Collection Time: 25 10:38 AM    Specimen: Blood   Result Value Ref Range    WBC 12.37 (H) 3.40 - 10.80 10*3/mm3    RBC 4.29 3.77 - 5.28 10*6/mm3    Hemoglobin 12.6 12.0 - 15.9 g/dL    Hematocrit 36.4 34.0 - 46.6 %    MCV 84.8 79.0 - 97.0 fL    MCH 29.4 26.6 - 33.0 pg    MCHC 34.6 31.5 - 35.7 g/dL    RDW 13.6 12.3 - 15.4 %    RDW-SD 41.9 37.0 - 54.0 fl    MPV 9.4 6.0 - 12.0 fL    Platelets 285 140 - 450 10*3/mm3   Treponema pallidum AB w/Reflex RPR    Collection Time: 25 10:38 AM    Specimen: Blood   Result Value Ref Range    Treponemal AB Total Non-Reactive Non-Reactive   Urine Drug Screen - Urine, Clean Catch    Collection Time: 25 10:38 AM    Specimen: Urine, Clean Catch   Result Value Ref Range    THC, Screen, Urine Negative Negative    Phencyclidine (PCP), Urine Negative Negative    Cocaine Screen, Urine Negative Negative    Methamphetamine, Ur Negative Negative    Opiate Screen  Negative Negative    Amphetamine Screen, Urine Negative Negative    Benzodiazepine Screen, Urine Negative Negative    Tricyclic Antidepressants Screen Negative Negative    Methadone Screen, Urine Negative Negative    Barbiturates Screen, Urine Negative Negative    Oxycodone Screen, Urine Negative Negative    Buprenorphine, Screen, Urine Negative Negative   Fentanyl, Urine - Urine, Clean Catch    Collection Time: 03/28/25 10:38 AM    Specimen: Urine, Clean Catch   Result Value Ref Range    Fentanyl, Urine Negative Negative   Blood Gas, Venous, Cord    Collection Time: 03/28/25  1:47 PM    Specimen: Umbilical Cord; Cord Blood Venous   Result Value Ref Range    pH, Cord Venous 7.342 7.260 - 7.400 pH Units    pCO2, Cord Venous 45.5 35.0 - 51.3 mm Hg    pO2, Cord Venous 25.2 19.0 - 39.0 mm Hg    HCO3, Cord Venous 24.7 mmol/L    Base Excess, Cord Venous -1.5 -30.0 - 30.0 mmol/L    O2 Sat, Cord Venous 41.9 %    Barometric Pressure for Blood Gas 745.5000 mmHg   Blood Gas, Arterial, Cord    Collection Time: 03/28/25  1:50 PM    Specimen: Umbilical Cord; Cord Blood Arterial   Result Value Ref Range    pH, Cord Arterial 7.25 7.18 - 7.34 pH Units    pCO2, Cord Arterial 61.3 (H) 33.0 - 49.0 mmHg    pO2, Cord Arterial 16.4 mmHg    HCO3, Cord Arterial 26.7 mmol/L    Base Exc, Cord Arterial -2.5 (L) -2.0 - 2.0 mmol/L    O2 Sat, Cord Arterial 16.7 %    Barometric Pressure for Blood Gas 743.2000 mmHg   CBC Auto Differential    Collection Time: 03/29/25  5:19 AM    Specimen: Arm, Right; Blood   Result Value Ref Range    WBC 10.59 3.40 - 10.80 10*3/mm3    RBC 3.18 (L) 3.77 - 5.28 10*6/mm3    Hemoglobin 9.2 (L) 12.0 - 15.9 g/dL    Hematocrit 27.6 (L) 34.0 - 46.6 %    MCV 86.8 79.0 - 97.0 fL    MCH 28.9 26.6 - 33.0 pg    MCHC 33.3 31.5 - 35.7 g/dL    RDW 13.7 12.3 - 15.4 %    RDW-SD 43.1 37.0 - 54.0 fl    MPV 9.0 6.0 - 12.0 fL    Platelets 194 140 - 450 10*3/mm3    Neutrophil % 70.5 42.7 - 76.0 %    Lymphocyte % 19.2 (L) 19.6 - 45.3 %     Monocyte % 7.6 5.0 - 12.0 %    Eosinophil % 1.1 0.3 - 6.2 %    Basophil % 0.4 0.0 - 1.5 %    Immature Grans % 1.2 (H) 0.0 - 0.5 %    Neutrophils, Absolute 7.46 (H) 1.70 - 7.00 10*3/mm3    Lymphocytes, Absolute 2.03 0.70 - 3.10 10*3/mm3    Monocytes, Absolute 0.81 0.10 - 0.90 10*3/mm3    Eosinophils, Absolute 0.12 0.00 - 0.40 10*3/mm3    Basophils, Absolute 0.04 0.00 - 0.20 10*3/mm3    Immature Grans, Absolute 0.13 (H) 0.00 - 0.05 10*3/mm3    nRBC 0.0 0.0 - 0.2 /100 WBC   ]      Vitals:    03/30/25 0506   BP: 133/66   Pulse: 82   Resp: 18   Temp: 98.2 °F (36.8 °C)   SpO2:            ASSESSMENT/PLAN:    Patient is a 29 y.o.female who is POD# 2 s/p PCS  - Progressing as expected  - Vital signs per protocol  - Rh pos /Rubella  immune  -bottle feed  - Continue pain regimen for pain control  - Encourage ambulation  - SCDs while in bed    Stable for DC home      Roselia Dutton DO

## 2025-04-09 NOTE — PROGRESS NOTES
"  POSTPARTUM Follow Up Visit      Chief Complaint   Patient presents with    Postpartum Care     HPI:      Date of delivery: 3/8/25  Delivery type:   LTCS          Perineum : Intact  Delivering Provider:   Dr. Roselia Dutton      Feeding: Bottle  Pain:  No, possible UTI  Vaginal Bleeding:  No  Depressed/Anxious:  No  EPDS score: 0   #10: 0  Plans for BC:  Birth control pills  Weight at last OB OV:  317 Lb   Last pap date and result:   Last Completed Pap Smear            Upcoming       PAP SMEAR (Every 3 Years) Next due on 2024  IGP,CtNgTv,Apt HPV,rfx 16 / 18,45                                 Postpartum Depression: Low Risk  (4/10/2025)    [place]  Depression Scale     Last EPDS Total Score: 0     Last EPDS Self Harm Result: Never       PHYSICAL EXAM:  /78   Pulse 89   Ht 175.3 cm (69\")   Wt 136 kg (300 lb)   LMP 2024   Breastfeeding No   BMI 44.30 kg/m²  28.6 kg (63 lb)  General- NAD, alert and oriented, appropriate  Psych- Normal mood, good memory, good eye contact    INCISION : Clean, dry, intact, no evidence of infection  Abdomen- Soft, non tender, some edema noted    Ext- No edema    ASSESSMENT AND PLAN:  Diagnoses and all orders for this visit:    1. Postpartum care following  delivery (Primary)    2. Dysuria  -     Urine Culture - Urine, Urine, Clean Catch      Counseling:    May resume intercourse once 4 weeks postpartum  Ok to return to work/school once patient desires/maternity leave completed        Follow Up:  Return in about 4 weeks (around 2025) for Postpartum.          Roselia Dutton DO  04/10/2025    [facility] [address] [facility]  [address] [place] [address] [address] [place] [address]  Dept: [phone]  Dept Fax: [phone]  Loc: [phone]         "

## 2025-04-10 ENCOUNTER — POSTPARTUM VISIT (OUTPATIENT)
Dept: OBSTETRICS AND GYNECOLOGY | Age: 30
End: 2025-04-10
Payer: COMMERCIAL

## 2025-04-10 VITALS
HEIGHT: 69 IN | WEIGHT: 293 LBS | DIASTOLIC BLOOD PRESSURE: 78 MMHG | HEART RATE: 89 BPM | SYSTOLIC BLOOD PRESSURE: 116 MMHG | BODY MASS INDEX: 43.4 KG/M2

## 2025-04-10 DIAGNOSIS — R30.0 DYSURIA: ICD-10-CM

## 2025-04-10 PROCEDURE — 87086 URINE CULTURE/COLONY COUNT: CPT | Performed by: STUDENT IN AN ORGANIZED HEALTH CARE EDUCATION/TRAINING PROGRAM

## 2025-04-11 LAB — BACTERIA SPEC AEROBE CULT: NO GROWTH

## 2025-04-18 ENCOUNTER — HOSPITAL ENCOUNTER (EMERGENCY)
Facility: HOSPITAL | Age: 30
Discharge: HOME | End: 2025-04-18
Attending: EMERGENCY MEDICINE
Payer: COMMERCIAL

## 2025-04-18 ENCOUNTER — TELEPHONE (OUTPATIENT)
Dept: OBSTETRICS AND GYNECOLOGY | Age: 30
End: 2025-04-18
Payer: COMMERCIAL

## 2025-04-18 VITALS
TEMPERATURE: 98 F | BODY MASS INDEX: 43.4 KG/M2 | HEART RATE: 87 BPM | DIASTOLIC BLOOD PRESSURE: 75 MMHG | OXYGEN SATURATION: 100 % | RESPIRATION RATE: 16 BRPM | SYSTOLIC BLOOD PRESSURE: 120 MMHG | HEIGHT: 69 IN | WEIGHT: 293 LBS

## 2025-04-18 LAB
ALBUMIN SERPL-MCNC: 3.9 G/DL (ref 3.5–5.2)
ALBUMIN/GLOB SERPL: 1 G/DL
ALP SERPL-CCNC: 105 U/L (ref 39–117)
ALT SERPL W P-5'-P-CCNC: 9 U/L (ref 1–33)
ANION GAP SERPL CALCULATED.3IONS-SCNC: 12.3 MMOL/L (ref 5–15)
AST SERPL-CCNC: 11 U/L (ref 1–32)
BASOPHILS # BLD AUTO: 0.04 10*3/MM3 (ref 0–0.2)
BASOPHILS NFR BLD AUTO: 0.4 % (ref 0–1.5)
BILIRUB SERPL-MCNC: <0.2 MG/DL (ref 0–1.2)
BUN SERPL-MCNC: 13 MG/DL (ref 6–20)
BUN/CREAT SERPL: 28.3 (ref 7–25)
CALCIUM SPEC-SCNC: 9.5 MG/DL (ref 8.6–10.5)
CHLORIDE SERPL-SCNC: 105 MMOL/L (ref 98–107)
CO2 SERPL-SCNC: 23.7 MMOL/L (ref 22–29)
CREAT SERPL-MCNC: 0.46 MG/DL (ref 0.57–1)
DEPRECATED RDW RBC AUTO: 37.5 FL (ref 37–54)
EGFRCR SERPLBLD CKD-EPI 2021: 133 ML/MIN/1.73
EOSINOPHIL # BLD AUTO: 0.25 10*3/MM3 (ref 0–0.4)
EOSINOPHIL NFR BLD AUTO: 2.2 % (ref 0.3–6.2)
ERYTHROCYTE [DISTWIDTH] IN BLOOD BY AUTOMATED COUNT: 12.2 % (ref 12.3–15.4)
GLOBULIN UR ELPH-MCNC: 3.9 GM/DL
GLUCOSE SERPL-MCNC: 95 MG/DL (ref 65–99)
HCG INTACT+B SERPL-ACNC: <0.5 MIU/ML
HCT VFR BLD AUTO: 34.9 % (ref 34–46.6)
HGB BLD-MCNC: 11.6 G/DL (ref 12–15.9)
HOLD SPECIMEN: NORMAL
IMM GRANULOCYTES # BLD AUTO: 0.12 10*3/MM3 (ref 0–0.05)
IMM GRANULOCYTES NFR BLD AUTO: 1.1 % (ref 0–0.5)
LIPASE SERPL-CCNC: 26 U/L (ref 13–60)
LYMPHOCYTES # BLD AUTO: 1.8 10*3/MM3 (ref 0.7–3.1)
LYMPHOCYTES NFR BLD AUTO: 15.9 % (ref 19.6–45.3)
MCH RBC QN AUTO: 28.3 PG (ref 26.6–33)
MCHC RBC AUTO-ENTMCNC: 33.2 G/DL (ref 31.5–35.7)
MCV RBC AUTO: 85.1 FL (ref 79–97)
MONOCYTES # BLD AUTO: 0.44 10*3/MM3 (ref 0.1–0.9)
MONOCYTES NFR BLD AUTO: 3.9 % (ref 5–12)
NEUTROPHILS NFR BLD AUTO: 76.5 % (ref 42.7–76)
NEUTROPHILS NFR BLD AUTO: 8.67 10*3/MM3 (ref 1.7–7)
NRBC BLD AUTO-RTO: 0 /100 WBC (ref 0–0.2)
PLATELET # BLD AUTO: 371 10*3/MM3 (ref 140–450)
PMV BLD AUTO: 8.7 FL (ref 6–12)
POTASSIUM SERPL-SCNC: 4.8 MMOL/L (ref 3.5–5.2)
PROT SERPL-MCNC: 7.8 G/DL (ref 6–8.5)
RBC # BLD AUTO: 4.1 10*6/MM3 (ref 3.77–5.28)
SODIUM SERPL-SCNC: 141 MMOL/L (ref 136–145)
WBC NRBC COR # BLD AUTO: 11.32 10*3/MM3 (ref 3.4–10.8)
WHOLE BLOOD HOLD COAG: NORMAL
WHOLE BLOOD HOLD SPECIMEN: NORMAL

## 2025-04-18 PROCEDURE — 87070 CULTURE OTHR SPECIMN AEROBIC: CPT

## 2025-04-18 PROCEDURE — 96365 THER/PROPH/DIAG IV INF INIT: CPT

## 2025-04-18 PROCEDURE — 83690 ASSAY OF LIPASE: CPT | Performed by: EMERGENCY MEDICINE

## 2025-04-18 PROCEDURE — 25010000002 CLINDAMYCIN 600 MG/50ML SOLUTION

## 2025-04-18 PROCEDURE — 99283 EMERGENCY DEPT VISIT LOW MDM: CPT

## 2025-04-18 PROCEDURE — 84702 CHORIONIC GONADOTROPIN TEST: CPT | Performed by: EMERGENCY MEDICINE

## 2025-04-18 PROCEDURE — 87186 SC STD MICRODIL/AGAR DIL: CPT

## 2025-04-18 PROCEDURE — 87205 SMEAR GRAM STAIN: CPT

## 2025-04-18 PROCEDURE — 80053 COMPREHEN METABOLIC PANEL: CPT | Performed by: EMERGENCY MEDICINE

## 2025-04-18 PROCEDURE — 85025 COMPLETE CBC W/AUTO DIFF WBC: CPT | Performed by: EMERGENCY MEDICINE

## 2025-04-18 PROCEDURE — 87147 CULTURE TYPE IMMUNOLOGIC: CPT

## 2025-04-18 RX ORDER — CLINDAMYCIN HYDROCHLORIDE 300 MG/1
300 CAPSULE ORAL 4 TIMES DAILY
Qty: 28 CAPSULE | Refills: 0 | Status: SHIPPED | OUTPATIENT
Start: 2025-04-18 | End: 2025-04-25

## 2025-04-18 RX ORDER — GINSENG 100 MG
1 CAPSULE ORAL 2 TIMES DAILY
Qty: 13 G | Refills: 0 | Status: SHIPPED | OUTPATIENT
Start: 2025-04-18 | End: 2025-04-25

## 2025-04-18 RX ORDER — SODIUM CHLORIDE 0.9 % (FLUSH) 0.9 %
10 SYRINGE (ML) INJECTION AS NEEDED
Status: DISCONTINUED | OUTPATIENT
Start: 2025-04-18 | End: 2025-04-18 | Stop reason: HOSPADM

## 2025-04-18 RX ORDER — HYDROCODONE BITARTRATE AND ACETAMINOPHEN 7.5; 325 MG/1; MG/1
1 TABLET ORAL ONCE
Refills: 0 | Status: COMPLETED | OUTPATIENT
Start: 2025-04-18 | End: 2025-04-18

## 2025-04-18 RX ORDER — CLINDAMYCIN PHOSPHATE 600 MG/50ML
600 INJECTION, SOLUTION INTRAVENOUS ONCE
Status: COMPLETED | OUTPATIENT
Start: 2025-04-18 | End: 2025-04-18

## 2025-04-18 RX ORDER — DIAPER,BRIEF,INFANT-TODD,DISP
1 EACH MISCELLANEOUS ONCE
Status: COMPLETED | OUTPATIENT
Start: 2025-04-18 | End: 2025-04-18

## 2025-04-18 RX ADMIN — CLINDAMYCIN IN 5 PERCENT DEXTROSE 600 MG: 12 INJECTION, SOLUTION INTRAVENOUS at 12:32

## 2025-04-18 RX ADMIN — HYDROCODONE BITARTRATE AND ACETAMINOPHEN 1 TABLET: 7.5; 325 TABLET ORAL at 12:36

## 2025-04-18 RX ADMIN — BACITRACIN 0.9 G: 500 OINTMENT TOPICAL at 12:29

## 2025-04-18 NOTE — ED PROVIDER NOTES
Time: 11:13 AM EDT  Date of encounter:  2025  Independent Historian/Clinical History and Information was obtained by:   Patient    History is limited by: N/A    Chief Complaint: Abdominal wound      History of Present Illness:  Patient is a 29 y.o. year old female who presents to the emergency department for evaluation of abdominal wound from  section on 3/28.  Patient states that her incision opened up and having excessive drainage and increased pain since this morning.  Patient states that she fell this morning and hit her head in the shower and is complaining of headache.  Patient denies LOC.  Patient states that she was given a wound VAC post  section due to the increased birth size of her child during delivery.  She had a follow-up appointment on the  and was cleared by her OB/GYN, during this visit there was no signs of infection present to the incision.      Patient Care Team  Primary Care Provider: Ai Arrington APRN    Past Medical History:     Allergies   Allergen Reactions    Sulfa Antibiotics Hives    Sulfacetamide Sodium Hives    Sulfamethoxazole Hives    Sulfamethoxazole-Trimethoprim Hives    Sulfasalazine Hives    Sulfonylureas Hives     Past Medical History:   Diagnosis Date    Allergies     Ankle fracture, left 2024    Carpal tunnel syndrome     LEFT    Hyperlipidemia     Prediabetes      Past Surgical History:   Procedure Laterality Date    ADENOIDECTOMY      CARPAL TUNNEL RELEASE Right 2016    CARPAL TUNNEL RELEASE Left 2023    Procedure: CARPAL TUNNEL RELEASE;  Surgeon: Bill Jones MD;  Location: [facility];  Service: Orthopedics;  Laterality: Left;     SECTION N/A 3/28/2025    Procedure:  SECTION PRIMARY;  Surgeon: Roselia Dutton DO;  Location: [facility] LABOR DELIVERY;  Service: Gynecology;  Laterality: N/A;    TONSILLECTOMY  2012    TYMPANOSTOMY TUBE PLACEMENT      x2     Family History   Problem Relation Age of Onset     Hypertension Father     Diabetes Father     Hypertension Mother     Diabetes Mother     Hypertension Brother     Diabetes Paternal Grandfather     Diabetes Maternal Grandmother     Deep vein thrombosis Other     Colon cancer Other     Malig Hyperthermia Neg Hx     Miscarriages / Stillbirths Neg Hx     Mental retardation Neg Hx     Mental illness Neg Hx     Learning disabilities Neg Hx     Kidney disease Neg Hx     Hyperlipidemia Neg Hx     Heart disease Neg Hx     Hearing loss Neg Hx     Early death Neg Hx     Drug abuse Neg Hx     Depression Neg Hx     COPD Neg Hx     Cancer Neg Hx     Bleeding Disorder Neg Hx     Birth defects Neg Hx     Asthma Neg Hx     Arthritis Neg Hx     Alcohol abuse Neg Hx     Breast cancer Neg Hx     Ovarian cancer Neg Hx     Uterine cancer Neg Hx     Melanoma Neg Hx     Prostate cancer Neg Hx        Home Medications:  Prior to Admission medications    Medication Sig Start Date End Date Taking? Authorizing Provider   cetirizine (zyrTEC) 10 MG tablet Take 1 tablet by mouth Daily. 2/6/25   Sergio Bob APRN   docusate sodium (Colace) 100 MG capsule Take 1 capsule by mouth 2 (Two) Times a Day As Needed for Constipation. 3/29/25   Roselia Dutton DO   doxylamine (UNISOM) 25 MG tablet Take 1 tablet by mouth At Night As Needed for Nausea. 1/9/25   Roselia Dutton DO   ibuprofen (ADVIL,MOTRIN) 800 MG tablet Take 1 tablet by mouth Every 8 (Eight) Hours As Needed for Mild Pain (alternate with tylenol). 3/29/25   Roselia Dutton DO   lactulose (CHRONULAC) 10 GM/15ML solution Take 30 mL by mouth Daily As Needed (constipation) for up to 5 doses.  Patient taking differently: Take 30 mL by mouth As Needed (constipation). 10/19/24   Kylah Huang APRN   ondansetron (ZOFRAN) 8 MG tablet Take 1 tablet by mouth Every 8 (Eight) Hours As Needed for Nausea or Vomiting. 12/26/24   Anton Slater DO   oxyCODONE (ROXICODONE) 5 MG immediate release tablet Take 1 tablet by mouth Every 6 (Six) Hours As Needed for  "Severe Pain. 3/29/25   Roselia Dutton DO   prenatal vitamin (prenatal, CLASSIC, vitamin) tablet Take  by mouth Daily.    Provider, MD Laura   sodium chloride 0.65 % nasal spray 1 spray into the nostril(s) as directed by provider As Needed for Congestion. 9/11/24   ToanAi ortiz APRCHANTALE   vitamin B-6 (PYRIDOXINE) 25 MG tablet Take 1 tablet by mouth Daily. 1/9/25   Roselia Dutton DO        Social History:   Social History     Tobacco Use    Smoking status: Never     Passive exposure: Never    Smokeless tobacco: Never   Vaping Use    Vaping status: Former    Substances: Nicotine, Flavoring    Devices: Disposable   Substance Use Topics    Alcohol use: Not Currently    Drug use: Never         Review of Systems:  Review of Systems   HENT: Negative.     Respiratory: Negative.     Cardiovascular: Negative.    Gastrointestinal: Negative.    Skin:  Positive for wound.        Physical Exam:  /75   Pulse 88   Temp 98.8 °F (37.1 °C) (Oral)   Resp 16   Ht 175.3 cm (69\")   Wt (!) 137 kg (302 lb 0.5 oz)   LMP 06/26/2024   SpO2 100%   Breastfeeding No   BMI 44.60 kg/m²     Physical Exam  Constitutional:       Appearance: She is well-developed. She is obese.   HENT:      Head: Normocephalic and atraumatic.   Eyes:      Extraocular Movements: Extraocular movements intact.      Conjunctiva/sclera: Conjunctivae normal.      Pupils: Pupils are equal, round, and reactive to light.   Cardiovascular:      Rate and Rhythm: Normal rate and regular rhythm.      Pulses: Normal pulses.      Heart sounds: Normal heart sounds.   Pulmonary:      Effort: Pulmonary effort is normal.      Breath sounds: Normal breath sounds.   Abdominal:      General: Bowel sounds are normal.      Tenderness: There is abdominal tenderness.   Musculoskeletal:         General: Normal range of motion.      Cervical back: Normal range of motion and neck supple. No tenderness.   Skin:     General: Skin is warm and dry.             Comments: Post " surgical wound to lower abdominal region/suprapubic region open and draining.    Neurological:      Mental Status: She is alert.                    Medical Decision Making:      Comorbidities that affect care:    Obesity    External Notes reviewed:    None      The following orders were placed and all results were independently analyzed by me:  Orders Placed This Encounter   Procedures    Wound Culture - Surgical Site, Abdominal Wall    [place] Draw    Comprehensive Metabolic Panel    Lipase    Urinalysis With Microscopic If Indicated (No Culture) - Urine, Clean Catch    hCG, Quantitative, Pregnancy    CBC Auto Differential    NPO Diet NPO Type: Strict NPO    Undress & Gown    Abd pad to wound  Nursing Communication    Insert Peripheral IV    CBC & Differential    Green Top (Gel)    Lavender Top    Gold Top - SST    Light Blue Top    Extra Tubes    Gold Top - SST       Medications Given in the Emergency Department:  Medications   sodium chloride 0.9 % flush 10 mL (has no administration in time range)   clindamycin (CLEOCIN) 600 mg in dextrose 5% 50 mL IVPB (premix) (600 mg Intravenous New Bag 4/18/25 1232)   HYDROcodone-acetaminophen (NORCO) 7.5-325 MG per tablet 1 tablet (1 tablet Oral Given 4/18/25 1236)   bacitracin ointment 0.9 g (0.9 g Topical Given 4/18/25 1229)        ED Course:         Labs:    Lab Results (last 24 hours)       Procedure Component Value Units Date/Time    CBC & Differential [[id number]]  (Abnormal) Collected: 04/18/25 1127    Specimen: Blood from Arm, Left Updated: 04/18/25 1136    Narrative:      The following orders were created for panel order CBC & Differential.  Procedure                               Abnormality         Status                     ---------                               -----------         ------                     CBC Auto Differential[[id number]]        Abnormal            Final result                 Please view results for these tests on the individual orders.     Comprehensive Metabolic Panel [[id number]]  (Abnormal) Collected: 04/18/25 1127    Specimen: Blood from Arm, Left Updated: 04/18/25 1156     Glucose 95 mg/dL      BUN 13 mg/dL      Creatinine 0.46 mg/dL      Sodium 141 mmol/L      Potassium 4.8 mmol/L      Chloride 105 mmol/L      CO2 23.7 mmol/L      Calcium 9.5 mg/dL      Total Protein 7.8 g/dL      Albumin 3.9 g/dL      ALT (SGPT) 9 U/L      AST (SGOT) 11 U/L      Alkaline Phosphatase 105 U/L      Total Bilirubin <0.2 mg/dL      Globulin 3.9 gm/dL      A/G Ratio 1.0 g/dL      BUN/Creatinine Ratio 28.3     Anion Gap 12.3 mmol/L      eGFR 133.0 mL/min/1.73     Narrative:      GFR Categories in Chronic Kidney Disease (CKD)      GFR Category          GFR (mL/min/1.73)    Interpretation  G1                     90 or greater         Normal or high (1)  G2                      60-89                Mild decrease (1)  G3a                   45-59                Mild to moderate decrease  G3b                   30-44                Moderate to severe decrease  G4                    15-29                Severe decrease  G5                    14 or less           Kidney failure          (1)In the absence of evidence of kidney disease, neither GFR category G1 or G2 fulfill the criteria for CKD.    eGFR calculation 2021 CKD-EPI creatinine equation, which does not include race as a factor    Lipase [[id number]]  (Normal) Collected: 04/18/25 1127    Specimen: Blood from Arm, Left Updated: 04/18/25 1156     Lipase 26 U/L     CBC Auto Differential [[id number]]  (Abnormal) Collected: 04/18/25 1127    Specimen: Blood from Arm, Left Updated: 04/18/25 1136     WBC 11.32 10*3/mm3      RBC 4.10 10*6/mm3      Hemoglobin 11.6 g/dL      Hematocrit 34.9 %      MCV 85.1 fL      MCH 28.3 pg      MCHC 33.2 g/dL      RDW 12.2 %      RDW-SD 37.5 fl      MPV 8.7 fL      Platelets 371 10*3/mm3      Neutrophil % 76.5 %      Lymphocyte % 15.9 %      Monocyte % 3.9 %      Eosinophil % 2.2 %      Basophil  % 0.4 %      Immature Grans % 1.1 %      Neutrophils, Absolute 8.67 10*3/mm3      Lymphocytes, Absolute 1.80 10*3/mm3      Monocytes, Absolute 0.44 10*3/mm3      Eosinophils, Absolute 0.25 10*3/mm3      Basophils, Absolute 0.04 10*3/mm3      Immature Grans, Absolute 0.12 10*3/mm3      nRBC 0.0 /100 WBC     Wound Culture - Surgical Site, Abdominal Wall [[id number]] Collected: 25 1127    Specimen: Surgical Site from Abdominal Wall Updated: 25 1132             Imaging:    No Radiology Exams Resulted Within Past 24 Hours      Differential Diagnosis and Discussion:    Abscess: Differential diagnosis for an abscess includes but is not limited to bacterial or fungal infections, foreign body reactions, malignancies, and autoimmune or inflammatory conditions.  Wound Evaluation: Differential diagnosis includes but is not limited to laceration, abrasion, puncture, burn, ulcer, cellulitis, abscess, vasculitis, malignancy, and rash.    PROCEDURES:    Labs were collected in the emergency department and all labs were reviewed and interpreted by me.    No orders to display       Procedures    MDM  Number of Diagnoses or Management Options  Complication of obstetrical surgical wound, postpartum complication: established and worsening  Diagnosis management comments: Patient removed her wound VAC 4 days prior to seeing her OB/GYN and evaluation.  Patient educated on wound care.  Patient to follow-up with OB/GYN Monday for reevaluation postsurgical  wound.  Wound cultures were obtained in the emergency department today.       Amount and/or Complexity of Data Reviewed  Clinical lab tests: reviewed and ordered    Risk of Complications, Morbidity, and/or Mortality  Presenting problems: moderate  Diagnostic procedures: moderate  Management options: moderate    Patient Progress  Patient progress: stable                       Patient Care Considerations:    CT ABDOMEN AND PELVIS: I considered ordering a CT scan of the  abdomen and pelvis however patient states that the problem started this a.m. and would follow-up with her OB/GYN, patient denies any fever.      Consultants/Shared Management Plan:    None    Social Determinants of Health:    Patient is independent, reliable, and has access to care.       Disposition and Care Coordination:    Discharged: The patient is suitable and stable for discharge with no need for consideration of admission.    I have explained the patient´s condition, diagnoses and treatment plan based on the information available to me at this time. I have answered questions and addressed any concerns. The patient has a good  understanding of the patient´s diagnosis, condition, and treatment plan as can be expected at this point. The vital signs have been stable. The patient´s condition is stable and appropriate for discharge from the emergency department.      The patient will pursue further outpatient evaluation with the primary care physician or other designated or consulting physician as outlined in the discharge instructions. They are agreeable to this plan of care and follow-up instructions have been explained in detail. The patient has received these instructions in written format and has expressed an understanding of the discharge instructions. The patient is aware that any significant change in condition or worsening of symptoms should prompt an immediate return to this or the closest emergency department or call to 911.    Final diagnoses:   Complication of obstetrical surgical wound, postpartum complication        ED Disposition       ED Disposition   Discharge    Condition   Stable    Comment   --               This medical record created using voice recognition software.             Perlita Rogers, APRN  04/18/25 4204

## 2025-04-18 NOTE — TELEPHONE ENCOUNTER
Caller: Jessie Schultz  Relationship: self   Best call back number:   Who are you requesting to speak with (clinical staff, provider, specific staff member): M/A     What was the call regarding:    Patient found pus pocket near  scar yesterday, states it may have been there before but she just noticed it. This morning she states it burst open and she went through 4 double folded paper towels and 2 triple folded paper towels of excrement coming out of the pocket. Please advise, Dr. Dutton isn't in office today but she would like to know what our advice is regarding this development. Patient had  on 3/28.    Thank you.

## 2025-04-18 NOTE — TELEPHONE ENCOUNTER
Patient called back. States she got area cleaned up and showered and passed out. Spoke to Dr. Dutton and she advised patient to be seen in ER. Patient notified of recommendation.

## 2025-04-20 LAB
BACTERIA SPEC AEROBE CULT: ABNORMAL
GRAM STN SPEC: ABNORMAL

## 2025-04-22 NOTE — PROGRESS NOTES
"  POSTPARTUM Follow Up Visit      Chief Complaint   Patient presents with    Postpartum Care    Follow-up      incision check     HPI:    Patient went to the ED on  after passing out and a fall.  Patient states she noticed her incision opened and draining the night before this occurred.  She denies any fever or chills.  She states the pain has improved.  She has been taking clindamycin.  Date of delivery: 3/8/25  Delivery type:   LTCS          Perineum : Intact  Delivering Provider:   Dr. Roselia Dutton      Feeding: Bottle  Pain:  No  Vaginal Bleeding:  No  Depressed/Anxious:  No    Plans for BC:  Birth control pills  Last pap date and result:   Last Completed Pap Smear            Upcoming       PAP SMEAR (Every 3 Years) Next due on 2024  IGP,CtNgTv,Apt HPV,rfx 16 / ,45                                 Postpartum Depression: Low Risk  (2025)    [place]  Depression Scale     Last EPDS Total Score: 0     Last EPDS Self Harm Result: Never       PHYSICAL EXAM:  /78   Pulse 89   Ht 175.3 cm (69\")   Wt (!) 138 kg (304 lb)   LMP 2024   Breastfeeding No   BMI 44.89 kg/m²  28.6 kg (63 lb)  General- NAD, alert and oriented, appropriate  Psych- Normal mood, good memory, good eye contact    INCISION : 2 cm opening in the midline of incision site, half centimeter in depth.  Fascia intact.  Yellow drainage noted.  Iodoform inserted into the wound and covered with sterile dressing.  Abdomen- Soft, non distended, non tender, no masses, red irritation from surgical tape  Ext- No edema    ASSESSMENT AND PLAN:  Diagnoses and all orders for this visit:    1. Postpartum care following  delivery (Primary)    2. Postoperative wound infection      Counseling:    Monitor for worsening symptoms of infection  Continue clindamycin  Keep the wound clean and dry        Follow Up:  Return in about 1 week (around 2025) for wound check.          Roselia Dutton, " DO  04/23/2025    [facility] [place] [facility] [address]  [facility]  [address] [place] [address]  Dept: [phone]  Dept Fax: [phone]  Loc: [phone]

## 2025-04-23 ENCOUNTER — POSTPARTUM VISIT (OUTPATIENT)
Dept: OBSTETRICS AND GYNECOLOGY | Age: 30
End: 2025-04-23
Payer: COMMERCIAL

## 2025-04-23 ENCOUNTER — PATIENT OUTREACH (OUTPATIENT)
Dept: LABOR AND DELIVERY | Facility: HOSPITAL | Age: 30
End: 2025-04-23
Payer: COMMERCIAL

## 2025-04-23 VITALS
DIASTOLIC BLOOD PRESSURE: 78 MMHG | BODY MASS INDEX: 43.4 KG/M2 | SYSTOLIC BLOOD PRESSURE: 124 MMHG | WEIGHT: 293 LBS | HEIGHT: 69 IN | HEART RATE: 89 BPM

## 2025-04-23 DIAGNOSIS — T81.49XA POSTOPERATIVE WOUND INFECTION: ICD-10-CM

## 2025-04-23 NOTE — OUTREACH NOTE
Motherhood Connection  Postpartum Check-In    Questions/Answers      Flowsheet Row Responses   Visit Setting Telephone   Best Method for Contacting Cell   OB Discharge Note Reviewed  Reviewed   OB Discharge Navigator Reviewed  Reviewed   OB Discharge Medications Reviewed  Reviewed    discharged home with mother? Yes   Current Pain Levels 0-10 3   At Rest Pain Levels 0-10 3   Pain level with activity 0-10 3   Acceptable Pain Level 0-10 3   Verbalized Emotional State Acceptance   Family/Support Network Family, Significant Other   Level of Involvement in Care Attentive, Interactive, Supportive   Do you feel comfortable in your relationship with your baby? Yes   Have members of your household adjusted to your baby? Yes   Is the baby's father supportive and/or involved with the baby? No   Do you feel safe at home, school and work? Yes   Are you in a relationship with someone who threatens you or hurts you? No   Do you have the resources to keep yourself and your baby healthy and safe? Yes   Amount Scant   Number of pads per day 3   Lochia Odor None   Is patient breastfeeding? No   Postpartum Depression Screening Education Education Provided   Doctor Appointments: Education Provided   Family Planning Education Education Provided   Postpartum Care Education Education Provided   S & S to report Education Provided   Followup Appointments Made Yes   Well Child Visit Appointments Made Yes   Did you complete the visit? Yes   Were there any specific concerns? No   Umbilical Cord No reported signs or symptoms   Infant Feeding Method Formula   Formula Type Other   Other Formula similac sensitive   Formula PO (mL) 1-2oz   Formula/Expressed Milk frequency of feedings: q2-3h   Number of wet diapers x 24 hours 10   Last BM x 24 hours 1   What safe sleep surface is available? Bassinet   Are there stuffed animals, toys, pillows, quilts, blankets, wedges, positioners, bumpers or other loose bedding in the infant's sleeping  environment? No   Where does the baby usually sleep? Bassinet   Does the baby ever share a sleep surface with a sibling, adult or pet? No   Does the baby ever share a sleep surface in a bed, couch, recliner or other? No   What position do you place your baby to sleep for naps? Back   What position do you place your baby to sleep at night Back   Are you and/or other caregivers smoking inside or outside the baby's home? No   Is the infant dressed appropiately for the temperature of the home? Yes   Do you use a clean, dry pacifier that is not attached to a string or stuffed animal? Yes            Review of Systems   Skin:  Positive for wound.   All other systems reviewed and are negative.  Currently at OB office to be seen. Abdominal wound opened up and is draining.     Most Recent [place]  Depression Scale Score (EPDS)    Performed by a clinician: 0 (2025  2:56 PM)    Discussed normal stool patterns in infants. No current questions, needs or concerns.     5 Ps Screen  complete      Ai Trevizo RN  Maternity Nurse Navigator    2025, 15:08 EDT

## 2025-04-25 NOTE — PROGRESS NOTES
POSTPARTUM Follow Up Visit      Chief Complaint   Patient presents with    Postpartum Care     Incision check     HPI:    Date of delivery: 3/8/25  Delivery type:   LTCS          Perineum : Intact  Delivering Provider:   Dr. Roselia Dutton      Feeding: Bottle  Pain:  No  Vaginal Bleeding:  No  Depressed/Anxious:  No     Plans for BC:  Birth control pills  Last pap date and result:   Last Completed Pap Smear            Upcoming       PAP SMEAR (Every 3 Years) Next due on 2024  IGP,CtNgTv,Apt HPV,rfx 16 / 18,45                                 Postpartum Depression: Low Risk  (2025)    [place]  Depression Scale     Last EPDS Total Score: 0     Last EPDS Self Harm Result: Never       PHYSICAL EXAM:  /75   Pulse 83   Wt (!) 137 kg (301 lb)   LMP 2024   Breastfeeding No   BMI 44.45 kg/m²  28.6 kg (63 lb)  General- NAD, alert and oriented, appropriate  Psych- Normal mood, good memory, good eye contact    INCISION : Clean and dry, 1 cm opening that is smaller than last week, appears to only be superficial at this time with no reason for packing.  Abdomen- Soft, non distended, non tender, erythematous from surgical tape  Ext- No edema    ASSESSMENT AND PLAN:  Diagnoses and all orders for this visit:    1. Postpartum care following  delivery (Primary)    2. Postoperative wound infection      Counseling:    Continue to clean the wound and cover with clean dressing  Call with any worsening symptoms        Follow Up:  Return in about 2 weeks (around 2025) for Postpartum.          Roselia Dutton DO  2025    [facility] [place] [facility] [address] [facility]  [address] [place] [address] [address] [place] [address]  Dept: [phone]  Dept Fax: [phone]  Loc: [phone]

## 2025-04-29 ENCOUNTER — POSTPARTUM VISIT (OUTPATIENT)
Dept: OBSTETRICS AND GYNECOLOGY | Age: 30
End: 2025-04-29
Payer: COMMERCIAL

## 2025-04-29 VITALS
BODY MASS INDEX: 44.45 KG/M2 | DIASTOLIC BLOOD PRESSURE: 75 MMHG | SYSTOLIC BLOOD PRESSURE: 117 MMHG | WEIGHT: 293 LBS | HEART RATE: 83 BPM

## 2025-04-29 DIAGNOSIS — T81.49XA POSTOPERATIVE WOUND INFECTION: ICD-10-CM

## 2025-04-30 ENCOUNTER — PATIENT OUTREACH (OUTPATIENT)
Dept: CALL CENTER | Facility: HOSPITAL | Age: 30
End: 2025-04-30
Payer: COMMERCIAL

## 2025-04-30 NOTE — OUTREACH NOTE
Motherhood Connection Survey      Flowsheet Row Responses   [facility] patient discharged from? [facility]   Week 1 attempt successful? Yes   Call start time 1520   Call end time 1525   Baby sex Girl    discharged home with mother? Yes   Baby sex Girl   Delivery type    Emotional state Acceptance   Family support Yes   Do you have all necessary resources to care for you and your baby?  Yes   Have members of your household adjusted to your baby? Yes   Did you have any problems with pre-eclampsia during this pregnancy? No   Do you have any of the following: No Issues   Did you have blood glucose issues during this pregnancy No   Lochia amount None   Did you have an episiotomy/tear/abdominal incision? Yes   Additional comments Had staph infection in  incision and is on antibiotics.  Healing well now without any redness/drainage/foul odor.   Feeding Method Bottle   Frequency every 2 hours   Amount 2 oz   Storage of bottles mixes as needed   Breast Condition No   Nipple Condition No   Nursing Interventions Supportive bra   Signs baby is ready to eat Crying, Finger sucking   Feeding tolerance Weight gain, Wet/dirty diapers   Number of wet diapers x 24 hours 6   Last BM x 24 hours 1   Umbilical Cord No reported signs or symptoms   Where does the baby usually sleep? Bassinet   Are there stuffed animals, toys, pillows, quilts, blankets, wedges, positioners, bumpers or other loose bedding in the infant's sleeping environment? No   Does the baby ever share a sleep surface in a bed, couch, recliner or other? No   What position do you lay your baby down to sleep? Back   Are you and/or other caregivers smoking inside or outside the baby's home? No   Mom appointment comments: has had several appts with OB   Baby appointment comments: has had 2 apps with peds.  Has 2 month appt scheduled   Call completed? Yes   How satisfied were you with the Motherhood Connection Program? 5   Anyone you would like to  recognize from your time in the Motherhood Connection Program Ai DALTON - Registered Nurse

## 2025-05-08 ENCOUNTER — TELEPHONE (OUTPATIENT)
Dept: OBSTETRICS AND GYNECOLOGY | Age: 30
End: 2025-05-08

## 2025-05-08 NOTE — TELEPHONE ENCOUNTER
Caller: MarionJessie    Relationship: Self    Best call back number: [phone]     What is the best time to reach you: CALL ANYTIME    Who are you requesting to speak with (clinical staff, provider,  specific staff member): FIRST AVAILABLE    POSTPARTUM PATIENT IS REQUESTING TO SPEAK WITH CLINICAL TO DISCUSS STABBING PAIN ON TOP OF RIGHT THIGH. PAIN STARTED AROUND MONDAY. PATIENT STATES VAGINAL AREA IN NUMB. STARTED BLEEDING TUESDAY OR WEDNESDAY. CHANGING TAMPON EVERY COUPLE HOURS. PATIENT STATES UNABLE TO FEEL MENSTRUAL FLOW DUE TO NUMBNESS.

## 2025-05-15 ENCOUNTER — TELEPHONE (OUTPATIENT)
Dept: OBSTETRICS AND GYNECOLOGY | Age: 30
End: 2025-05-15
Payer: COMMERCIAL

## 2025-05-15 DIAGNOSIS — M79.651 PAIN OF RIGHT THIGH: Primary | ICD-10-CM

## 2025-05-15 NOTE — TELEPHONE ENCOUNTER
Patient called wanting to know if she can get an order for a CT scan. She has noted vaginal numbness since she delivered (3/28/25) and a constant stabbing pain in her upper right thigh since she went home (3/30/25). She states with the numbness she can feel the need to urinate but can not actually feel herself go. She thought it would get better and go away but it has not. She just wants to get the CT order because she does not have childcare and does not want to take the baby with her in the ER. Please advise.

## 2025-05-22 ENCOUNTER — POSTPARTUM VISIT (OUTPATIENT)
Dept: OBSTETRICS AND GYNECOLOGY | Age: 30
End: 2025-05-22
Payer: COMMERCIAL

## 2025-05-22 ENCOUNTER — TELEPHONE (OUTPATIENT)
Dept: OBSTETRICS AND GYNECOLOGY | Age: 30
End: 2025-05-22

## 2025-05-22 VITALS
HEART RATE: 73 BPM | BODY MASS INDEX: 43.4 KG/M2 | SYSTOLIC BLOOD PRESSURE: 138 MMHG | HEIGHT: 69 IN | DIASTOLIC BLOOD PRESSURE: 88 MMHG | WEIGHT: 293 LBS

## 2025-05-22 DIAGNOSIS — Z30.011 VISIT FOR ORAL CONTRACEPTIVE PRESCRIPTION: ICD-10-CM

## 2025-05-22 DIAGNOSIS — E28.2 PCOS (POLYCYSTIC OVARIAN SYNDROME): ICD-10-CM

## 2025-05-22 RX ORDER — SPIRONOLACTONE 50 MG/1
50 TABLET, FILM COATED ORAL 2 TIMES DAILY
Qty: 60 TABLET | Refills: 5 | Status: SHIPPED | OUTPATIENT
Start: 2025-05-22

## 2025-05-22 RX ORDER — NORGESTIMATE AND ETHINYL ESTRADIOL 0.25-0.035
1 KIT ORAL DAILY
Qty: 28 TABLET | Refills: 12 | Status: SHIPPED | OUTPATIENT
Start: 2025-05-22

## 2025-05-22 NOTE — PROGRESS NOTES
"  POSTPARTUM Follow Up Visit      Chief Complaint   Patient presents with    Abdominal Pain     Pt has been having pain that she can't explain     HPI:      Date of delivery: 3/8/25  Delivery type:   LTCS          Perineum : Intact  Delivering Provider:   Dr. Roselia Dutton      Feeding: Bottle  Pain:  No  Vaginal Bleeding:  No  Depressed/Anxious:  No     Plans for BC:  Birth control pills  EPDS score: 1   #10: 0    Last pap date and result:   Last Completed Pap Smear            Upcoming       PAP SMEAR (Every 3 Years) Next due on 2024  IGP,CtNgTv,Apt HPV,rfx 16 / 18,45                                 Postpartum Depression: Low Risk  (2025)    [place]  Depression Scale     Last EPDS Total Score: 1     Last EPDS Self Harm Result: Never       PHYSICAL EXAM:  /88   Pulse 73   Ht 175.3 cm (69\")   Wt (!) 140 kg (308 lb 9.6 oz)   LMP 2024   BMI 45.57 kg/m²  28.6 kg (63 lb)  General- NAD, alert and oriented, appropriate  Psych- Normal mood, good memory, good eye contact    INCISION : Healing well, small 5 mm opening in the midline, superficial, minimal drainage noted  Abdomen- Soft, non distended, non tender, no masses    Ext- No edema    ASSESSMENT AND PLAN:  Diagnoses and all orders for this visit:    1. Postpartum care following  delivery (Primary)    2. Visit for oral contraceptive prescription  -     norgestimate-ethinyl estradiol (ORTHO-CYCLEN) 0.25-35 MG-MCG per tablet; Take 1 tablet by mouth Daily.  Dispense: 28 tablet; Refill: 12    3. PCOS (polycystic ovarian syndrome)  -     spironolactone (ALDACTONE) 50 MG tablet; Take 1 tablet by mouth 2 (Two) Times a Day.  Dispense: 60 tablet; Refill: 5      Counseling:    All birth control options reviewed in detail.  R/B/A/SE/E of each wrt pts PMHx and prior BC use  Ok to resume intercourse  May resume normal activities  Core strengthening exercises reviewed and recommended  Kegel exercises reviewed and " recommended  Ok to return to work/school once patient desires/maternity leave completed  Use backup contraception for 4 weeks after initiating chosen BC        Follow Up:  Return in 1 year (on 5/22/2026), or if symptoms worsen or fail to improve, for Annual physical.          Roselia Dutton DO  05/22/2025    [id number]  [facility]  [facility] [place] [address]  Dept: [phone]  Dept Fax: [phone]  Loc: [phone]

## 2025-05-22 NOTE — TELEPHONE ENCOUNTER
Caller: Jessie Schultz    Relationship: Self    Best call back number: [phone]      What form or medical record are you requesting: WORK NOTE     Who is requesting this form or medical record from you: EMPLOYER    How would you like to receive the form or medical records (pick-up, mail, fax): [facility]       Timeframe paperwork needed: ASAP    Additional notes: PATIENT IS NEEDING RETURN TO WORK NOTE WITH NO RESTRICTIONS FOR 05/27/25. PATIENT WAS SEEN IN OFFICE TODAY AND FORGOT TO ASK FOR NOTE.

## 2025-05-30 ENCOUNTER — OFFICE VISIT (OUTPATIENT)
Dept: FAMILY MEDICINE CLINIC | Facility: CLINIC | Age: 30
End: 2025-05-30
Payer: COMMERCIAL

## 2025-05-30 VITALS
HEART RATE: 84 BPM | HEIGHT: 69 IN | WEIGHT: 293 LBS | DIASTOLIC BLOOD PRESSURE: 81 MMHG | TEMPERATURE: 98 F | OXYGEN SATURATION: 98 % | BODY MASS INDEX: 43.4 KG/M2 | SYSTOLIC BLOOD PRESSURE: 137 MMHG

## 2025-05-30 DIAGNOSIS — R19.7 DIARRHEA, UNSPECIFIED TYPE: Primary | ICD-10-CM

## 2025-05-30 DIAGNOSIS — R19.4 CHANGE IN BOWEL HABITS: ICD-10-CM

## 2025-05-30 PROCEDURE — 1126F AMNT PAIN NOTED NONE PRSNT: CPT

## 2025-05-30 PROCEDURE — 99214 OFFICE O/P EST MOD 30 MIN: CPT

## 2025-05-30 NOTE — PROGRESS NOTES
Chief Complaint     Diarrhea (Since May 8th, liquid every bowel movement )    Patient or patient representative verbalized consent for the use of Ambient Listening during the visit with  MARIA E Medina for chart documentation. 2025  09:03 EDT    History of Present Illness     Jessie Schultz is a 29 y.o. female who presents to [facility] .    History of Present Illness  The patient presents for evaluation of diarrhea.    She has been experiencing persistent diarrhea since 2025, with no relief despite dietary modifications such as consuming bagels for breakfast. The stool is described as yellow in color. She is not currently breastfeeding. She has not yet attempted to manage the symptoms with Imodium. Her medical history includes a staphylococcal infection at her  site, which lasted for approximately 2 weeks. Prior to the birth of her child, she experienced a similar episode of diarrhea that lasted for a week, necessitating a 3-day hospital stay. During this period, she underwent multiple blood tests and a stool sample analysis, all of which returned negative results. She reports no significant changes in her diet or fluid intake.         History      Past Medical History:   Diagnosis Date    Allergies     Ankle fracture, left 2024    Carpal tunnel syndrome     LEFT    Hyperlipidemia     Prediabetes        Past Surgical History:   Procedure Laterality Date    ADENOIDECTOMY      CARPAL TUNNEL RELEASE Right 2016    CARPAL TUNNEL RELEASE Left 2023    Procedure: CARPAL TUNNEL RELEASE;  Surgeon: Bill Jones MD;  Location: [facility];  Service: Orthopedics;  Laterality: Left;     SECTION N/A 3/28/2025    Procedure:  SECTION PRIMARY;  Surgeon: Roselia Dutton DO;  Location: [facility] LABOR DELIVERY;  Service: Gynecology;  Laterality: N/A;    TONSILLECTOMY      TYMPANOSTOMY TUBE PLACEMENT      x2       Family History   Problem  Relation Age of Onset    Hypertension Father     Diabetes Father     Hypertension Mother     Diabetes Mother     Hypertension Brother     Diabetes Paternal Grandfather     Diabetes Maternal Grandmother     Deep vein thrombosis Other     Colon cancer Other     Malig Hyperthermia Neg Hx     Miscarriages / Stillbirths Neg Hx     Mental retardation Neg Hx     Mental illness Neg Hx     Learning disabilities Neg Hx     Kidney disease Neg Hx     Hyperlipidemia Neg Hx     Heart disease Neg Hx     Hearing loss Neg Hx     Early death Neg Hx     Drug abuse Neg Hx     Depression Neg Hx     COPD Neg Hx     Cancer Neg Hx     Bleeding Disorder Neg Hx     Birth defects Neg Hx     Asthma Neg Hx     Arthritis Neg Hx     Alcohol abuse Neg Hx     Breast cancer Neg Hx     Ovarian cancer Neg Hx     Uterine cancer Neg Hx     Melanoma Neg Hx     Prostate cancer Neg Hx         Current Medications        Current Outpatient Medications:     cetirizine (zyrTEC) 10 MG tablet, Take 1 tablet by mouth Daily., Disp: 90 tablet, Rfl: 4    norgestimate-ethinyl estradiol (ORTHO-CYCLEN) 0.25-35 MG-MCG per tablet, Take 1 tablet by mouth Daily., Disp: 28 tablet, Rfl: 12    sodium chloride 0.65 % nasal spray, 1 spray into the nostril(s) as directed by provider As Needed for Congestion., Disp: 44 mL, Rfl: 12    spironolactone (ALDACTONE) 50 MG tablet, Take 1 tablet by mouth 2 (Two) Times a Day., Disp: 60 tablet, Rfl: 5    Vancomycin HCl 250 MG/5ML reconstituted solution oral solution reconstituted, Take 2.5 mL by mouth Every 6 (Six) Hours for 10 days, THEN 2.5 mL Every 12 (Twelve) Hours for 7 days, THEN 2.5 mL Daily for 7 days, THEN 2.5 mL Every Other Day for 14 days., Disp: 170 mL, Rfl: 0     Allergies     Allergies   Allergen Reactions    Sulfa Antibiotics Hives    Sulfacetamide Sodium Hives    Sulfamethoxazole Hives    Sulfamethoxazole-Trimethoprim Hives    Sulfasalazine Hives    Sulfonylureas Hives       Social History       Social History     Social  "History Narrative    Not on file       Immunizations     Immunization:  Immunization History   Administered Date(s) Administered    Covid-19 (Pfizer) Gray Cap Monovalent 02/12/2022    Flu Vaccine Intradermal Quad 18-64YR 02/12/2022    Fluzone  >6mos 12/11/2024    HPV Quadrivalent 08/17/2007    Influenza Seasonal Injectable 02/12/2022    Tdap 08/17/2007, 11/29/2014          Objective     Objective     Vital Signs:   /81 (BP Location: Left arm, Patient Position: Sitting, Cuff Size: Adult)   Pulse 84   Temp 98 °F (36.7 °C) (Temporal)   Ht 175.3 cm (69\")   Wt (!) 142 kg (312 lb)   SpO2 98%   BMI 46.07 kg/m²       Physical Exam    Physical Exam        Results    The following data was reviewed by: MARIA E Medina on 05/30/2025:    Common labs          3/28/2025    10:38 3/29/2025    05:19 4/18/2025    11:27   Common Labs   Glucose   95    BUN   13    Creatinine   0.46    Sodium   141    Potassium   4.8    Chloride   105    Calcium   9.5    Albumin   3.9    Total Bilirubin   <0.2    Alkaline Phosphatase   105    AST (SGOT)   11    ALT (SGPT)   9    WBC 12.37  10.59  11.32    Hemoglobin 12.6  9.2  11.6    Hematocrit 36.4  27.6  34.9    Platelets 285  194  371          Results         Assessment and Plan        Assessment and Plan    Diagnoses and all orders for this visit:    1. Diarrhea, unspecified type (Primary)  -     Enteric Parasite Panel - Stool, Per Rectum; Future  -     Enteric Bacterial Panel - Stool, Per Rectum; Future  -     Clostridioides difficile Toxin, PCR - Stool, Per Rectum; Future  -     Ambulatory Referral to Gastroenterology    2. Change in bowel habits  -     Ambulatory Referral to Gastroenterology        Assessment & Plan  1. Diarrhea.  - Persistent diarrhea since 05/08/2025, with yellow-colored stools.  - Comprehensive workup for diarrhea will be conducted, including an enteric bacteria panel, enteric parasite panel, and C. difficile toxin test, all requiring stool samples.  - " Referral to a gastroenterologist will be made due to the sudden change in bowel habits.  - Advised to purchase over-the-counter Imodium but to wait for the test results before using it to ensure there is no underlying infection.        Follow Up        Follow Up   Return if symptoms worsen or fail to improve.  Patient was given instructions and counseling regarding her condition or for health maintenance advice. Please see specific information pulled into the AVS if appropriate.

## 2025-06-02 ENCOUNTER — LAB (OUTPATIENT)
Dept: LAB | Facility: HOSPITAL | Age: 30
End: 2025-06-02
Payer: COMMERCIAL

## 2025-06-02 DIAGNOSIS — R19.7 DIARRHEA, UNSPECIFIED TYPE: ICD-10-CM

## 2025-06-02 DIAGNOSIS — A04.72 CLOSTRIDIUM DIFFICILE COLITIS: Primary | ICD-10-CM

## 2025-06-02 LAB
027 TOXIN: ABNORMAL
C DIFF GDH + TOXINS A+B STL QL IA.RAPID: NEGATIVE
C DIFF TOX GENS STL QL NAA+PROBE: POSITIVE

## 2025-06-02 PROCEDURE — 87506 IADNA-DNA/RNA PROBE TQ 6-11: CPT

## 2025-06-02 PROCEDURE — 87449 NOS EACH ORGANISM AG IA: CPT

## 2025-06-02 PROCEDURE — 87493 C DIFF AMPLIFIED PROBE: CPT

## 2025-06-02 RX ORDER — VANCOMYCIN HYDROCHLORIDE 250 MG/5ML
POWDER, FOR SOLUTION ORAL
Qty: 170 ML | Refills: 0 | Status: SHIPPED | OUTPATIENT
Start: 2025-06-02 | End: 2025-06-03 | Stop reason: SDUPTHER

## 2025-06-03 ENCOUNTER — TELEPHONE (OUTPATIENT)
Dept: FAMILY MEDICINE CLINIC | Facility: CLINIC | Age: 30
End: 2025-06-03
Payer: COMMERCIAL

## 2025-06-03 DIAGNOSIS — A04.72 CLOSTRIDIUM DIFFICILE COLITIS: ICD-10-CM

## 2025-06-03 LAB
C COLI+JEJ+UPSA DNA STL QL NAA+NON-PROBE: NOT DETECTED
CRYPTOSP DNA STL QL NAA+NON-PROBE: NOT DETECTED
E HISTOLYT DNA STL QL NAA+NON-PROBE: NOT DETECTED
EC STX1+STX2 GENES STL QL NAA+NON-PROBE: NOT DETECTED
G LAMBLIA DNA STL QL NAA+NON-PROBE: NOT DETECTED
S ENT+BONG DNA STL QL NAA+NON-PROBE: NOT DETECTED
SHIGELLA SP+EIEC IPAH ST NAA+NON-PROBE: NOT DETECTED

## 2025-06-03 RX ORDER — VANCOMYCIN HYDROCHLORIDE 250 MG/5ML
POWDER, FOR SOLUTION ORAL
Qty: 170 ML | Refills: 0 | Status: SHIPPED | OUTPATIENT
Start: 2025-06-03 | End: 2025-07-11

## 2025-06-03 NOTE — TELEPHONE ENCOUNTER
Caller: [facility] - [place], [place] - [address] [place], [place] [address] - [phone]  - [phone] FX    Relationship: Pharmacy    What medication are you requesting: VANCOMYCIN - CAPSULES    What are your current symptoms: PHARMACY IS REQUESTING CAPSULES INSTEAD OF SOLUTION BECAUSE IT HAS A LONGER SHELF LIFE IF MARLI CHEWNING IS AGREEABLE.      If a prescription is needed, what is your preferred pharmacy and phone number: [facility] - [place], [place] - [address] [place], [place] [address] - [phone]  - [phone] FX

## 2025-06-03 NOTE — TELEPHONE ENCOUNTER
Caller: Jessie Schultz    Relationship: Self    Best call back number  [phone]    Which medication are you concerned about: VANCOMYCIN HCI    Who prescribed you this medication: MARLI BROWN     What are your concerns: THE PHARMACY SHE HAD IT SENT TO DOES NOT HAVE THE MEDICATION, PLEASE SEND IT TO [facility] INSTEAD     .”

## 2025-06-04 ENCOUNTER — DOCUMENTATION (OUTPATIENT)
Dept: FAMILY MEDICINE CLINIC | Facility: CLINIC | Age: 30
End: 2025-06-04
Payer: COMMERCIAL

## 2025-06-04 NOTE — TELEPHONE ENCOUNTER
Liquid is preferred for c diff. Please let the pharmacy know that and see if her insurance covers it

## 2025-07-30 DIAGNOSIS — A04.72 C. DIFFICILE COLITIS: Primary | ICD-10-CM

## 2025-08-01 ENCOUNTER — TELEPHONE (OUTPATIENT)
Dept: GASTROENTEROLOGY | Facility: CLINIC | Age: 30
End: 2025-08-01

## 2025-08-01 ENCOUNTER — LAB (OUTPATIENT)
Dept: LAB | Facility: HOSPITAL | Age: 30
End: 2025-08-01
Payer: COMMERCIAL

## 2025-08-01 DIAGNOSIS — A04.72 C. DIFFICILE COLITIS: ICD-10-CM

## 2025-08-01 PROCEDURE — 87449 NOS EACH ORGANISM AG IA: CPT

## 2025-08-01 PROCEDURE — 87493 C DIFF AMPLIFIED PROBE: CPT

## 2025-08-01 NOTE — TELEPHONE ENCOUNTER
Spoke with patient to see if she would like a sooner appointment with Tamika Lo. She has rescheduled her appointment from Oct to 8/1/25@36:00.

## 2025-08-06 DIAGNOSIS — A04.71 RECURRENT CLOSTRIDIOIDES DIFFICILE DIARRHEA: Primary | ICD-10-CM

## 2025-08-08 ENCOUNTER — TELEPHONE (OUTPATIENT)
Dept: GASTROENTEROLOGY | Facility: CLINIC | Age: 30
End: 2025-08-08

## 2025-08-08 ENCOUNTER — OFFICE VISIT (OUTPATIENT)
Dept: GASTROENTEROLOGY | Facility: CLINIC | Age: 30
End: 2025-08-08
Payer: COMMERCIAL

## 2025-08-08 ENCOUNTER — SPECIALTY PHARMACY (OUTPATIENT)
Dept: GASTROENTEROLOGY | Facility: CLINIC | Age: 30
End: 2025-08-08
Payer: COMMERCIAL

## 2025-08-08 VITALS
SYSTOLIC BLOOD PRESSURE: 139 MMHG | WEIGHT: 293 LBS | BODY MASS INDEX: 43.4 KG/M2 | DIASTOLIC BLOOD PRESSURE: 75 MMHG | OXYGEN SATURATION: 100 % | HEART RATE: 94 BPM | HEIGHT: 69 IN

## 2025-08-08 DIAGNOSIS — A04.71 RECURRENT CLOSTRIDIOIDES DIFFICILE DIARRHEA: Primary | ICD-10-CM

## 2025-08-11 ENCOUNTER — PATIENT ROUNDING (BHMG ONLY) (OUTPATIENT)
Dept: GASTROENTEROLOGY | Facility: CLINIC | Age: 30
End: 2025-08-11
Payer: COMMERCIAL

## 2025-08-11 RX ORDER — FECAL MICROBIOTA SPORES, LIVE-BRPK 30000000 [CFU]/1
4 CAPSULE ORAL
Qty: 12 CAPSULE | Refills: 0 | Status: SHIPPED | OUTPATIENT
Start: 2025-08-11

## 2025-08-19 ENCOUNTER — TELEPHONE (OUTPATIENT)
Dept: OTHER | Facility: HOSPITAL | Age: 30
End: 2025-08-19
Payer: COMMERCIAL

## (undated) DEVICE — SYS PUMP PREVENA125 INCSN MNGT PP/DRSNG 45ML 1P/U

## (undated) DEVICE — GAUZE,SPONGE,4"X4",16PLY,STRL,LF,10/TRAY: Brand: MEDLINE

## (undated) DEVICE — THE STERILE LIGHT HANDLE COVER IS USED WITH STERIS SURGICAL LIGHTING AND VISUALIZATION SYSTEMS.

## (undated) DEVICE — INTENDED FOR TISSUE SEPARATION, AND OTHER PROCEDURES THAT REQUIRE A SHARP SURGICAL BLADE TO PUNCTURE OR CUT.: Brand: BARD-PARKER ® CARBON RIB-BACK BLADES

## (undated) DEVICE — NDL HYPO ECLPS SFTY 22G 1IN

## (undated) DEVICE — UNDERCAST PADDING: Brand: DEROYAL

## (undated) DEVICE — GLV SURG BIOGEL LTX PF 8 1/2

## (undated) DEVICE — EXTRA LARGE, DISPOSABLE C-SECTION RETRACTOR: Brand: ALEXIS ® O C-SECTION PROTECTOR-RETRACTOR

## (undated) DEVICE — SUT VIC 4/0 KS 27IN VCP662H

## (undated) DEVICE — GLV SURG SENSICARE SLT PF LF 8.5 STRL

## (undated) DEVICE — VAGINAL PREP TRAY: Brand: MEDLINE INDUSTRIES, INC.

## (undated) DEVICE — PENCL SMOKE/EVAC MEGADYNE TELESCP 10FT

## (undated) DEVICE — DEV TRANSF BLD W/LUER ADPT CA/198

## (undated) DEVICE — Device: Brand: PORTEX

## (undated) DEVICE — SUT VIC 0 CTX 36IN J978H

## (undated) DEVICE — GLV SURG BIOGEL LTX PF 6 1/2

## (undated) DEVICE — DISPOSABLE TOURNIQUET CUFF SINGLE BLADDER, SINGLE PORT AND QUICK CONNECT CONNECTOR: Brand: COLOR CUFF

## (undated) DEVICE — GLV SURG SENSICARE PI PF LF 7 GRN STRL

## (undated) DEVICE — BNDG ESMARK 4IN 12FT LF STRL BLU

## (undated) DEVICE — PAD GRND REM POLYHESIVE A/ DISP

## (undated) DEVICE — GLV SURG SENSICARE SLT PF LF 7 STRL

## (undated) DEVICE — PREVENA INCISION MANAGEMENT SYSTEM- PEEL & PLACE DRESSING: Brand: PREVENA™ PEEL & PLACE™

## (undated) DEVICE — C SECTION PACK: Brand: MEDLINE INDUSTRIES, INC.

## (undated) DEVICE — SUT PLAIN  3/0 CT1 27IN 842H

## (undated) DEVICE — TRY CATH FOL ADVANCE SIL W/BAG 16F

## (undated) DEVICE — NEEDLE,18GX1.5",REG,BEVEL: Brand: MEDLINE

## (undated) DEVICE — CVR HNDL LT SURG ACCSSRY BLU STRL

## (undated) DEVICE — STERILE POLYISOPRENE POWDER-FREE SURGICAL GLOVES WITH EMOLLIENT COATING: Brand: PROTEXIS

## (undated) DEVICE — DRSNG WND GZ CURAD OIL EMULSION 3X3IN STRL

## (undated) DEVICE — BNDG ELAS ECON W/CLIP 4IN 5YD LF STRL

## (undated) DEVICE — EXTREMITY-LF: Brand: MEDLINE INDUSTRIES, INC.

## (undated) DEVICE — SUT ETHLN 4/0 FS2 18IN 662H